# Patient Record
Sex: MALE | Race: WHITE | Employment: FULL TIME | URBAN - METROPOLITAN AREA
[De-identification: names, ages, dates, MRNs, and addresses within clinical notes are randomized per-mention and may not be internally consistent; named-entity substitution may affect disease eponyms.]

---

## 2017-03-27 DIAGNOSIS — R93.1 ELEVATED CORONARY ARTERY CALCIUM SCORE: ICD-10-CM

## 2017-03-27 DIAGNOSIS — E78.2 MIXED HYPERLIPIDEMIA: ICD-10-CM

## 2017-03-27 RX ORDER — ATORVASTATIN CALCIUM 20 MG/1
TABLET, FILM COATED ORAL
Qty: 90 TAB | Refills: 3 | Status: SHIPPED | COMMUNITY
Start: 2017-03-27 | End: 2017-11-06 | Stop reason: SDUPTHER

## 2017-10-23 ENCOUNTER — TELEPHONE (OUTPATIENT)
Dept: INTERNAL MEDICINE CLINIC | Age: 67
End: 2017-10-23

## 2017-10-23 DIAGNOSIS — E78.2 MIXED HYPERLIPIDEMIA: Primary | ICD-10-CM

## 2017-10-23 DIAGNOSIS — Z79.899 ENCOUNTER FOR LONG-TERM CURRENT USE OF MEDICATION: ICD-10-CM

## 2017-10-27 LAB
ALBUMIN SERPL-MCNC: 4.4 G/DL (ref 3.6–4.8)
ALBUMIN/GLOB SERPL: 1.8 {RATIO} (ref 1.2–2.2)
ALP SERPL-CCNC: 57 IU/L (ref 39–117)
ALT SERPL-CCNC: 21 IU/L (ref 0–44)
APPEARANCE UR: CLEAR
AST SERPL-CCNC: 24 IU/L (ref 0–40)
BASOPHILS # BLD AUTO: 0 X10E3/UL (ref 0–0.2)
BASOPHILS NFR BLD AUTO: 0 %
BILIRUB SERPL-MCNC: 0.6 MG/DL (ref 0–1.2)
BILIRUB UR QL STRIP: NEGATIVE
BUN SERPL-MCNC: 22 MG/DL (ref 8–27)
BUN/CREAT SERPL: 21 (ref 10–24)
CALCIUM SERPL-MCNC: 9.2 MG/DL (ref 8.6–10.2)
CHLORIDE SERPL-SCNC: 100 MMOL/L (ref 96–106)
CHOLEST SERPL-MCNC: 170 MG/DL (ref 100–199)
CO2 SERPL-SCNC: 28 MMOL/L (ref 18–29)
COLOR UR: YELLOW
CREAT SERPL-MCNC: 1.07 MG/DL (ref 0.76–1.27)
EOSINOPHIL # BLD AUTO: 0.1 X10E3/UL (ref 0–0.4)
EOSINOPHIL NFR BLD AUTO: 3 %
ERYTHROCYTE [DISTWIDTH] IN BLOOD BY AUTOMATED COUNT: 13.4 % (ref 12.3–15.4)
GFR SERPLBLD CREATININE-BSD FMLA CKD-EPI: 71 ML/MIN/1.73
GFR SERPLBLD CREATININE-BSD FMLA CKD-EPI: 83 ML/MIN/1.73
GLOBULIN SER CALC-MCNC: 2.4 G/DL (ref 1.5–4.5)
GLUCOSE SERPL-MCNC: 91 MG/DL (ref 65–99)
GLUCOSE UR QL: NEGATIVE
HCT VFR BLD AUTO: 44.3 % (ref 37.5–51)
HDLC SERPL-MCNC: 67 MG/DL
HGB BLD-MCNC: 14.2 G/DL (ref 12.6–17.7)
HGB UR QL STRIP: NEGATIVE
IMM GRANULOCYTES # BLD: 0 X10E3/UL (ref 0–0.1)
IMM GRANULOCYTES NFR BLD: 1 %
KETONES UR QL STRIP: NEGATIVE
LDLC SERPL CALC-MCNC: 90 MG/DL (ref 0–99)
LEUKOCYTE ESTERASE UR QL STRIP: NEGATIVE
LYMPHOCYTES # BLD AUTO: 1.6 X10E3/UL (ref 0.7–3.1)
LYMPHOCYTES NFR BLD AUTO: 32 %
MCH RBC QN AUTO: 30.1 PG (ref 26.6–33)
MCHC RBC AUTO-ENTMCNC: 32.1 G/DL (ref 31.5–35.7)
MCV RBC AUTO: 94 FL (ref 79–97)
MICRO URNS: NORMAL
MONOCYTES # BLD AUTO: 0.5 X10E3/UL (ref 0.1–0.9)
MONOCYTES NFR BLD AUTO: 9 %
NEUTROPHILS # BLD AUTO: 2.7 X10E3/UL (ref 1.4–7)
NEUTROPHILS NFR BLD AUTO: 55 %
NITRITE UR QL STRIP: NEGATIVE
PH UR STRIP: 7 [PH] (ref 5–7.5)
PLATELET # BLD AUTO: 230 X10E3/UL (ref 150–379)
POTASSIUM SERPL-SCNC: 4.5 MMOL/L (ref 3.5–5.2)
PROT SERPL-MCNC: 6.8 G/DL (ref 6–8.5)
PROT UR QL STRIP: NEGATIVE
RBC # BLD AUTO: 4.71 X10E6/UL (ref 4.14–5.8)
SODIUM SERPL-SCNC: 142 MMOL/L (ref 134–144)
SP GR UR: 1.03 (ref 1–1.03)
TRIGL SERPL-MCNC: 67 MG/DL (ref 0–149)
UROBILINOGEN UR STRIP-MCNC: 1 MG/DL (ref 0.2–1)
VLDLC SERPL CALC-MCNC: 13 MG/DL (ref 5–40)
WBC # BLD AUTO: 4.9 X10E3/UL (ref 3.4–10.8)

## 2017-11-06 ENCOUNTER — OFFICE VISIT (OUTPATIENT)
Dept: INTERNAL MEDICINE CLINIC | Age: 67
End: 2017-11-06

## 2017-11-06 VITALS
WEIGHT: 185.6 LBS | HEART RATE: 80 BPM | SYSTOLIC BLOOD PRESSURE: 118 MMHG | BODY MASS INDEX: 23.08 KG/M2 | OXYGEN SATURATION: 98 % | DIASTOLIC BLOOD PRESSURE: 72 MMHG | RESPIRATION RATE: 18 BRPM | TEMPERATURE: 98.1 F | HEIGHT: 75 IN

## 2017-11-06 DIAGNOSIS — E78.2 MIXED HYPERLIPIDEMIA: ICD-10-CM

## 2017-11-06 DIAGNOSIS — K62.5 RECTAL BLEEDING: ICD-10-CM

## 2017-11-06 DIAGNOSIS — E78.2 MIXED HYPERLIPIDEMIA: Primary | ICD-10-CM

## 2017-11-06 DIAGNOSIS — R93.1 ELEVATED CORONARY ARTERY CALCIUM SCORE: ICD-10-CM

## 2017-11-06 DIAGNOSIS — E55.9 VITAMIN D DEFICIENCY: ICD-10-CM

## 2017-11-06 DIAGNOSIS — Z12.5 PROSTATE CANCER SCREENING: ICD-10-CM

## 2017-11-06 RX ORDER — ATORVASTATIN CALCIUM 20 MG/1
TABLET, FILM COATED ORAL
Qty: 90 TAB | Refills: 3 | Status: SHIPPED | OUTPATIENT
Start: 2017-11-06 | End: 2018-10-15 | Stop reason: SDUPTHER

## 2017-11-06 RX ORDER — GUAIFENESIN 100 MG/5ML
81 LIQUID (ML) ORAL
Qty: 30 TAB | Refills: 11
Start: 2017-11-06 | End: 2018-11-12

## 2017-11-06 RX ORDER — ACETAMINOPHEN 500 MG
2000 TABLET ORAL 2 TIMES DAILY
Qty: 30 CAP | Refills: 11
Start: 2017-11-06 | End: 2018-11-12 | Stop reason: SDDI

## 2017-11-06 NOTE — PROGRESS NOTES
HISTORY OF PRESENT ILLNESS  Warden Santana is a 79 y.o. male. HPI Maycolneisha Oneal is seen today for followup of hyperlipidemia <dictation skips>. Hyperlipidemia. Reviewed labs. Stable on current regimen. Review of Systems is notable for occasional hemorrhoidal bleeding. I have encouraged him to follow up with GI or a colorectal specialist.  He will consider this and let me know his preference. He has some left hip pain. He denies excessive NSAIDs. Regarding Preventive Medicine, Medicare Wellness visit in three months. Past Medical History <dictation abruptly ends>                        Review of Systems   Constitutional: Negative for weight loss. Respiratory: Negative. Cardiovascular: Negative for chest pain, palpitations, leg swelling and PND. Gastrointestinal: Positive for blood in stool. Musculoskeletal: Negative for myalgias. Neurological: Negative for focal weakness. Physical Exam   Constitutional: No distress. Neck: Carotid bruit is not present. Cardiovascular: Normal rate and regular rhythm. Exam reveals no gallop and no friction rub. No murmur heard. Pulmonary/Chest: Effort normal and breath sounds normal. No respiratory distress. Musculoskeletal: He exhibits no edema. Nursing note and vitals reviewed. ASSESSMENT and PLAN  Diagnoses and all orders for this visit:    1. Mixed hyperlipidemia  -     LIPID PANEL  -     HEPATIC FUNCTION PANEL  -     aspirin 81 mg chewable tablet; Take 1 Tab by mouth every fourty-eight (48) hours. 2. Rectal bleeding- see hpi, See gastroenterologist as directed     3. Elevated coronary artery calcium score  -     aspirin 81 mg chewable tablet; Take 1 Tab by mouth every fourty-eight (48) hours. 4. Prostate cancer screening  -     PSA SCREENING (SCREENING) ()    5. Vitamin D deficiency  -     VITAMIN D, 25 HYDROXY  -     Cholecalciferol, Vitamin D3, (VITAMIN D3) 2,000 unit cap capsule;  Take 2,000 Units by mouth two (2) times a day.

## 2017-11-06 NOTE — MR AVS SNAPSHOT
Visit Information Date & Time Provider Department Dept. Phone Encounter #  
 11/6/2017  1:20 PM Humaira Peoples, [address] [place] [address] Internal Medicine [phone] [id number] Follow-up Instructions Return in about 3 months (around 2/6/2018) for mwv. Upcoming Health Maintenance Date Due  
 MEDICARE YEARLY EXAM 12/2/2016 Pneumococcal 65+ Low/Medium Risk (2 of 2 - PPSV23) 12/30/2017 GLAUCOMA SCREENING Q2Y 10/14/2018 COLONOSCOPY 11/19/2022 DTaP/Tdap/Td series (3 - Td) 9/17/2024 Allergies as of 11/6/2017  Review Complete On: 11/6/2017 By: Humaira Peoples MD  
  
 Severity Noted Reaction Type Reactions Adhesive  11/02/2011    Rash  
 ecg adhesive Hay Fever And Allergy Relief  07/05/2011    Unknown (comments) Current Immunizations  Reviewed on 1/4/2017 Name Date Influenza High Dose Vaccine PF 12/2/2015 Influenza Vaccine (Quad) PF 9/25/2014 Influenza Vaccine Split 11/8/2012, 11/7/2011 Pneumococcal Conjugate (PCV-13) 12/30/2016 TDAP Vaccine 11/7/2011 Tdap 9/17/2014  7:38 PM  
 Zoster 11/15/2011 Not reviewed this visit You Were Diagnosed With   
  
 Codes Comments Mixed hyperlipidemia    -  Primary ICD-10-CM: F00.3 ICD-9-CM: 272.2 Rectal bleeding     ICD-10-CM: K62.5 ICD-9-CM: 569.3 Elevated coronary artery calcium score     ICD-10-CM: R93.1 ICD-9-CM: 414.00 Prostate cancer screening     ICD-10-CM: Z12.5 ICD-9-CM: V76.44 Vitamin D deficiency     ICD-10-CM: E55.9 ICD-9-CM: 268.9 Vitals BP Pulse Temp Resp Height(growth percentile) Weight(growth percentile) 118/72 80 98.1 °F (36.7 °C) 18 6' 3\" (1.905 m) 185 lb 9.6 oz (84.2 kg) SpO2 BMI Smoking Status 98% 23.2 kg/m2 Never Smoker BMI and BSA Data Body Mass Index Body Surface Area  
 23.2 kg/m 2 2.11 m 2 Preferred Pharmacy Pharmacy Name Phone  [facility], [address] [address] [place] [address] [address] [facility] Your Updated Medication List  
  
   
This list is accurate as of: 11/6/17  2:10 PM.  Always use your most recent med list.  
  
  
  
  
 aspirin 81 mg chewable tablet Take 1 Tab by mouth every fourty-eight (48) hours. atorvastatin 20 mg tablet Commonly known as:  LIPITOR  
TAKE ONE TABLET BY MOUTH DAILY Cholecalciferol (Vitamin D3) 2,000 unit Cap capsule Commonly known as:  VITAMIN D3 Take 2,000 Units by mouth two (2) times a day. We Performed the Following HEPATIC FUNCTION PANEL [50520 CPT(R)] LIPID PANEL [33098 CPT(R)] PSA SCREENING (SCREENING) [ [facility]] VITAMIN D, 25 HYDROXY [id number] CPT(R)] Follow-up Instructions Return in about 3 months (around 2/6/2018) for mwv. Introducing hospitals & HEALTH SERVICES! Dear Musa Westfall: 
Thank you for requesting a [employer] account. Our records indicate that you already have an active [employer] account. You can access your account anytime at [email or web address] Did you know that you can access your hospital and ER discharge instructions at any time in [employer]? You can also review all of your test results from your hospital stay or ER visit. Additional Information If you have questions, please visit the Frequently Asked Questions section of the [employer] website at [email or web address] Remember, [employer] is NOT to be used for urgent needs. For medical emergencies, dial 911. Now available from your iPhone and Android! Please provide this summary of care documentation to your next provider. Your primary care clinician is listed as AUBREY MORELOS. If you have any questions after today's visit, please call [phone].

## 2018-10-15 DIAGNOSIS — R93.1 ELEVATED CORONARY ARTERY CALCIUM SCORE: ICD-10-CM

## 2018-10-15 DIAGNOSIS — E78.2 MIXED HYPERLIPIDEMIA: ICD-10-CM

## 2018-10-15 NOTE — TELEPHONE ENCOUNTER
Last visit 11/ 6/17, was asked to return 2/2018 for a physical. Has an appointment scheduled with Dr. Jojo Wells on 11/12/18.  Pended script for MD approval.

## 2018-10-17 RX ORDER — ATORVASTATIN CALCIUM 20 MG/1
TABLET, FILM COATED ORAL
Qty: 90 TAB | Refills: 1 | Status: SHIPPED | OUTPATIENT
Start: 2018-10-17 | End: 2019-04-25 | Stop reason: SDUPTHER

## 2018-11-12 ENCOUNTER — OFFICE VISIT (OUTPATIENT)
Dept: INTERNAL MEDICINE CLINIC | Age: 68
End: 2018-11-12

## 2018-11-12 VITALS
WEIGHT: 177 LBS | BODY MASS INDEX: 22.01 KG/M2 | RESPIRATION RATE: 16 BRPM | SYSTOLIC BLOOD PRESSURE: 136 MMHG | TEMPERATURE: 98.3 F | HEART RATE: 89 BPM | DIASTOLIC BLOOD PRESSURE: 73 MMHG | HEIGHT: 75 IN | OXYGEN SATURATION: 97 %

## 2018-11-12 DIAGNOSIS — K62.5 RECTAL BLEEDING: ICD-10-CM

## 2018-11-12 DIAGNOSIS — E78.2 MIXED HYPERLIPIDEMIA: ICD-10-CM

## 2018-11-12 DIAGNOSIS — Z23 ENCOUNTER FOR IMMUNIZATION: ICD-10-CM

## 2018-11-12 DIAGNOSIS — Z00.00 WELCOME TO MEDICARE PREVENTIVE VISIT: Primary | ICD-10-CM

## 2018-11-12 DIAGNOSIS — R93.1 ELEVATED CORONARY ARTERY CALCIUM SCORE: ICD-10-CM

## 2018-11-12 DIAGNOSIS — Z12.5 PROSTATE CANCER SCREENING: ICD-10-CM

## 2018-11-12 DIAGNOSIS — G56.01 CARPAL TUNNEL SYNDROME ON RIGHT: ICD-10-CM

## 2018-11-12 DIAGNOSIS — Z13.31 SCREENING FOR DEPRESSION: ICD-10-CM

## 2018-11-12 NOTE — PROGRESS NOTES
HISTORY OF PRESENT ILLNESS Kelly Acosta is a 76 y.o. male. ELYSE Chavez is seen today for a Welcome to Medicare visit and follow up of chronic problems. 1. Preventive medicine. He is due for lab studies, shingles vaccine, Pneumovax, EKG. He is up to date with other vaccinations and a colonoscopy. 2. Medicare Wellness Visit. See attached note. 3. Chronic medical problems are reviewed. 4. Hyperlipidemia. Due for routine labs. He denies medication side effects. 5. Hemorrhoids. These are bothering him more and he would like to see a colorectal specialist. He has occasional bright red blood on toilet paper. 6. Hip pain. Reviewed stretching and ortho consultation if progressive. Review of systems notable for nocturia x three. He also notes right carpal tunnel syndrome symptoms for the last six months. I printed some information for this, but strongly recommended seeing a hand ortho specialist if persistent. Current Outpatient Medications Medication Sig  
 atorvastatin (LIPITOR) 20 mg tablet TAKE ONE TABLET BY MOUTH DAILY No current facility-administered medications for this visit. Review of Systems Constitutional: Negative for weight loss. HENT: Positive for hearing loss. Respiratory: Negative. Cardiovascular: Positive for palpitations. Negative for chest pain, leg swelling and PND. Rare Gastrointestinal: Positive for blood in stool. Genitourinary: Positive for frequency. Musculoskeletal: Negative for myalgias. Neurological: Positive for tingling and sensory change. Negative for focal weakness. Physical Exam  
Constitutional: He is oriented to person, place, and time. He appears well-developed and well-nourished. No distress. HENT:  
Head: Normocephalic and atraumatic.   
Right Ear: Tympanic membrane, external ear and ear canal normal.  
Left Ear: Tympanic membrane, external ear and ear canal normal.  
 Eyes: EOM are normal. Pupils are equal, round, and reactive to light. Right eye exhibits no discharge. Left eye exhibits no discharge. Neck: Normal range of motion. Neck supple. Carotid bruit is not present. No thyromegaly present. Cardiovascular: Normal rate, regular rhythm, normal heart sounds and intact distal pulses. Exam reveals no gallop and no friction rub. No murmur heard. Pulmonary/Chest: Effort normal and breath sounds normal. No respiratory distress. He has no wheezes. He has no rales. Abdominal: Soft. Bowel sounds are normal. He exhibits no distension and no mass. There is no tenderness. There is no rebound and no guarding. Genitourinary: Rectum normal and prostate normal.  
Musculoskeletal: Normal range of motion. He exhibits no edema or tenderness. Lymphadenopathy:  
  He has no cervical adenopathy. Neurological: He is alert and oriented to person, place, and time. He has normal reflexes. Reflex Scores: 
     Bicep reflexes are 2+ on the right side and 2+ on the left side. Patellar reflexes are 2+ on the right side and 2+ on the left side. Skin: Skin is warm and dry. No rash noted. Psychiatric: He has a normal mood and affect. His behavior is normal.  
Nursing note and vitals reviewed. ASSESSMENT and PLAN Diagnoses and all orders for this visit: 
 
1. Welcome to Medicare preventive visit -     AMB POC EKG ROUTINE W/ 12 LEADS, INTER & REP 2. Rectal bleeding 
-     CBC WITH AUTOMATED DIFF 
-     REFERRAL TO COLON AND RECTAL SURGERY 3. Elevated coronary artery calcium score- Continue current regimen of prescription and / or OTC medications 4. Mixed hyperlipidemia 
-     TSH 3RD GENERATION 
-     METABOLIC PANEL, COMPREHENSIVE 
-     UA/M W/RFLX CULTURE, ROUTINE 
-     LIPID PANEL 5. Prostate cancer screening -     PSA SCREENING (SCREENING) 6. Screening for depression 7. Encounter for immunization -     PNEUMOCOCCAL POLYSACCHARIDE VACCINE, 23-VALENT, ADULT OR IMMUNOSUPPRESSED PT DOSE, 
-     [place] IMMUNIZ ADMIN,1 SINGLE/COMB VAC/TOXOID 8. Carpal tunnel syndrome on right- See patient instructions Other orders 
-     varicella-zoster recombinant, PF, (SHINGRIX) 50 mcg/0.5 mL susr injection; 0.5 mL by IntraMUSCular route once for 1 dose. Repeat in 2-6 months -     MICROSCOPIC EXAMINATION

## 2018-11-12 NOTE — PATIENT INSTRUCTIONS
Medicare Wellness Visit, Male The best way to live healthy is to have a lifestyle where you eat a well-balanced diet, exercise regularly, limit alcohol use, and quit all forms of tobacco/nicotine, if applicable. Regular preventive services are another way to keep healthy. Preventive services (vaccines, screening tests, monitoring & exams) can help personalize your care plan, which helps you manage your own care. Screening tests can find health problems at the earliest stages, when they are easiest to treat. [facility] follows the current, evidence-based guidelines published by the [facility] Hilario Kendra ([facility]) when recommending preventive services for our patients. Because we follow these guidelines, sometimes recommendations change over time as research supports it. (For example, a prostate screening blood test is no longer routinely recommended for men with no symptoms.) Of course, you and your doctor may decide to screen more often for some diseases, based on your risk and co-morbidities (chronic disease you are already diagnosed with). Preventive services for you include: - Medicare offers their members a free annual wellness visit, which is time for you and your primary care provider to discuss and plan for your preventive service needs. Take advantage of this benefit every year! 
-All adults over age 72 should receive the recommended pneumonia vaccines. Current USPSTF guidelines recommend a series of two vaccines for the best pneumonia protection.  
-All adults should have a flu vaccine yearly and an ECG.  All adults age 61 and older should receive a shingles vaccine once in their lifetime.   
-All adults age 38-68 who are overweight should have a diabetes screening test once every three years.  
-Other screening tests & preventive services for persons with diabetes include: an eye exam to screen for diabetic retinopathy, a kidney function test, a foot exam, and stricter control over your cholesterol.  
-Cardiovascular screening for adults with routine risk involves an electrocardiogram (ECG) at intervals determined by the provider.  
-Colorectal cancer screening should be done for adults age 54-65 with no increased risk factors for colorectal cancer. There are a number of acceptable methods of screening for this type of cancer. Each test has its own benefits and drawbacks. Discuss with your provider what is most appropriate for you during your annual wellness visit. The different tests include: colonoscopy (considered the best screening method), a fecal occult blood test, a fecal DNA test, and sigmoidoscopy. 
-All adults born between [facility] should be screened once for Hepatitis C. 
-An Abdominal Aortic Aneurysm (AAA) Screening is recommended for men age 73-68 who has ever smoked in their lifetime. Here is a list of your current Health Maintenance items (your personalized list of preventive services) with a due date: There are no preventive care reminders to display for this patient. Carpal Tunnel Syndrome: Care Instructions Your Care Instructions Carpal tunnel syndrome is a nerve problem. It can cause tingling, numbness, weakness, or pain in the fingers, thumb, and hand. The median nerve and several tough tissues called tendons run through a space in the wrist called the carpal tunnel. The repeated hand motions used in work and some hobbies and sports can put pressure on the nerve. Pregnancy and several conditions, including diabetes, arthritis, and an underactive thyroid, also can cause carpal tunnel syndrome. You may be able to limit an activity or do it differently to reduce your symptoms. You also can take other steps to feel better. If your symptoms are mild, 1 to 2 weeks of home treatment are likely to ease your pain. Surgery is needed only if other treatments do not work. Follow-up care is a key part of your treatment and safety. Be sure to make and go to all appointments, and call your doctor if you are having problems. It's also a good idea to know your test results and keep a list of the medicines you take. How can you care for yourself at home? · If possible, stop or reduce the activity that causes your symptoms. If you cannot stop the activity, take frequent breaks to rest and stretch or change hand positions to do a task. Try switching hands, such as when using a computer mouse. · Try to avoid bending or twisting your wrists. · Ask your doctor if you can take an over-the-counter pain medicine, such as acetaminophen (Tylenol), ibuprofen (Advil, Motrin), or naproxen (Aleve). Be safe with medicines. Read and follow all instructions on the label. · If your doctor prescribes corticosteroid medicine to help reduce pain and swelling, take it exactly as prescribed. Call your doctor if you think you are having a problem with your medicine. · Put ice or a cold pack on your wrist for 10 to 20 minutes at a time to ease pain. Put a thin cloth between the ice and your skin. · If your doctor or your physical or occupational therapist tells you to wear a wrist splint, wear it as directed to keep your wrist in a neutral position. This also eases pressure on your median nerve. · Ask your doctor whether you should have physical or occupational therapy to learn how to do tasks differently. · Try a yoga class to stretch your muscles and build strength in your hands and wrists. Yoga has been shown to ease carpal tunnel symptoms. To prevent carpal tunnel · When working at a computer, keep your hands and wrists in line with your forearms. Hold your elbows close to your sides. Take a break every 10 to 15 minutes. · Try these exercises: 
? Warm up: Rotate your wrist up, down, and from side to side. Repeat this 4 times.  Stretch your fingers far apart, relax them, then stretch them again. Repeat 4 times. Stretch your thumb by pulling it back gently, holding it, and then releasing it. Repeat 4 times. ? Prayer stretch: Start with your palms together in front of your chest just below your chin. Slowly lower your hands toward your waistline while keeping your hands close to your stomach and your palms together until you feel a mild to moderate stretch under your forearms. Hold for 10 to 20 seconds. Repeat 4 times. ? Wrist flexor stretch: Hold your arm in front of you with your palm up. Bend your wrist, pointing your hand toward the floor. With your other hand, gently bend your wrist further until you feel a mild to moderate stretch in your forearm. Hold for 10 to 20 seconds. Repeat 4 times. ? Wrist extensor stretch: Repeat the steps for the wrist flexor stretch, but begin with your extended hand palm down. · Squeeze a rubber exercise ball several times a day to keep your hands and fingers strong. · Avoid holding objects (such as a book) in one position for a long time. When possible, use your whole hand to grasp an object. Using just the thumb and index finger can put stress on the wrist. 
· Do not smoke. It can make this condition worse by reducing blood flow to the median nerve. If you need help quitting, talk to your doctor about stop-smoking programs and medicines. These can increase your chances of quitting for good. When should you call for help? Watch closely for changes in your health, and be sure to contact your doctor if: 
  · Your pain or other problems do not get better with home care.  
  · You want more information about physical or occupational therapy.  
  · You have side effects of your corticosteroid medicine, such as: 
? Weight gain. ? Mood changes. ? Trouble sleeping. ? Bruising easily.  
  · You have any other problems with your medicine. Where can you learn more? Go to http://jonh-kobi.info/. Enter R432 in the search box to learn more about \"Carpal Tunnel Syndrome: Care Instructions. \" Current as of: November 29, 2017 Content Version: 11.8 © 5436-0413 [employer]. Care instructions adapted under license by [employer] (which disclaims liability or warranty for this information). If you have questions about a medical condition or this instruction, always ask your healthcare professional. [address] any warranty or liability for your use of this information. Carpal Tunnel Syndrome: Exercises Your Care Instructions Here are some examples of typical rehabilitation exercises for your condition. Start each exercise slowly. Ease off the exercise if you start to have pain. Your doctor or your physical or occupational therapist will tell you when you can start these exercises and which ones will work best for you. Warm-up stretches When you no longer have pain or numbness, you can do exercises to help prevent carpal tunnel syndrome from coming back. Do not do any stretch or movement that is uncomfortable or painful. 1. Rotate your wrist up, down, and from side to side. Repeat 4 times. 2. Stretch your fingers far apart. Relax them, and then stretch them again. Repeat 4 times. 3. Stretch your thumb by pulling it back gently, holding it, and then releasing it. Repeat 4 times. How to do the exercises Prayer stretch 1. Start with your palms together in front of your chest just below your chin. 2. Slowly lower your hands toward your waistline, keeping your hands close to your stomach and your palms together until you feel a mild to moderate stretch under your forearms. 3. Hold for at least 15 to 30 seconds. Repeat 2 to 4 times. Wrist flexor stretch 1. Extend your arm in front of you with your palm up. 2. Bend your wrist, pointing your hand toward the floor.  
3. With your other hand, gently bend your wrist farther until you feel a mild to moderate stretch in your forearm. 4. Hold for at least 15 to 30 seconds. Repeat 2 to 4 times. Wrist extensor stretch 1. Repeat steps 1 through 4 of the stretch above, but begin with your extended hand palm down. Follow-up care is a key part of your treatment and safety. Be sure to make and go to all appointments, and call your doctor if you are having problems. It's also a good idea to know your test results and keep a list of the medicines you take. Where can you learn more? Go to http://jonh-kobi.info/. Enter Z756 in the search box to learn more about \"Carpal Tunnel Syndrome: Exercises. \" Current as of: November 29, 2017 Content Version: 11.8 © 5622-1115 [employer]. Care instructions adapted under license by [employer] (which disclaims liability or warranty for this information). If you have questions about a medical condition or this instruction, always ask your healthcare professional. [place] [address] any warranty or liability for your use of this information.

## 2018-11-12 NOTE — PROGRESS NOTES
This is a \"Welcome to [facility]"  Initial Preventive Physical Examination (IPPE) providing Personalized Prevention Plan Services (Performed in the first 12 months of enrollment) I have reviewed the patient's medical history in detail and updated the computerized patient record. History Past Medical History:  
Diagnosis Date  Allergic rhinitis, cause unspecified  Arrhythmia   
 heart murmur  Hemorrhoids  Mitral valve prolapse Past Surgical History:  
Procedure Laterality Date [address] [place] [address] UNLISTED    
 [facility]  ENDOSCOPY, COLON, DIAGNOSTIC    
 2000, 2012 , due 22  
 HX CATARACT REMOVAL    
 HX HEENT    
 wisdom teeth  HX OTHER SURGICAL    
 wisdom teeth  HX RETINAL DETACHMENT REPAIR Current Outpatient Medications Medication Sig Dispense Refill  atorvastatin (LIPITOR) 20 mg tablet TAKE ONE TABLET BY MOUTH DAILY 90 Tab 1 Allergies Allergen Reactions  Adhesive Rash  
  ecg adhesive  Hay Fever And Allergy Relief Unknown (comments) Family History Problem Relation Age of Onset  Hypertension Father  Diabetes Father  Stroke Maternal Aunt   
     cerebral aneurysm  Psychiatric Disorder Paternal Uncle   
     alzheimers  Cancer Paternal Uncle  Cancer Other 70  
     prostate Social History Tobacco Use  Smoking status: Never Smoker  Smokeless tobacco: Never Used Substance Use Topics  Alcohol use: Yes Comment: occasionally Diet, Lifestyle: No special diet Exercise level: moderately active Depression Risk Screen PHQ over the last two weeks 12/13/2016 Little interest or pleasure in doing things Not at all Feeling down, depressed, irritable, or hopeless Not at all Total Score PHQ 2 0 Alcohol Risk Screen 7 per wk Functional Ability and Level of Safety Hearing Loss The patient feels he needs hearing aids. Vision Screening Vision is good. No exam data present Activities of Daily Living The home contains: no safety equipment. Patient does total self care Fall Risk Screen Fall Risk Assessment, last 12 mths 12/13/2016 Able to walk? Yes Fall in past 12 months? No  
 
 
Abuse Screen Patient is not abused Screening EKG EKG order placed: Yes Patient Care Team  
Patient Care Team: 
Pham Alvarez MD as PCP - General (Internal Medicine) Pham Alvarez MD (Internal Medicine) End of Life Planning Advanced care planning directives were not discussed with the patient and/or family/caregiver. Assessment/Plan Education and counseling provided: 
Are appropriate based on today's review and evaluation Diagnoses and all orders for this visit: 
 
1. Welcome to Medicare preventive visit 2. Rectal bleeding 3. Elevated coronary artery calcium score 4. Mixed hyperlipidemia 5. Prostate cancer screening 6. Screening for depression 
-     Sandy Bal Other orders 
-     varicella-zoster recombinant, PF, (SHINGRIX) 50 mcg/0.5 mL susr injection; 0.5 mL by IntraMUSCular route once for 1 dose. Repeat in 2-6 months 
-     CBC WITH AUTOMATED DIFF 
-     TSH 3RD GENERATION 
-     METABOLIC PANEL, COMPREHENSIVE 
-     UA/M W/RFLX CULTURE, ROUTINE 
-     LIPID PANEL 
-     PSA SCREENING (SCREENING) There are no preventive care reminders to display for this patient.

## 2018-11-14 ENCOUNTER — HOSPITAL ENCOUNTER (OUTPATIENT)
Dept: LAB | Age: 68
Discharge: HOME | End: 2018-11-14
Payer: MEDICARE

## 2018-11-14 PROCEDURE — 84153 ASSAY OF PSA TOTAL: CPT

## 2018-11-14 PROCEDURE — 85025 COMPLETE CBC W/AUTO DIFF WBC: CPT

## 2018-11-14 PROCEDURE — 80053 COMPREHEN METABOLIC PANEL: CPT

## 2018-11-14 PROCEDURE — 80061 LIPID PANEL: CPT

## 2018-11-14 PROCEDURE — 36415 COLL VENOUS BLD VENIPUNCTURE: CPT

## 2018-11-14 PROCEDURE — 84443 ASSAY THYROID STIM HORMONE: CPT

## 2018-11-14 PROCEDURE — 81001 URINALYSIS AUTO W/SCOPE: CPT

## 2018-11-15 LAB
ALBUMIN SERPL-MCNC: 4.4 G/DL (ref 3.6–4.8)
ALBUMIN/GLOB SERPL: 1.8 {RATIO} (ref 1.2–2.2)
ALP SERPL-CCNC: 52 IU/L (ref 39–117)
ALT SERPL-CCNC: 16 IU/L (ref 0–44)
APPEARANCE UR: CLEAR
AST SERPL-CCNC: 19 IU/L (ref 0–40)
BACTERIA #/AREA URNS HPF: NORMAL /[HPF]
BASOPHILS # BLD AUTO: 0 X10E3/UL (ref 0–0.2)
BASOPHILS NFR BLD AUTO: 0 %
BILIRUB SERPL-MCNC: 0.8 MG/DL (ref 0–1.2)
BILIRUB UR QL STRIP: NEGATIVE
BUN SERPL-MCNC: 18 MG/DL (ref 8–27)
BUN/CREAT SERPL: 17 (ref 10–24)
CALCIUM SERPL-MCNC: 9.1 MG/DL (ref 8.6–10.2)
CASTS URNS QL MICRO: NORMAL /LPF
CHLORIDE SERPL-SCNC: 101 MMOL/L (ref 96–106)
CHOLEST SERPL-MCNC: 147 MG/DL (ref 100–199)
CO2 SERPL-SCNC: 25 MMOL/L (ref 20–29)
COLOR UR: YELLOW
CREAT SERPL-MCNC: 1.03 MG/DL (ref 0.76–1.27)
EOSINOPHIL # BLD AUTO: 0.1 X10E3/UL (ref 0–0.4)
EOSINOPHIL NFR BLD AUTO: 1 %
EPI CELLS #/AREA URNS HPF: NORMAL /HPF
ERYTHROCYTE [DISTWIDTH] IN BLOOD BY AUTOMATED COUNT: 13.3 % (ref 12.3–15.4)
GLOBULIN SER CALC-MCNC: 2.4 G/DL (ref 1.5–4.5)
GLUCOSE SERPL-MCNC: 92 MG/DL (ref 65–99)
GLUCOSE UR QL: NEGATIVE
HCT VFR BLD AUTO: 43.4 % (ref 37.5–51)
HDLC SERPL-MCNC: 73 MG/DL
HGB BLD-MCNC: 13.9 G/DL (ref 13–17.7)
HGB UR QL STRIP: NEGATIVE
IMM GRANULOCYTES # BLD: 0 X10E3/UL (ref 0–0.1)
IMM GRANULOCYTES NFR BLD: 0 %
KETONES UR QL STRIP: NEGATIVE
LDLC SERPL CALC-MCNC: 62 MG/DL (ref 0–99)
LEUKOCYTE ESTERASE UR QL STRIP: NEGATIVE
LYMPHOCYTES # BLD AUTO: 1.6 X10E3/UL (ref 0.7–3.1)
LYMPHOCYTES NFR BLD AUTO: 21 %
MCH RBC QN AUTO: 30.1 PG (ref 26.6–33)
MCHC RBC AUTO-ENTMCNC: 32 G/DL (ref 31.5–35.7)
MCV RBC AUTO: 94 FL (ref 79–97)
MICRO URNS: NORMAL
MICRO URNS: NORMAL
MONOCYTES # BLD AUTO: 0.7 X10E3/UL (ref 0.1–0.9)
MONOCYTES NFR BLD AUTO: 9 %
MUCOUS THREADS URNS QL MICRO: PRESENT
NEUTROPHILS # BLD AUTO: 5.3 X10E3/UL (ref 1.4–7)
NEUTROPHILS NFR BLD AUTO: 69 %
NITRITE UR QL STRIP: NEGATIVE
PH UR STRIP: 6.5 [PH] (ref 5–7.5)
PLATELET # BLD AUTO: 213 X10E3/UL (ref 150–379)
POTASSIUM SERPL-SCNC: 4.4 MMOL/L (ref 3.5–5.2)
PROT SERPL-MCNC: 6.8 G/DL (ref 6–8.5)
PROT UR QL STRIP: NEGATIVE
PSA SERPL-MCNC: 1.1 NG/ML (ref 0–4)
RBC # BLD AUTO: 4.62 X10E6/UL (ref 4.14–5.8)
RBC #/AREA URNS HPF: NORMAL /HPF
SODIUM SERPL-SCNC: 141 MMOL/L (ref 134–144)
SP GR UR: 1.02 (ref 1–1.03)
TRIGL SERPL-MCNC: 59 MG/DL (ref 0–149)
TSH SERPL DL<=0.005 MIU/L-ACNC: 1.76 UIU/ML (ref 0.45–4.5)
URINALYSIS REFLEX, 377202: NORMAL
UROBILINOGEN UR STRIP-MCNC: 0.2 MG/DL (ref 0.2–1)
VLDLC SERPL CALC-MCNC: 12 MG/DL (ref 5–40)
WBC # BLD AUTO: 7.6 X10E3/UL (ref 3.4–10.8)
WBC #/AREA URNS HPF: NORMAL /HPF

## 2019-04-25 DIAGNOSIS — R93.1 ELEVATED CORONARY ARTERY CALCIUM SCORE: ICD-10-CM

## 2019-04-25 DIAGNOSIS — E78.2 MIXED HYPERLIPIDEMIA: ICD-10-CM

## 2019-04-25 RX ORDER — ATORVASTATIN CALCIUM 20 MG/1
TABLET, FILM COATED ORAL
Qty: 90 TAB | Refills: 0 | Status: SHIPPED | OUTPATIENT
Start: 2019-04-25 | End: 2019-07-26 | Stop reason: SDUPTHER

## 2019-07-26 DIAGNOSIS — R93.1 ELEVATED CORONARY ARTERY CALCIUM SCORE: ICD-10-CM

## 2019-07-26 DIAGNOSIS — E78.2 MIXED HYPERLIPIDEMIA: ICD-10-CM

## 2019-07-27 RX ORDER — ATORVASTATIN CALCIUM 20 MG/1
TABLET, FILM COATED ORAL
Qty: 90 TAB | Refills: 0 | Status: SHIPPED | OUTPATIENT
Start: 2019-07-27 | End: 2019-10-31 | Stop reason: SDUPTHER

## 2019-10-31 DIAGNOSIS — R93.1 ELEVATED CORONARY ARTERY CALCIUM SCORE: ICD-10-CM

## 2019-10-31 DIAGNOSIS — E78.2 MIXED HYPERLIPIDEMIA: ICD-10-CM

## 2019-11-01 RX ORDER — ATORVASTATIN CALCIUM 20 MG/1
TABLET, FILM COATED ORAL
Qty: 30 TAB | Refills: 0 | Status: SHIPPED | OUTPATIENT
Start: 2019-11-01 | End: 2019-12-16 | Stop reason: SDUPTHER

## 2019-11-19 ENCOUNTER — HOSPITAL ENCOUNTER (OUTPATIENT)
Dept: LAB | Age: 69
Discharge: HOME | End: 2019-11-19
Payer: MEDICARE

## 2019-11-19 ENCOUNTER — OFFICE VISIT (OUTPATIENT)
Dept: INTERNAL MEDICINE CLINIC | Age: 69
End: 2019-11-19

## 2019-11-19 VITALS
OXYGEN SATURATION: 97 % | RESPIRATION RATE: 18 BRPM | BODY MASS INDEX: 22.88 KG/M2 | WEIGHT: 184 LBS | HEART RATE: 77 BPM | TEMPERATURE: 98.1 F | SYSTOLIC BLOOD PRESSURE: 138 MMHG | HEIGHT: 75 IN | DIASTOLIC BLOOD PRESSURE: 82 MMHG

## 2019-11-19 DIAGNOSIS — Z12.11 SCREENING FOR COLON CANCER: ICD-10-CM

## 2019-11-19 DIAGNOSIS — Z13.31 SCREENING FOR DEPRESSION: ICD-10-CM

## 2019-11-19 DIAGNOSIS — E78.2 MIXED HYPERLIPIDEMIA: ICD-10-CM

## 2019-11-19 DIAGNOSIS — Z00.00 ENCOUNTER FOR MEDICARE ANNUAL WELLNESS EXAM: ICD-10-CM

## 2019-11-19 DIAGNOSIS — R93.1 ELEVATED CORONARY ARTERY CALCIUM SCORE: ICD-10-CM

## 2019-11-19 DIAGNOSIS — Z12.5 SCREENING FOR PROSTATE CANCER: ICD-10-CM

## 2019-11-19 DIAGNOSIS — Z00.00 INITIAL MEDICARE ANNUAL WELLNESS VISIT: Primary | ICD-10-CM

## 2019-11-19 PROCEDURE — 80048 BASIC METABOLIC PNL TOTAL CA: CPT

## 2019-11-19 PROCEDURE — 36415 COLL VENOUS BLD VENIPUNCTURE: CPT

## 2019-11-19 PROCEDURE — 85025 COMPLETE CBC W/AUTO DIFF WBC: CPT

## 2019-11-19 PROCEDURE — 84153 ASSAY OF PSA TOTAL: CPT

## 2019-11-19 PROCEDURE — 80076 HEPATIC FUNCTION PANEL: CPT

## 2019-11-19 PROCEDURE — 80061 LIPID PANEL: CPT

## 2019-11-19 PROCEDURE — 81003 URINALYSIS AUTO W/O SCOPE: CPT

## 2019-11-19 PROCEDURE — 84443 ASSAY THYROID STIM HORMONE: CPT

## 2019-11-19 NOTE — PATIENT INSTRUCTIONS
Medicare Wellness Visit, Male The best way to live healthy is to have a lifestyle where you eat a well-balanced diet, exercise regularly, limit alcohol use, and quit all forms of tobacco/nicotine, if applicable. Regular preventive services are another way to keep healthy. Preventive services (vaccines, screening tests, monitoring & exams) can help personalize your care plan, which helps you manage your own care. Screening tests can find health problems at the earliest stages, when they are easiest to treat. [facility] follows the current, evidence-based guidelines published by the [facility] Hilario Kendra ([facility]) when recommending preventive services for our patients. Because we follow these guidelines, sometimes recommendations change over time as research supports it. (For example, a prostate screening blood test is no longer routinely recommended for men with no symptoms). Of course, you and your doctor may decide to screen more often for some diseases, based on your risk and co-morbidities (chronic disease you are already diagnosed with). Preventive services for you include: - Medicare offers their members a free annual wellness visit, which is time for you and your primary care provider to discuss and plan for your preventive service needs. Take advantage of this benefit every year! 
-All adults over age 72 should receive the recommended pneumonia vaccines. Current USPSTF guidelines recommend a series of two vaccines for the best pneumonia protection.  
-All adults should have a flu vaccine yearly and tetanus vaccine every 10 years. 
-All adults age 48 and older should receive the shingles vaccines (series of two vaccines).       
-All adults age 38-68 who are overweight should have a diabetes screening test once every three years.  
-Other screening tests & preventive services for persons with diabetes include: an eye exam to screen for diabetic retinopathy, a kidney function test, a foot exam, and stricter control over your cholesterol.  
-Cardiovascular screening for adults with routine risk involves an electrocardiogram (ECG) at intervals determined by the provider.  
-Colorectal cancer screening should be done for adults age 54-65 with no increased risk factors for colorectal cancer. There are a number of acceptable methods of screening for this type of cancer. Each test has its own benefits and drawbacks. Discuss with your provider what is most appropriate for you during your annual wellness visit. The different tests include: colonoscopy (considered the best screening method), a fecal occult blood test, a fecal DNA test, and sigmoidoscopy. 
-All adults born between [facility] should be screened once for Hepatitis C. 
-An Abdominal Aortic Aneurysm (AAA) Screening is recommended for men age 73-68 who has ever smoked in their lifetime. Here is a list of your current Health Maintenance items (your personalized list of preventive services) with a due date: 
Health Maintenance Due Topic Date Due  
 Annual Well Visit  11/13/2019 Medicare Wellness Visit, Male The best way to live healthy is to have a lifestyle where you eat a well-balanced diet, exercise regularly, limit alcohol use, and quit all forms of tobacco/nicotine, if applicable. Regular preventive services are another way to keep healthy. Preventive services (vaccines, screening tests, monitoring & exams) can help personalize your care plan, which helps you manage your own care. Screening tests can find health problems at the earliest stages, when they are easiest to treat. [facility] follows the current, evidence-based guidelines published by the [facility] Hilario Kendra (USPSTF) when recommending preventive services for our patients.  Because we follow these guidelines, sometimes recommendations change over time as research supports it. (For example, a prostate screening blood test is no longer routinely recommended for men with no symptoms). Of course, you and your doctor may decide to screen more often for some diseases, based on your risk and co-morbidities (chronic disease you are already diagnosed with). Preventive services for you include: - Medicare offers their members a free annual wellness visit, which is time for you and your primary care provider to discuss and plan for your preventive service needs. Take advantage of this benefit every year! 
-All adults over age 72 should receive the recommended pneumonia vaccines. Current USPSTF guidelines recommend a series of two vaccines for the best pneumonia protection.  
-All adults should have a flu vaccine yearly and tetanus vaccine every 10 years. 
-All adults age 48 and older should receive the shingles vaccines (series of two vaccines). -All adults age 38-68 who are overweight should have a diabetes screening test once every three years.  
-Other screening tests & preventive services for persons with diabetes include: an eye exam to screen for diabetic retinopathy, a kidney function test, a foot exam, and stricter control over your cholesterol.  
-Cardiovascular screening for adults with routine risk involves an electrocardiogram (ECG) at intervals determined by the provider.  
-Colorectal cancer screening should be done for adults age 54-65 with no increased risk factors for colorectal cancer. There are a number of acceptable methods of screening for this type of cancer. Each test has its own benefits and drawbacks. Discuss with your provider what is most appropriate for you during your annual wellness visit. The different tests include: colonoscopy (considered the best screening method), a fecal occult blood test, a fecal DNA test, and sigmoidoscopy. -All adults born between [birth year] and 1965 should be screened once for Hepatitis C. 
-An Abdominal Aortic Aneurysm (AAA) Screening is recommended for men age 73-68 who has ever smoked in their lifetime. Here is a list of your current Health Maintenance items (your personalized list of preventive services) with a due date: 
Health Maintenance Due Topic Date Due  
 Annual Well Visit  11/13/2019

## 2019-11-19 NOTE — PROGRESS NOTES
This is the Subsequent Medicare Annual Wellness Exam, performed 12 months or more after the Initial AWV or the last Subsequent AWV I have reviewed the patient's medical history in detail and updated the computerized patient record. History Patient Active Problem List  
Diagnosis Code  Inguinal hernia K40.90  
 Nocturia R35.1  Urinary hesitancy R39.11  
 Mixed hyperlipidemia E78.2  MVP (mitral valve prolapse) I34.1  Unspecified hemorrhoids without mention of complication E54.4  Allergic rhinitis, cause unspecified J30.9  Sprain of lumbar region S33. [place]  Vitamin D deficiency E55.9  Elevated coronary artery calcium score R93.1  Advance directive discussed with patient Z70.80 Past Medical History:  
Diagnosis Date  Allergic rhinitis, cause unspecified  Arrhythmia   
 heart murmur  Hemorrhoids  Mitral valve prolapse Past Surgical History:  
Procedure Laterality Date [address] [facility]-7/11  ENDOSCOPY, COLON, DIAGNOSTIC    
 2000, 2012 , due 22  
 HX CATARACT REMOVAL    
 HX HEENT    
 wisdom teeth  HX OTHER SURGICAL    
 wisdom teeth  HX RETINAL DETACHMENT REPAIR Current Outpatient Medications Medication Sig Dispense Refill  atorvastatin (LIPITOR) 20 mg tablet TAKE ONE TABLET BY MOUTH DAILY 30 Tab 0 Allergies Allergen Reactions  Adhesive Rash  
  ecg adhesive  Hay Fever And Allergy Relief Unknown (comments) Family History Problem Relation Age of Onset  Hypertension Father  Diabetes Father  Stroke Maternal Aunt   
     cerebral aneurysm  Psychiatric Disorder Paternal Uncle   
     alzheimers  Cancer Paternal Uncle  Cancer Other 70  
     prostate Social History Tobacco Use  Smoking status: Never Smoker  Smokeless tobacco: Never Used Substance Use Topics  Alcohol use: Yes Comment: occasionally Depression Risk Factor Screening: 3 most recent PHQ Screens 2019 Little interest or pleasure in doing things Not at all Feeling down, depressed, irritable, or hopeless Not at all Total Score PHQ 2 0 Alcohol Risk Factor Screening (MALE > 65): Do you average more 1 drink per night or more than 7 drinks a week: {Yes/No:1400::\"No\"} In the past three months have you have had more than 4 drinks containing alcohol on one occasion: {Yes/No:773635::\"No\"} Functional Ability and Level of Safety:  
Hearing: {Desc; hearing loss:63346::\"Hearing is good. \"} Activities of Daily Living: The home contains: {AWV Home FIBTJY:22663::\"VC safety equipment. \"} 
{Functional ADL's:01486::\"Patient does total self care\"} Ambulation: {Patient ambulates:81777::\"with no difficulty\"} Fall Risk: 
Fall Risk Assessment, last 12 mths 2019 Able to walk? Yes Fall in past 12 months? No  
 
 
Abuse Screen: 
{Abuse Screen:::\"Patient is not abused\"} Cognitive Screening Has your family/caregiver stated any concerns about your memory: {AWV no/yes:05056::\"no\"} {Cognitive Screenin} Patient Care Team  
Patient Care Team: 
Colene Olszewski, MD as PCP - General (Internal Medicine) Colene Olszewski, MD as PCP [facility] Colene Olszewski, MD (Internal Medicine) Assessment/Plan Education and counseling provided: 
{Education List, choose as appropriate:::\"Are appropriate based on today's review and evaluation\"} Diagnoses and all orders for this visit: 
 
1. Medicare annual wellness visit, subsequent 2. Encounter for Medicare annual wellness exam 
-     CBC WITH AUTOMATED DIFF 
-     METABOLIC PANEL, BASIC 
-     LIPID PANEL 
-     HEPATIC FUNCTION PANEL 
-     TSH 3RD GENERATION 
-     URINALYSIS W/ RFLX MICROSCOPIC 3. Screening for prostate cancer -     PSA SCREENING (SCREENING) 4. Screening for colon cancer 5. Screening for depression 
-     Sandy Bal Health Maintenance Due Topic Date Due  MEDICARE YEARLY EXAM  11/13/2019

## 2019-11-19 NOTE — PROGRESS NOTES
This is an Initial Medicare Annual Wellness Exam (AWV) (Performed 12 months after IPPE or effective date of Medicare Part B enrollment, Once in a lifetime)    I have reviewed the patient's medical history in detail and updated the computerized patient record. History     Patient Active Problem List   Diagnosis Code    Inguinal hernia K40.90    Nocturia R35.1    Urinary hesitancy R39.11    Mixed hyperlipidemia E78.2    MVP (mitral valve prolapse) I34.1    Unspecified hemorrhoids without mention of complication E19.6    Allergic rhinitis, cause unspecified J30.9    Sprain of lumbar region S33. 5XXA    Vitamin D deficiency E55.9    Elevated coronary artery calcium score R93.1    Advance directive discussed with patient Z70.80     Past Medical History:   Diagnosis Date    Allergic rhinitis, cause unspecified     Arrhythmia     heart murmur    Hemorrhoids     Mitral valve prolapse       Past Surgical History:   Procedure Laterality Date    ABDOMEN SURGERY PROC UNLISTED      [facility]-7/11    ENDOSCOPY, COLON, DIAGNOSTIC      2000, 2012 , due 25    HX CATARACT REMOVAL      HX HEENT      wisdom teeth    HX OTHER SURGICAL      wisdom teeth    HX RETINAL DETACHMENT REPAIR       Current Outpatient Medications   Medication Sig Dispense Refill    atorvastatin (LIPITOR) 20 mg tablet TAKE ONE TABLET BY MOUTH DAILY 30 Tab 0     Allergies   Allergen Reactions    Adhesive Rash     ecg adhesive    Hay Fever And Allergy Relief Unknown (comments)       Family History   Problem Relation Age of Onset    Hypertension Father     Diabetes Father     Stroke Maternal Aunt         cerebral aneurysm    Psychiatric Disorder Paternal Uncle         alzheimers    Cancer Paternal Uncle     Cancer Other 79        prostate     Social History     Tobacco Use    Smoking status: Never Smoker    Smokeless tobacco: Never Used   Substance Use Topics    Alcohol use: Yes     Comment: occasionally       Depression Risk Factor Screening:     3 most recent PHQ Screens 11/19/2019   Little interest or pleasure in doing things Not at all   Feeling down, depressed, irritable, or hopeless Not at all   Total Score PHQ 2 0       Alcohol Risk Factor Screening (MALE > 65): Do you average more 1 drink per night or more than 7 drinks a week: No    In the past three months have you have had more than 4 drinks containing alcohol on one occasion: No      Functional Ability and Level of Safety:   Hearing: Hearing is good. Activities of Daily Living: The home contains: no safety equipment. Patient does total self care     Ambulation: with no difficulty    Fall Risk:  Fall Risk Assessment, last 12 mths 11/19/2019   Able to walk? Yes   Fall in past 12 months? No       Abuse Screen:  Patient is not abused    Cognitive Screening   Has your family/caregiver stated any concerns about your memory: no      Patient Care Team   Patient Care Team:  Areli Darnell MD as PCP - General (Internal Medicine)  Areli Darnell MD as PCP [facility]  Areli Darnell MD (Internal Medicine)    Assessment/Plan   Education and counseling provided:  Are appropriate based on today's review and evaluation    Diagnoses and all orders for this visit:    1. Initial Medicare annual wellness visit    2. Encounter for Medicare annual wellness exam  -     CBC WITH AUTOMATED DIFF  -     METABOLIC PANEL, BASIC  -     LIPID PANEL  -     HEPATIC FUNCTION PANEL  -     TSH 3RD GENERATION  -     URINALYSIS W/ RFLX MICROSCOPIC    3. Screening for prostate cancer  -     PSA SCREENING (SCREENING)    4. Screening for colon cancer    5. Medicare annual wellness visit, subsequent    6. Screening for depression  -     DEPRESSION SCREEN ANNUAL    7. Elevated coronary artery calcium score    8.  Mixed hyperlipidemia         Health Maintenance Due   Topic Date Due    MEDICARE YEARLY EXAM  11/13/2019

## 2019-11-19 NOTE — PROGRESS NOTES
HISTORY OF PRESENT ILLNESS  Ky Hidalgo is a 71 y.o. male. HPI Subjective:  Connie Johnson is seen today for a Medicare wellness visit and follow up of chronic problems. 1. Wellness visit. See attached note. He is due for labs and the shingles vaccine. He is up to date with other vaccinations and a colonoscopy. Stool testing for occult blood is not indicated given fairly recent colonoscopy. 2. Chronic problems are reviewed. a. Hemorrhoids. He never had these assessed by surgery. Strongly encouraged this to rule out any other troubles. b. Hyperlipidemia. Due for routine labs. Review of Systems:  Notable for hip pain being about the same. Review of Systems   Constitutional: Negative for weight loss. Respiratory: Negative. Cardiovascular: Negative for chest pain, palpitations, leg swelling and PND. Gastrointestinal: Positive for blood in stool. Genitourinary: Negative. Musculoskeletal: Positive for joint pain. Negative for myalgias. Neurological: Negative for focal weakness. Physical Exam   Constitutional: He is oriented to person, place, and time. He appears well-developed and well-nourished. No distress. HENT:   Head: Normocephalic and atraumatic. Right Ear: Tympanic membrane, external ear and ear canal normal.   Left Ear: Tympanic membrane, external ear and ear canal normal.   Eyes: Pupils are equal, round, and reactive to light. EOM are normal. Right eye exhibits no discharge. Left eye exhibits no discharge. Neck: Normal range of motion. Neck supple. Carotid bruit is not present. No thyromegaly present. Cardiovascular: Normal rate, regular rhythm, normal heart sounds and intact distal pulses. Exam reveals no gallop and no friction rub. No murmur heard. Pulmonary/Chest: Effort normal and breath sounds normal. No respiratory distress. He has no wheezes. He has no rales. Abdominal: Soft. Bowel sounds are normal. He exhibits no distension and no mass.  There is no tenderness. There is no rebound and no guarding. Genitourinary: Rectal exam shows no mass and no tenderness. Prostate is enlarged. Prostate is not tender. Musculoskeletal: Normal range of motion. He exhibits no edema or tenderness. Lymphadenopathy:     He has no cervical adenopathy. Neurological: He is alert and oriented to person, place, and time. He has normal reflexes. Skin: Skin is warm and dry. No rash noted. Psychiatric: He has a normal mood and affect. His behavior is normal.   Nursing note and vitals reviewed. ASSESSMENT and PLAN  Diagnoses and all orders for this visit:    1. Initial Medicare annual wellness visit    2. Encounter for Medicare annual wellness exam    3. Screening for prostate cancer  -     PSA SCREENING (SCREENING)    4. Screening for colon cancer    5. Screening for depression  -     DEPRESSION SCREEN ANNUAL    6. Elevated coronary artery calcium score- Continue current regimen of prescription and / or OTC medications     7.  Mixed hyperlipidemia  -     CBC WITH AUTOMATED DIFF  -     METABOLIC PANEL, BASIC  -     LIPID PANEL  -     HEPATIC FUNCTION PANEL  -     TSH 3RD GENERATION  -     URINALYSIS W/ RFLX MICROSCOPIC

## 2019-11-20 LAB
ALBUMIN SERPL-MCNC: 4.3 G/DL (ref 3.6–4.8)
ALP SERPL-CCNC: 52 IU/L (ref 39–117)
ALT SERPL-CCNC: 19 IU/L (ref 0–44)
APPEARANCE UR: CLEAR
AST SERPL-CCNC: 19 IU/L (ref 0–40)
BASOPHILS # BLD AUTO: 0 X10E3/UL (ref 0–0.2)
BASOPHILS NFR BLD AUTO: 1 %
BILIRUB DIRECT SERPL-MCNC: 0.16 MG/DL (ref 0–0.4)
BILIRUB SERPL-MCNC: 0.4 MG/DL (ref 0–1.2)
BILIRUB UR QL STRIP: NEGATIVE
BUN SERPL-MCNC: 21 MG/DL (ref 8–27)
BUN/CREAT SERPL: 23 (ref 10–24)
CALCIUM SERPL-MCNC: 9.4 MG/DL (ref 8.6–10.2)
CHLORIDE SERPL-SCNC: 102 MMOL/L (ref 96–106)
CHOLEST SERPL-MCNC: 155 MG/DL (ref 100–199)
CO2 SERPL-SCNC: 21 MMOL/L (ref 20–29)
COLOR UR: YELLOW
CREAT SERPL-MCNC: 0.93 MG/DL (ref 0.76–1.27)
EOSINOPHIL # BLD AUTO: 0.1 X10E3/UL (ref 0–0.4)
EOSINOPHIL NFR BLD AUTO: 1 %
ERYTHROCYTE [DISTWIDTH] IN BLOOD BY AUTOMATED COUNT: 12.7 % (ref 12.3–15.4)
GLUCOSE SERPL-MCNC: 94 MG/DL (ref 65–99)
GLUCOSE UR QL: NEGATIVE
HCT VFR BLD AUTO: 42.1 % (ref 37.5–51)
HDLC SERPL-MCNC: 74 MG/DL
HGB BLD-MCNC: 14 G/DL (ref 13–17.7)
HGB UR QL STRIP: NEGATIVE
IMM GRANULOCYTES # BLD AUTO: 0 X10E3/UL (ref 0–0.1)
IMM GRANULOCYTES NFR BLD AUTO: 0 %
KETONES UR QL STRIP: NEGATIVE
LDLC SERPL CALC-MCNC: 68 MG/DL (ref 0–99)
LEUKOCYTE ESTERASE UR QL STRIP: NEGATIVE
LYMPHOCYTES # BLD AUTO: 1.4 X10E3/UL (ref 0.7–3.1)
LYMPHOCYTES NFR BLD AUTO: 25 %
MCH RBC QN AUTO: 31.7 PG (ref 26.6–33)
MCHC RBC AUTO-ENTMCNC: 33.3 G/DL (ref 31.5–35.7)
MCV RBC AUTO: 96 FL (ref 79–97)
MICRO URNS: NORMAL
MONOCYTES # BLD AUTO: 0.5 X10E3/UL (ref 0.1–0.9)
MONOCYTES NFR BLD AUTO: 9 %
NEUTROPHILS # BLD AUTO: 3.7 X10E3/UL (ref 1.4–7)
NEUTROPHILS NFR BLD AUTO: 64 %
NITRITE UR QL STRIP: NEGATIVE
PH UR STRIP: 6.5 [PH] (ref 5–7.5)
PLATELET # BLD AUTO: 228 X10E3/UL (ref 150–450)
POTASSIUM SERPL-SCNC: 4.4 MMOL/L (ref 3.5–5.2)
PROT SERPL-MCNC: 6.9 G/DL (ref 6–8.5)
PROT UR QL STRIP: NEGATIVE
PSA SERPL-MCNC: 1.1 NG/ML (ref 0–4)
RBC # BLD AUTO: 4.41 X10E6/UL (ref 4.14–5.8)
SODIUM SERPL-SCNC: 141 MMOL/L (ref 134–144)
SP GR UR: 1.02 (ref 1–1.03)
TRIGL SERPL-MCNC: 67 MG/DL (ref 0–149)
TSH SERPL DL<=0.005 MIU/L-ACNC: 1.02 UIU/ML (ref 0.45–4.5)
UROBILINOGEN UR STRIP-MCNC: 0.2 MG/DL (ref 0.2–1)
VLDLC SERPL CALC-MCNC: 13 MG/DL (ref 5–40)
WBC # BLD AUTO: 5.6 X10E3/UL (ref 3.4–10.8)

## 2020-12-09 ENCOUNTER — OFFICE VISIT (OUTPATIENT)
Dept: INTERNAL MEDICINE CLINIC | Age: 70
End: 2020-12-09
Payer: MEDICARE

## 2020-12-09 VITALS
TEMPERATURE: 97.5 F | SYSTOLIC BLOOD PRESSURE: 133 MMHG | OXYGEN SATURATION: 98 % | RESPIRATION RATE: 16 BRPM | HEIGHT: 75 IN | HEART RATE: 63 BPM | BODY MASS INDEX: 22.85 KG/M2 | WEIGHT: 183.8 LBS | DIASTOLIC BLOOD PRESSURE: 82 MMHG

## 2020-12-09 DIAGNOSIS — E78.2 MIXED HYPERLIPIDEMIA: ICD-10-CM

## 2020-12-09 DIAGNOSIS — G89.29 CHRONIC RIGHT SI JOINT PAIN: ICD-10-CM

## 2020-12-09 DIAGNOSIS — Z12.11 SCREENING FOR COLON CANCER: ICD-10-CM

## 2020-12-09 DIAGNOSIS — R93.1 ELEVATED CORONARY ARTERY CALCIUM SCORE: ICD-10-CM

## 2020-12-09 DIAGNOSIS — M53.3 CHRONIC RIGHT SI JOINT PAIN: ICD-10-CM

## 2020-12-09 DIAGNOSIS — Z00.00 MEDICARE ANNUAL WELLNESS VISIT, SUBSEQUENT: Primary | ICD-10-CM

## 2020-12-09 DIAGNOSIS — Z12.5 SCREENING FOR PROSTATE CANCER: ICD-10-CM

## 2020-12-09 DIAGNOSIS — Z13.31 SCREENING FOR DEPRESSION: ICD-10-CM

## 2020-12-09 PROCEDURE — G0439 PPPS, SUBSEQ VISIT: HCPCS | Performed by: INTERNAL MEDICINE

## 2020-12-09 PROCEDURE — 1101F PT FALLS ASSESS-DOCD LE1/YR: CPT | Performed by: INTERNAL MEDICINE

## 2020-12-09 PROCEDURE — G8427 DOCREV CUR MEDS BY ELIG CLIN: HCPCS | Performed by: INTERNAL MEDICINE

## 2020-12-09 PROCEDURE — G8432 DEP SCR NOT DOC, RNG: HCPCS | Performed by: INTERNAL MEDICINE

## 2020-12-09 PROCEDURE — G8420 CALC BMI NORM PARAMETERS: HCPCS | Performed by: INTERNAL MEDICINE

## 2020-12-09 PROCEDURE — 3017F COLORECTAL CA SCREEN DOC REV: CPT | Performed by: INTERNAL MEDICINE

## 2020-12-09 PROCEDURE — 99213 OFFICE O/P EST LOW 20 MIN: CPT | Performed by: INTERNAL MEDICINE

## 2020-12-09 PROCEDURE — G0463 HOSPITAL OUTPT CLINIC VISIT: HCPCS | Performed by: INTERNAL MEDICINE

## 2020-12-09 PROCEDURE — G8536 NO DOC ELDER MAL SCRN: HCPCS | Performed by: INTERNAL MEDICINE

## 2020-12-09 NOTE — PROGRESS NOTES
HISTORY OF PRESENT ILLNESS Mega Garcia is a 79 y.o. male. [facility]  Dictation on: 12/13/2020  3:57 PM by: Matthew Teran [[id number]] Review of Systems Constitutional: Negative for chills, fever and weight loss. Respiratory: Negative. Cardiovascular: Negative for chest pain, palpitations, leg swelling and PND. Gastrointestinal: Positive for blood in stool. Hemorrhoidal   
Genitourinary: Positive for frequency. Musculoskeletal: Positive for back pain. Negative for myalgias. Neurological: Negative for focal weakness. Physical Exam 
Vitals signs and nursing note reviewed. Constitutional:   
   General: He is not in acute distress. Appearance: He is well-developed. HENT:  
   Head: Normocephalic and atraumatic. Right Ear: Tympanic membrane, ear canal and external ear normal.  
   Left Ear: Tympanic membrane, ear canal and external ear normal.  
Eyes:  
   General:     
   Right eye: No discharge. Left eye: No discharge. Pupils: Pupils are equal, round, and reactive to light. Neck: Musculoskeletal: Normal range of motion and neck supple. Thyroid: No thyromegaly. Vascular: No carotid bruit. Cardiovascular:  
   Rate and Rhythm: Normal rate and regular rhythm. Heart sounds: Normal heart sounds. No murmur. No friction rub. No gallop. Pulmonary:  
   Effort: Pulmonary effort is normal. No respiratory distress. Breath sounds: Normal breath sounds. No wheezing or rales. Abdominal:  
   General: Bowel sounds are normal. There is no distension. Palpations: Abdomen is soft. There is no mass. Tenderness: There is no abdominal tenderness. There is no guarding or rebound. Musculoskeletal:     
   General: No tenderness. Lumbar back: He exhibits decreased range of motion, bony tenderness and pain. He exhibits no deformity. Back: 
 
Lymphadenopathy:  
   Cervical: No cervical adenopathy.   
Skin: 
 General: Skin is warm and dry. Findings: No rash. Neurological:  
   Mental Status: He is alert and oriented to person, place, and time. Deep Tendon Reflexes: Reflexes are normal and symmetric. Psychiatric:     
   Behavior: Behavior normal.  
 
 
 
ASSESSMENT and PLAN Diagnoses and all orders for this visit: 
 
1. Medicare annual wellness visit, subsequent 2. Elevated coronary artery calcium score -     URINALYSIS W/ RFLX MICROSCOPIC; Future 3. Mixed hyperlipidemia 
-     TSH 3RD GENERATION; Future -     LIPID PANEL; Future -     CBC WITH AUTOMATED DIFF; Future -     METABOLIC PANEL, COMPREHENSIVE; Future 4. Screening for prostate cancer -     PSA SCREENING (SCREENING); Future 5. Screening for colon cancer 6. Screening for depression 
-     [facility] 7. Chronic right SI joint pain- See patient instructions , Proceed with plan as discussed This is the Subsequent Medicare Annual Wellness Exam, performed 12 months or more after the Initial AWV or the last Subsequent AWV I have reviewed the patient's medical history in detail and updated the computerized patient record. Depression Risk Factor Screening:  
 
3 most recent PHQ Screens 11/19/2019 Little interest or pleasure in doing things Not at all Feeling down, depressed, irritable, or hopeless Not at all Total Score PHQ 2 0 Alcohol Risk Screen Do you average more than 1 drink per night or more than 7 drinks a week: No 
 
In the past three months have you have had more than 4 drinks containing alcohol on one occasion: No 
 
 
 
Functional Ability and Level of Safety:  
Hearing: loss is moderate Activities of Daily Living: The home contains: no safety equipment. Patient does total self care Ambulation: with no difficulty Fall Risk: 
Fall Risk Assessment, last 12 mths 11/19/2019 Able to walk? Yes Fall in past 12 months? No  
 
Abuse Screen: 
Patient is not abused Cognitive Screening Has your family/caregiver stated any concerns about your memory: no 
  
 
 
Assessment/Plan Education and counseling provided: 
Are appropriate based on today's review and evaluation Diagnoses and all orders for this visit: 
 
1. Medicare annual wellness visit, subsequent 2. Elevated coronary artery calcium score 3. Mixed hyperlipidemia 4. Screening for prostate cancer 5. Screening for colon cancer 6. Screening for depression 
-     [place]  Health Maintenance Due Health Maintenance Due Topic Date Due  Shingrix Vaccine Age 50> (1 of 2) 06/29/2000  GLAUCOMA SCREENING Q2Y  05/16/2020  Medicare Yearly Exam  11/19/2020  Lipid Screen  11/19/2020 Patient Care Team  
Patient Care Team: 
eDanna Encarnacion MD as PCP - General (Internal Medicine) Deanna Encarnacion MD as PCP - [facility] Provider Deanna Encarnacion MD (Internal Medicine) History Patient Active Problem List  
Diagnosis Code  Inguinal hernia K40.90  
 Nocturia R35.1  Urinary hesitancy R39.11  
 Mixed hyperlipidemia E78.2  MVP (mitral valve prolapse) I34.1  Unspecified hemorrhoids without mention of complication Q66.1  Allergic rhinitis, cause unspecified J30.9  Sprain of lumbar region S33. [place] Flies  Vitamin D deficiency E55.9  Elevated coronary artery calcium score R93.1  Advance directive discussed with patient Z70.80 Past Medical History:  
Diagnosis Date  Allergic rhinitis, cause unspecified  Arrhythmia   
 heart murmur  Hemorrhoids  Mitral valve prolapse Past Surgical History:  
Procedure Laterality Date [address] [place] UNLISTED    
 [facility]-7/11  ENDOSCOPY, COLON, DIAGNOSTIC    
 2000, 2012 , due 22  
 HX CATARACT REMOVAL    
 HX HEENT    
 wisdom teeth  HX OTHER SURGICAL    
 wisdom teeth  HX RETINAL DETACHMENT REPAIR Current Outpatient Medications Medication Sig Dispense Refill  atorvastatin (LIPITOR) 20 mg tablet TAKE ONE TABLET BY MOUTH DAILY 30 Tab 11 Allergies Allergen Reactions  Adhesive Rash  
  ecg adhesive  Hay Fever And Allergy Relief Unknown (comments) Family History Problem Relation Age of Onset  Hypertension Father  Diabetes Father  Stroke Maternal Aunt   
     cerebral aneurysm  Psychiatric Disorder Paternal Uncle   
     alzheimers  Cancer Paternal Uncle  Cancer Other 70  
     prostate Social History Tobacco Use  Smoking status: Never Smoker  Smokeless tobacco: Never Used Substance Use Topics  Alcohol use: Yes Comment: occasionally

## 2020-12-09 NOTE — PATIENT INSTRUCTIONS
Medicare Wellness Visit, Male The best way to live healthy is to have a lifestyle where you eat a well-balanced diet, exercise regularly, limit alcohol use, and quit all forms of tobacco/nicotine, if applicable. Regular preventive services are another way to keep healthy. Preventive services (vaccines, screening tests, monitoring & exams) can help personalize your care plan, which helps you manage your own care. Screening tests can find health problems at the earliest stages, when they are easiest to treat. [facility] follows the current, evidence-based guidelines published by the [facility] Kendra ([facility]) when recommending preventive services for our patients. Because we follow these guidelines, sometimes recommendations change over time as research supports it. (For example, a prostate screening blood test is no longer routinely recommended for men with no symptoms). Of course, you and your doctor may decide to screen more often for some diseases, based on your risk and co-morbidities (chronic disease you are already diagnosed with). Preventive services for you include: - Medicare offers their members a free annual wellness visit, which is time for you and your primary care provider to discuss and plan for your preventive service needs. Take advantage of this benefit every year! 
-All adults over age 72 should receive the recommended pneumonia vaccines. Current USPSTF guidelines recommend a series of two vaccines for the best pneumonia protection.  
-All adults should have a flu vaccine yearly and tetanus vaccine every 10 years. 
-All adults age 48 and older should receive the shingles vaccines (series of two vaccines).       
-All adults age 38-68 who are overweight should have a diabetes screening test once every three years.  
-Other screening tests & preventive services for persons with diabetes include: an eye exam to screen for diabetic retinopathy, a kidney function test, a foot exam, and stricter control over your cholesterol.  
-Cardiovascular screening for adults with routine risk involves an electrocardiogram (ECG) at intervals determined by the provider.  
-Colorectal cancer screening should be done for adults age 54-65 with no increased risk factors for colorectal cancer. There are a number of acceptable methods of screening for this type of cancer. Each test has its own benefits and drawbacks. Discuss with your provider what is most appropriate for you during your annual wellness visit. The different tests include: colonoscopy (considered the best screening method), a fecal occult blood test, a fecal DNA test, and sigmoidoscopy. 
-All adults born between [facility] should be screened once for Hepatitis C. 
-An Abdominal Aortic Aneurysm (AAA) Screening is recommended for men age 73-68 who has ever smoked in their lifetime. Here is a list of your current Health Maintenance items (your personalized list of preventive services) with a due date: 
Health Maintenance Due Topic Date Due  Shingles Vaccine (1 of 2) 06/29/2000  Glaucoma Screening   05/16/2020 Christine France Annual Well Visit  11/19/2020  Cholesterol Test   11/19/2020 Sacroiliac Pain: Exercises Introduction Here are some examples of exercises for you to try. The exercises may be suggested for a condition or for rehabilitation. Start each exercise slowly. Ease off the exercises if you start to have pain. You will be told when to start these exercises and which ones will work best for you. How to do the exercises Knee-to-chest stretch 1. Do not do the knee-to-chest exercise if it causes or increases back or leg pain. 2. Lie on your back with your knees bent and your feet flat on the floor. You can put a small pillow under your head and neck if it is more comfortable. 3. Grasp your hands under one knee and bring the knee to your chest, keeping the other foot flat on the floor. 4. Keep your lower back pressed to the floor. Hold for at least 15 to 30 seconds. 5. Relax and lower the knee to the starting position. Repeat with the other leg. 6. Repeat 2 to 4 times with each leg. 7. To get more stretch, keep your other leg flat on the floor while pulling your knee to your chest.   
Bridging 1. Lie on your back with both knees bent. Your knees should be bent about 90 degrees. 2. Tighten your belly muscles by pulling in your belly button toward your spine. Then push your feet into the floor, squeeze your buttocks, and lift your hips off the floor until your shoulders, hips, and knees are all in a straight line. 3. Hold for about 6 seconds as you continue to breathe normally, and then slowly lower your hips back down to the floor and rest for up to 10 seconds. 4. Repeat 8 to 12 times. Hip extension 1. Get down on your hands and knees on the floor. 2. Keeping your back and neck straight, lift one leg straight out behind you. When you lift your leg, keep your hips level. Don't let your back twist, and don't let your hip drop toward the floor. 3. Hold for 6 seconds. Repeat 8 to 12 times with each leg. 4. If you feel steady and strong when you do this exercise, you can make it more difficult. To do this, when you lift your leg, also lift the opposite arm straight out in front of you. For example, lift the left leg and the right arm at the same time. (This is sometimes called the \"bird dog exercise. \") Hold for 6 seconds, and repeat 8 to 12 times on each side. Clamshell 1. Lie on your side with a pillow under your head. Keep your feet and knees together and your knees bent. 2. Raise your top knee, but keep your feet together. Do not let your hips roll back. Your legs should open up like a clamshell. 3. Hold for 6 seconds. 4. Slowly lower your knee back down. Rest for 10 seconds. 5. Repeat 8 to 12 times. 6. Switch to your other side and repeat steps 1 through 5. Hamstring wall stretch 1. Lie on your back in a doorway, with one leg through the open door. 2. Slide your affected leg up the wall to straighten your knee. You should feel a gentle stretch down the back of your leg. 3. Hold the stretch for at least 1 minute to begin. Then try to lengthen the time you hold the stretch to as long as 6 minutes. 4. Switch legs, and repeat steps 1 through 3. 
5. Repeat 2 to 4 times. 6. If you do not have a place to do this exercise in a doorway, there is another way to do it: 
7. Lie on your back, and bend one knee. 8. Loop a towel under the ball and toes of that foot, and hold the ends of the towel in your hands. 9. Straighten your knee, and slowly pull back on the towel. You should feel a gentle stretch down the back of your leg. 10. Switch legs, and repeat steps 1 through 3. 
11. Repeat 2 to 4 times. 1. Do not arch your back. 2. Do not bend either knee. 3. Keep one heel touching the floor and the other heel touching the wall. Do not point your toes. Lower abdominal strengthening 1. Lie on your back with your knees bent and your feet flat on the floor. 2. Tighten your belly muscles by pulling your belly button in toward your spine. 3. Lift one foot off the floor and bring your knee toward your chest, so that your knee is straight above your hip and your leg is bent like the letter \"L. \" 
4. Lift the other knee up to the same position. 5. Lower one leg at a time to the starting position. 6. Keep alternating legs until you have lifted each leg 8 to 12 times. 7. Be sure to keep your belly muscles tight and your back still as you are moving your legs. Be sure to breathe normally. Piriformis stretch 1. Lie on your back with your legs straight. 2. Lift your affected leg, and bend your knee.  With your opposite hand, reach across your body, and then gently pull your knee toward your opposite shoulder. 3. Hold the stretch for 15 to 30 seconds. 4. Switch legs and repeat steps 1 through 3. 
5. Repeat 2 to 4 times. Follow-up care is a key part of your treatment and safety. Be sure to make and go to all appointments, and call your doctor if you are having problems. It's also a good idea to know your test results and keep a list of the medicines you take. Where can you learn more? Go to http://www.gray.com/ Enter W272 in the search box to learn more about \"Sacroiliac Pain: Exercises. \" Current as of: March 2, 2020               Content Version: 12.6 © 0220-4074 [employer]. Care instructions adapted under license by [employer] (which disclaims liability or warranty for this information). If you have questions about a medical condition or this instruction, always ask your healthcare professional. [place] [address] any warranty or liability for your use of this information.

## 2020-12-09 NOTE — Clinical Note
HISTORY OF PRESENT ILLNESS Prince Nolan is a 79 y.o. male. HPI Naz Bautista is seen today for a Medicare Wellness Visit and follow up of chronic problems. Medicare Wellness Visit. See attached note. He is due for labs and the shingles vaccine. He is up to date with colonoscopy, other vaccinations and eye exam. Stool testing for occult blood is not indicated. Review of Systems. Notable for some chronic right low back pain. It localizes in the sacroiliac area. There was no injury. Reviewed with him some stretching exercises; see patient instructions. Consider orthopedic referral if the symptoms persist and he will notify me. Review of Systems Constitutional: Negative for chills, fever and weight loss. Respiratory: Negative. Cardiovascular: Negative for chest pain, palpitations, leg swelling and PND. Gastrointestinal: Positive for blood in stool. Hemorrhoidal   
Genitourinary: Positive for frequency. Musculoskeletal: Positive for back pain. Negative for myalgias. Neurological: Negative for focal weakness. Physical Exam 
Vitals signs and nursing note reviewed. Constitutional:   
   General: He is not in acute distress. Appearance: He is well-developed. HENT:  
   Head: Normocephalic and atraumatic. Right Ear: Tympanic membrane, ear canal and external ear normal.  
   Left Ear: Tympanic membrane, ear canal and external ear normal.  
Eyes:  
   General:     
   Right eye: No discharge. Left eye: No discharge. Pupils: Pupils are equal, round, and reactive to light. Neck: Musculoskeletal: Normal range of motion and neck supple. Thyroid: No thyromegaly. Vascular: No carotid bruit. Cardiovascular:  
   Rate and Rhythm: Normal rate and regular rhythm. Heart sounds: Normal heart sounds. No murmur. No friction rub. No gallop. Pulmonary:  
   Effort: Pulmonary effort is normal. No respiratory distress. Breath sounds: Normal breath sounds. No wheezing or rales. Abdominal:  
   General: Bowel sounds are normal. There is no distension. Palpations: Abdomen is soft. There is no mass. Tenderness: There is no abdominal tenderness. There is no guarding or rebound. Musculoskeletal:     
   General: No tenderness. Lumbar back: He exhibits decreased range of motion, bony tenderness and pain. He exhibits no deformity. Back: 
 
Lymphadenopathy:  
   Cervical: No cervical adenopathy. Skin: 
   General: Skin is warm and dry. Findings: No rash. Neurological:  
   Mental Status: He is alert and oriented to person, place, and time. Deep Tendon Reflexes: Reflexes are normal and symmetric. Psychiatric:     
   Behavior: Behavior normal.  
 
 
 
ASSESSMENT and PLAN Diagnoses and all orders for this visit: 
 
1. Medicare annual wellness visit, subsequent 2. Elevated coronary artery calcium score -     URINALYSIS W/ RFLX MICROSCOPIC; Future 3. Mixed hyperlipidemia 
-     TSH 3RD GENERATION; Future -     LIPID PANEL; Future -     CBC WITH AUTOMATED DIFF; Future -     METABOLIC PANEL, COMPREHENSIVE; Future 4. Screening for prostate cancer -     PSA SCREENING (SCREENING); Future 5. Screening for colon cancer 6. Screening for depression 
-     [facility] 7. Chronic right SI joint pain- See patient instructions , Proceed with plan as discussed This is the Subsequent Medicare Annual Wellness Exam, performed 12 months or more after the Initial AWV or the last Subsequent AWV I have reviewed the patient's medical history in detail and updated the computerized patient record. Depression Risk Factor Screening:  
 
3 most recent PHQ Screens 11/19/2019 Little interest or pleasure in doing things Not at all Feeling down, depressed, irritable, or hopeless Not at all Total Score PHQ 2 0 Alcohol Risk Screen Do you average more than 1 drink per night or more than 7 drinks a week: No 
 
In the past three months have you have had more than 4 drinks containing alcohol on one occasion: No 
 
 
 
Functional Ability and Level of Safety:  
Hearing: loss is moderate Activities of Daily Living: The home contains: no safety equipment. Patient does total self care Ambulation: with no difficulty Fall Risk: 
Fall Risk Assessment, last 12 mths 11/19/2019 Able to walk? Yes Fall in past 12 months? No  
 
Abuse Screen: 
Patient is not abused Cognitive Screening Has your family/caregiver stated any concerns about your memory: no 
  
 
 
Assessment/Plan Education and counseling provided: 
Are appropriate based on today's review and evaluation Diagnoses and all orders for this visit: 
 
1. Medicare annual wellness visit, subsequent 2. Elevated coronary artery calcium score 3. Mixed hyperlipidemia 4. Screening for prostate cancer 5. Screening for colon cancer 6. Screening for depression 
-     [place]  Health Maintenance Due Health Maintenance Due Topic Date Due  Shingrix Vaccine Age 50> (1 of 2) 06/29/2000  GLAUCOMA SCREENING Q2Y  05/16/2020  Medicare Yearly Exam  11/19/2020  Lipid Screen  11/19/2020 Patient Care Team  
Patient Care Team: 
Cheryle Zheng MD as PCP - General (Internal Medicine) Cheryle Zheng MD as PCP [facility] Cheryle Zheng MD (Internal Medicine) History Patient Active Problem List  
Diagnosis Code  Inguinal hernia K40.90  
 Nocturia R35.1  Urinary hesitancy R39.11  
 Mixed hyperlipidemia E78.2  MVP (mitral valve prolapse) I34.1  Unspecified hemorrhoids without mention of complication G18.7  Allergic rhinitis, cause unspecified J30.9  Sprain of lumbar region S33. Itzel Rowe  Vitamin D deficiency E55.9  Elevated coronary artery calcium score R93.1  Advance directive discussed with patient Z70.80 Past Medical History:  
Diagnosis Date  Allergic rhinitis, cause unspecified  Arrhythmia   
 heart murmur  Hemorrhoids  Mitral valve prolapse Past Surgical History:  
Procedure Laterality Date [address] [place] UNLISTED    
 [facility]  ENDOSCOPY, COLON, DIAGNOSTIC    
 2000, 2012 , due 22  
 HX CATARACT REMOVAL    
 HX HEENT    
 wisdom teeth  HX OTHER SURGICAL    
 wisdom teeth  HX RETINAL DETACHMENT REPAIR Current Outpatient Medications Medication Sig Dispense Refill  atorvastatin (LIPITOR) 20 mg tablet TAKE ONE TABLET BY MOUTH DAILY 30 Tab 11 Allergies Allergen Reactions  Adhesive Rash  
  ecg adhesive  Hay Fever And Allergy Relief Unknown (comments) Family History Problem Relation Age of Onset  Hypertension Father  Diabetes Father  Stroke Maternal Aunt   
     cerebral aneurysm  Psychiatric Disorder Paternal Uncle   
     alzheimers  Cancer Paternal Uncle  Cancer Other 70  
     prostate Social History Tobacco Use  Smoking status: Never Smoker  Smokeless tobacco: Never Used Substance Use Topics  Alcohol use: Yes Comment: occasionally

## 2020-12-09 NOTE — PROGRESS NOTES
Chief Complaint   Patient presents with    Complete Physical     Reviewed record in preparation for visit and have obtained necessary documentation. Identified pt with two pt identifiers(name and ). Health Maintenance Due   Topic    Shingrix Vaccine Age 49> (1 of 2)    GLAUCOMA SCREENING Q2Y     Medicare Yearly Exam     Lipid Screen          Chief Complaint   Patient presents with    Complete Physical        Wt Readings from Last 3 Encounters:   20 183 lb 12.8 oz (83.4 kg)   19 184 lb (83.5 kg)   18 177 lb (80.3 kg)     Temp Readings from Last 3 Encounters:   20 97.5 °F (36.4 °C) (Temporal)   19 98.1 °F (36.7 °C)   18 98.3 °F (36.8 °C) (Oral)     BP Readings from Last 3 Encounters:   20 133/82   19 138/82   18 136/73     Pulse Readings from Last 3 Encounters:   20 63   19 77   18 89           Learning Assessment:  :     Learning Assessment 2014   PRIMARY LEARNER Patient   PRIMARY LANGUAGE ENGLISH   LEARNER PREFERENCE PRIMARY LISTENING   ANSWERED BY self   RELATIONSHIP SELF       Depression Screening:  :     3 most recent PHQ Screens 2019   Little interest or pleasure in doing things Not at all   Feeling down, depressed, irritable, or hopeless Not at all   Total Score PHQ 2 0       Fall Risk Assessment:  :     Fall Risk Assessment, last 12 mths 2019   Able to walk? Yes   Fall in past 12 months? No       Abuse Screening:  :     No flowsheet data found.     Coordination of Care Questionnaire:  :     1) Have you been to an emergency room, urgent care clinic since your last visit? no   Hospitalized since your last visit? no             2) Have you seen or consulted any other health care providers outside of [address] [place] [address] since your last visit? no  (Include any pap smears or colon screenings in this section.)    3) Do you have an Advance Directive on file? no    4) Are you interested in receiving information on Advance Directives? NO      Patient is accompanied by self I have received verbal consent from Lauren Hogan to discuss any/all medical information while they are present in the room. Reviewed record  In preparation for visit and have obtained necessary documentation.

## 2020-12-10 DIAGNOSIS — E78.2 MIXED HYPERLIPIDEMIA: ICD-10-CM

## 2020-12-10 DIAGNOSIS — R93.1 ELEVATED CORONARY ARTERY CALCIUM SCORE: ICD-10-CM

## 2020-12-10 DIAGNOSIS — Z12.5 SCREENING FOR PROSTATE CANCER: ICD-10-CM

## 2020-12-10 LAB
ALBUMIN SERPL-MCNC: 4.2 G/DL (ref 3.5–5)
ALBUMIN/GLOB SERPL: 1.4 {RATIO} (ref 1.1–2.2)
ALP SERPL-CCNC: 64 U/L (ref 45–117)
ALT SERPL-CCNC: 28 U/L (ref 12–78)
ANION GAP SERPL CALC-SCNC: 6 MMOL/L (ref 5–15)
APPEARANCE UR: CLEAR
AST SERPL-CCNC: 22 U/L (ref 15–37)
BASOPHILS # BLD: 0 K/UL (ref 0–0.1)
BASOPHILS NFR BLD: 1 % (ref 0–1)
BILIRUB SERPL-MCNC: 0.7 MG/DL (ref 0.2–1)
BILIRUB UR QL: NEGATIVE
BUN SERPL-MCNC: 25 MG/DL (ref 6–20)
BUN/CREAT SERPL: 25 (ref 12–20)
CALCIUM SERPL-MCNC: 9.2 MG/DL (ref 8.5–10.1)
CHLORIDE SERPL-SCNC: 103 MMOL/L (ref 97–108)
CHOLEST SERPL-MCNC: 160 MG/DL
CO2 SERPL-SCNC: 28 MMOL/L (ref 21–32)
COLOR UR: NORMAL
CREAT SERPL-MCNC: 0.99 MG/DL (ref 0.7–1.3)
DIFFERENTIAL METHOD BLD: NORMAL
EOSINOPHIL # BLD: 0.1 K/UL (ref 0–0.4)
EOSINOPHIL NFR BLD: 2 % (ref 0–7)
ERYTHROCYTE [DISTWIDTH] IN BLOOD BY AUTOMATED COUNT: 13.2 % (ref 11.5–14.5)
GLOBULIN SER CALC-MCNC: 3 G/DL (ref 2–4)
GLUCOSE SERPL-MCNC: 91 MG/DL (ref 65–100)
GLUCOSE UR STRIP.AUTO-MCNC: NEGATIVE MG/DL
HCT VFR BLD AUTO: 44.5 % (ref 36.6–50.3)
HDLC SERPL-MCNC: 77 MG/DL
HDLC SERPL: 2.1 {RATIO} (ref 0–5)
HGB BLD-MCNC: 14.4 G/DL (ref 12.1–17)
HGB UR QL STRIP: NEGATIVE
IMM GRANULOCYTES # BLD AUTO: 0 K/UL (ref 0–0.04)
IMM GRANULOCYTES NFR BLD AUTO: 0 % (ref 0–0.5)
KETONES UR QL STRIP.AUTO: NEGATIVE MG/DL
LDLC SERPL CALC-MCNC: 70 MG/DL (ref 0–100)
LEUKOCYTE ESTERASE UR QL STRIP.AUTO: NEGATIVE
LIPID PROFILE,FLP: NORMAL
LYMPHOCYTES # BLD: 1.5 K/UL (ref 0.8–3.5)
LYMPHOCYTES NFR BLD: 27 % (ref 12–49)
MCH RBC QN AUTO: 30.5 PG (ref 26–34)
MCHC RBC AUTO-ENTMCNC: 32.4 G/DL (ref 30–36.5)
MCV RBC AUTO: 94.3 FL (ref 80–99)
MONOCYTES # BLD: 0.6 K/UL (ref 0–1)
MONOCYTES NFR BLD: 10 % (ref 5–13)
NEUTS SEG # BLD: 3.4 K/UL (ref 1.8–8)
NEUTS SEG NFR BLD: 60 % (ref 32–75)
NITRITE UR QL STRIP.AUTO: NEGATIVE
NRBC # BLD: 0 K/UL (ref 0–0.01)
NRBC BLD-RTO: 0 PER 100 WBC
PH UR STRIP: 7 [PH] (ref 5–8)
PLATELET # BLD AUTO: 254 K/UL (ref 150–400)
PMV BLD AUTO: 10.3 FL (ref 8.9–12.9)
POTASSIUM SERPL-SCNC: 4.3 MMOL/L (ref 3.5–5.1)
PROT SERPL-MCNC: 7.2 G/DL (ref 6.4–8.2)
PROT UR STRIP-MCNC: NEGATIVE MG/DL
PSA SERPL-MCNC: 1.5 NG/ML (ref 0.01–4)
RBC # BLD AUTO: 4.72 M/UL (ref 4.1–5.7)
SODIUM SERPL-SCNC: 137 MMOL/L (ref 136–145)
SP GR UR REFRACTOMETRY: 1.02 (ref 1–1.03)
TRIGL SERPL-MCNC: 65 MG/DL (ref ?–150)
TSH SERPL DL<=0.05 MIU/L-ACNC: 1.5 UIU/ML (ref 0.36–3.74)
UROBILINOGEN UR QL STRIP.AUTO: 1 EU/DL (ref 0.2–1)
VLDLC SERPL CALC-MCNC: 13 MG/DL
WBC # BLD AUTO: 5.7 K/UL (ref 4.1–11.1)

## 2020-12-14 NOTE — PROGRESS NOTES
Naz Bautista is seen today for a Medicare Wellness Visit and follow up of chronic problems. Medicare Wellness Visit. See attached note. He is due for labs and the shingles vaccine. He is up to date with colonoscopy, other vaccinations and eye exam. Stool testing for occult blood is not indicated. Review of Systems. Notable for some chronic right low back pain. It localizes in the sacroiliac area. There was no injury. Reviewed with him some stretching exercises; see patient instructions. Consider orthopedic referral if the symptoms persist and he will notify me.

## 2021-01-17 DIAGNOSIS — E78.2 MIXED HYPERLIPIDEMIA: ICD-10-CM

## 2021-01-17 DIAGNOSIS — R93.1 ELEVATED CORONARY ARTERY CALCIUM SCORE: ICD-10-CM

## 2021-01-17 RX ORDER — ATORVASTATIN CALCIUM 20 MG/1
TABLET, FILM COATED ORAL
Qty: 90 TAB | Refills: 10 | Status: SHIPPED | OUTPATIENT
Start: 2021-01-17 | End: 2022-01-14 | Stop reason: SDUPTHER

## 2021-01-28 ENCOUNTER — IMMUNIZATION (OUTPATIENT)
Dept: INTERNAL MEDICINE CLINIC | Age: 71
End: 2021-01-28
Payer: MEDICARE

## 2021-01-28 DIAGNOSIS — Z23 ENCOUNTER FOR IMMUNIZATION: Primary | ICD-10-CM

## 2021-01-28 PROCEDURE — 0011A COVID-19, MRNA, LNP-S, PF, 100MCG/0.5ML DOSE(MODERNA): CPT | Performed by: FAMILY MEDICINE

## 2021-01-28 PROCEDURE — 91301 COVID-19, MRNA, LNP-S, PF, 100MCG/0.5ML DOSE(MODERNA): CPT | Performed by: FAMILY MEDICINE

## 2021-02-25 ENCOUNTER — IMMUNIZATION (OUTPATIENT)
Dept: INTERNAL MEDICINE CLINIC | Age: 71
End: 2021-02-25
Payer: MEDICARE

## 2021-02-25 DIAGNOSIS — Z23 ENCOUNTER FOR IMMUNIZATION: Primary | ICD-10-CM

## 2021-02-25 PROCEDURE — 0012A COVID-19, MRNA, LNP-S, PF, 100MCG/0.5ML DOSE(MODERNA): CPT | Performed by: FAMILY MEDICINE

## 2021-02-25 PROCEDURE — 91301 COVID-19, MRNA, LNP-S, PF, 100MCG/0.5ML DOSE(MODERNA): CPT | Performed by: FAMILY MEDICINE

## 2021-08-31 ENCOUNTER — OFFICE VISIT (OUTPATIENT)
Dept: INTERNAL MEDICINE CLINIC | Age: 71
End: 2021-08-31
Payer: MEDICARE

## 2021-08-31 VITALS
BODY MASS INDEX: 22.75 KG/M2 | TEMPERATURE: 97.8 F | SYSTOLIC BLOOD PRESSURE: 129 MMHG | DIASTOLIC BLOOD PRESSURE: 86 MMHG | HEART RATE: 77 BPM | OXYGEN SATURATION: 96 % | RESPIRATION RATE: 16 BRPM | WEIGHT: 183 LBS | HEIGHT: 75 IN

## 2021-08-31 DIAGNOSIS — R22.32 NODULE OF SKIN OF LEFT FOREARM: Primary | ICD-10-CM

## 2021-08-31 PROCEDURE — 1101F PT FALLS ASSESS-DOCD LE1/YR: CPT | Performed by: INTERNAL MEDICINE

## 2021-08-31 PROCEDURE — G8420 CALC BMI NORM PARAMETERS: HCPCS | Performed by: INTERNAL MEDICINE

## 2021-08-31 PROCEDURE — G0463 HOSPITAL OUTPT CLINIC VISIT: HCPCS | Performed by: INTERNAL MEDICINE

## 2021-08-31 PROCEDURE — G8536 NO DOC ELDER MAL SCRN: HCPCS | Performed by: INTERNAL MEDICINE

## 2021-08-31 PROCEDURE — G8510 SCR DEP NEG, NO PLAN REQD: HCPCS | Performed by: INTERNAL MEDICINE

## 2021-08-31 PROCEDURE — G8427 DOCREV CUR MEDS BY ELIG CLIN: HCPCS | Performed by: INTERNAL MEDICINE

## 2021-08-31 PROCEDURE — 99213 OFFICE O/P EST LOW 20 MIN: CPT | Performed by: INTERNAL MEDICINE

## 2021-08-31 PROCEDURE — 3017F COLORECTAL CA SCREEN DOC REV: CPT | Performed by: INTERNAL MEDICINE

## 2021-08-31 NOTE — PROGRESS NOTES
Verified name and birth date for privacy precautions. Chart reviewed in preparation for today's visit. Chief Complaint   Patient presents with    Cyst     on left forearm          Health Maintenance Due   Topic    Shingrix Vaccine Age 50> (1 of 2)         Wt Readings from Last 3 Encounters:   08/31/21 183 lb (83 kg)   12/09/20 183 lb 12.8 oz (83.4 kg)   11/19/19 184 lb (83.5 kg)     Temp Readings from Last 3 Encounters:   08/31/21 97.8 °F (36.6 °C) (Temporal)   12/09/20 97.5 °F (36.4 °C) (Temporal)   11/19/19 98.1 °F (36.7 °C)     BP Readings from Last 3 Encounters:   08/31/21 129/86   12/09/20 133/82   11/19/19 138/82     Pulse Readings from Last 3 Encounters:   08/31/21 77   12/09/20 63   11/19/19 77         Learning Assessment:  :     Learning Assessment 9/25/2014   PRIMARY LEARNER Patient   PRIMARY LANGUAGE ENGLISH   LEARNER PREFERENCE PRIMARY LISTENING   ANSWERED BY self   RELATIONSHIP SELF       Depression Screening:  :     3 most recent PHQ Screens 8/31/2021   Little interest or pleasure in doing things Not at all   Feeling down, depressed, irritable, or hopeless Not at all   Total Score PHQ 2 0       Fall Risk Assessment:  :     Fall Risk Assessment, last 12 mths 8/31/2021   Able to walk? Yes   Fall in past 12 months? 0   Do you feel unsteady? 0   Are you worried about falling 0       Abuse Screening:  :     Abuse Screening Questionnaire 8/31/2021   Do you ever feel afraid of your partner? N   Are you in a relationship with someone who physically or mentally threatens you? N   Is it safe for you to go home?  Jenna Dumont

## 2021-08-31 NOTE — PROGRESS NOTES
Assessment/Plan:     1. Nodule of skin of left forearm  -possible ganglion cyst, but did not move freely. -will ultrasound at this time. - US EXT NONVAS LT LTD; Future         Follow-up Disposition:       Disclaimer:  Return if symptoms worsen or fail to improve. Advised patient to call back or return to office if symptoms worsen/change/persist.     Discussed expected course/resolution/complications of diagnosis in detail with patient. Medication risks/benefits/costs/interactions/alternatives discussed with patient. Patient was given an after visit summary which includes diagnoses, current medications, & vitals. Patient expressed understanding with the diagnosis and plan. Subjective:      Dorian Steel is a 70 y.o. male who presents today for 'lump on left arm'      -noticed a lump on his left forearm about 6 months ago. May be a little larger. Wife insisted that he had it examined. Does not cause pain.  More prominent with certain positions of his arm     Objective:       Physical Exam  Skin:

## 2021-09-01 ENCOUNTER — HOSPITAL ENCOUNTER (OUTPATIENT)
Dept: ULTRASOUND IMAGING | Age: 71
Discharge: HOME | End: 2021-09-01
Attending: INTERNAL MEDICINE
Payer: MEDICARE

## 2021-09-01 DIAGNOSIS — R22.32 NODULE OF SKIN OF LEFT FOREARM: ICD-10-CM

## 2021-09-01 PROCEDURE — 76882 US LMTD JT/FCL EVL NVASC XTR: CPT

## 2021-09-02 ENCOUNTER — TELEPHONE (OUTPATIENT)
Dept: INTERNAL MEDICINE CLINIC | Age: 71
End: 2021-09-02

## 2021-09-02 DIAGNOSIS — R22.32 MASS OF ARM, LEFT: Primary | ICD-10-CM

## 2021-09-02 NOTE — TELEPHONE ENCOUNTER
Marjan [phone] [facility] [place]    FYI    Have you seen the results for the US Ext ?  Just wanting to make sure someone sees them

## 2021-09-08 ENCOUNTER — HOSPITAL ENCOUNTER (OUTPATIENT)
Dept: GENERAL RADIOLOGY | Age: 71
Discharge: HOME | End: 2021-09-08
Attending: INTERNAL MEDICINE
Payer: MEDICARE

## 2021-09-08 ENCOUNTER — HOSPITAL ENCOUNTER (OUTPATIENT)
Dept: MRI IMAGING | Age: 71
Discharge: HOME | End: 2021-09-08
Attending: INTERNAL MEDICINE
Payer: MEDICARE

## 2021-09-08 VITALS — BODY MASS INDEX: 23.12 KG/M2 | WEIGHT: 185 LBS

## 2021-09-08 DIAGNOSIS — R22.32 MASS OF ARM, LEFT: ICD-10-CM

## 2021-09-08 DIAGNOSIS — T85.398S: ICD-10-CM

## 2021-09-08 DIAGNOSIS — T85.398S: Primary | ICD-10-CM

## 2021-09-08 PROCEDURE — 70030 X-RAY EYE FOR FOREIGN BODY: CPT

## 2021-09-08 PROCEDURE — A9576 INJ PROHANCE MULTIPACK: HCPCS

## 2021-09-08 PROCEDURE — 74011250636 HC RX REV CODE- 250/636

## 2021-09-08 PROCEDURE — 73220 MRI UPPR EXTREMITY W/O&W/DYE: CPT

## 2021-09-08 RX ADMIN — GADOTERIDOL 20 ML: 279.3 INJECTION, SOLUTION INTRAVENOUS at 13:03

## 2021-09-08 NOTE — PROGRESS NOTES
9/8/2021 radiology requesting orbital xrays prior to MRI of his forearm due to history of sclera buckle.      Orders Placed This Encounter    XR ORB MIN 4 V     Standing Status:   Future     Standing Expiration Date:   10/8/2022

## 2021-09-12 ENCOUNTER — TELEPHONE (OUTPATIENT)
Dept: INTERNAL MEDICINE CLINIC | Age: 71
End: 2021-09-12

## 2021-09-12 NOTE — TELEPHONE ENCOUNTER
Reviewed MRI- he returned my call after message left .  He's arranged to see Dr Alonso Solomon at [address]

## 2021-09-29 ENCOUNTER — TRANSCRIBE ORDER (OUTPATIENT)
Dept: REGISTRATION | Age: 71
End: 2021-09-29

## 2021-09-29 DIAGNOSIS — Z01.812 ENCOUNTER FOR PREOPERATIVE SCREENING LABORATORY TESTING FOR COVID-19 VIRUS: Primary | ICD-10-CM

## 2021-09-29 DIAGNOSIS — Z20.822 ENCOUNTER FOR PREOPERATIVE SCREENING LABORATORY TESTING FOR COVID-19 VIRUS: Primary | ICD-10-CM

## 2021-10-01 ENCOUNTER — HOSPITAL ENCOUNTER (OUTPATIENT)
Dept: PREADMISSION TESTING | Age: 71
Discharge: HOME | End: 2021-10-01
Payer: MEDICARE

## 2021-10-01 DIAGNOSIS — Z01.812 ENCOUNTER FOR PREOPERATIVE SCREENING LABORATORY TESTING FOR COVID-19 VIRUS: ICD-10-CM

## 2021-10-01 DIAGNOSIS — Z20.822 ENCOUNTER FOR PREOPERATIVE SCREENING LABORATORY TESTING FOR COVID-19 VIRUS: ICD-10-CM

## 2021-10-01 PROCEDURE — U0005 INFEC AGEN DETEC AMPLI PROBE: HCPCS

## 2021-10-03 LAB
SARS-COV-2, XPLCVT: NOT DETECTED
SOURCE, COVRS: NORMAL

## 2021-10-05 NOTE — H&P
CARE TEAM:  Patient Care Team:  Brian Saez MD as PCP - General (Internal Medicine)     ASSESSMENT:  1. Mass of left forearm          There is no problem list on file for this patient.        PLAN:  Treatment Plan:  I have recommended excisional biopsy. He is in agreement. We discussed reasonable expectations. We discussed usual postoperative course. He has given informed consent to proceed     No orders of the defined types were placed in this encounter. Follow-up: Return for first postoperative visit.      HISTORY OF PRESENT ILLNESS:  Chief Complaint: Follow-up of the Left Forearm     Age: 70 y.o. Sex: male   History of present illness: Nishant melo pleasant 70 y.o. male who presents today for evaluation of left forearm mass. He reports a small mass on the dorsal and palmar border of the forearm which has been present for about 6 months. No pain. He recently had ultrasound and MRI. Referred to me for biopsy.     Past medical history, past surgical history, medications, allergies, social history, and review of systems have been reviewed by me. No current facility-administered medications on file prior to encounter.      Current Outpatient Medications on File Prior to Encounter   Medication Sig Dispense Refill    atorvastatin (LIPITOR) 20 mg tablet TAKE ONE TABLET BY MOUTH DAILY 90 Tab 10    [DISCONTINUED] atorvastatin (LIPITOR) 20 mg tablet TAKE ONE TABLET BY MOUTH DAILY 30 Tab 11       Past Medical History:   Diagnosis Date    Allergic rhinitis, cause unspecified     Arrhythmia     heart murmur    Hemorrhoids     Mitral valve prolapse        Allergies   Allergen Reactions    Adhesive Rash     ecg adhesive    Hay Fever And Allergy Relief Unknown (comments)       Social History     Socioeconomic History    Marital status:      Spouse name: Not on file    Number of children: Not on file    Years of education: Not on file    Highest education level: Not on file Occupational History    Not on file   Tobacco Use    Smoking status: Never Smoker    Smokeless tobacco: Never Used   Substance and Sexual Activity    Alcohol use: Yes     Comment: occasionally    Drug use: No    Sexual activity: Not on file   Other Topics Concern    Not on file   Social History Narrative    Not on file     Social Determinants of Health     Financial Resource Strain:     Difficulty of Paying Living Expenses:    Food Insecurity:     Worried About Running Out of Food in the Last Year:     [address] in the Last Year:    Transportation Needs:     Lack of Transportation (Medical):  Lack of Transportation (Non-Medical):    Physical Activity:     Days of Exercise per Week:     Minutes of Exercise per Session:    Stress:     Feeling of Stress :    Social Connections:     Frequency of Communication with Friends and Family:     Frequency of Social Gatherings with Friends and Family:     Attends Evangelical Services:     Active Member of Clubs or Organizations:     Attends Club or Organization Meetings:     Marital Status:    Intimate Partner Violence:     Fear of Current or Ex-Partner:     Emotionally Abused:     Physically Abused:     Sexually Abused:           Review of Systems   9/14/2021     Constitutional: Unexplained: Negative  Genitourinary: Frequent Urination: Negative  HEENT: Vision Loss: Negative  Neurological: Memory Loss: Negative  Integumentary: Rash: Negative  Cardiovascular: Palpatations: Negative  Hematologic: Bruises/Bleeds Easily: Negative  Gastrointestinal: Constipation: Negative  Immunological: Seasonal Allergies: Positive  Musculoskeletal: Joint Pain: Positive        OBJECTIVE:  Constitutional:  No acute distress. Pleasant and cooperative with exam.  HEENT: Normocephalic. Atraumatic. Eyes are clear  Hearing intact to spoken word   Respiratory:  Breathing unlabored. .  Cardiovascular:  No marked edema. Psychiatric: Alert.  Affect appropriate.   Gait: Normal.  Musculoskeletal    Skin is intact. He has a small mass in the deep soft tissues of the dorsal and ulnar distal forearm. This is adjacent to the ulna. Nontender. No erythema. No warmth.     IMAGING / STUDIES:           No imaging obtained       Independently reviewed ultrasound an MRI. He has a solid enhancing mass of the distal forearm. This appears to be in the fascial plane.

## 2021-10-06 ENCOUNTER — HOSPITAL ENCOUNTER (OUTPATIENT)
Age: 71
Setting detail: OUTPATIENT SURGERY
Discharge: HOME | End: 2021-10-06
Attending: ORTHOPAEDIC SURGERY | Admitting: ORTHOPAEDIC SURGERY
Payer: MEDICARE

## 2021-10-06 VITALS
SYSTOLIC BLOOD PRESSURE: 135 MMHG | DIASTOLIC BLOOD PRESSURE: 84 MMHG | RESPIRATION RATE: 16 BRPM | WEIGHT: 190 LBS | TEMPERATURE: 97.6 F | BODY MASS INDEX: 23.62 KG/M2 | HEIGHT: 75 IN | HEART RATE: 72 BPM | OXYGEN SATURATION: 100 %

## 2021-10-06 PROCEDURE — 2709999900 HC NON-CHARGEABLE SUPPLY: Performed by: ORTHOPAEDIC SURGERY

## 2021-10-06 PROCEDURE — 77030002916 HC SUT ETHLN J&J -A: Performed by: ORTHOPAEDIC SURGERY

## 2021-10-06 PROCEDURE — 77030013479 HC CUF TRNQ COT DGT MARM -A: Performed by: ORTHOPAEDIC SURGERY

## 2021-10-06 PROCEDURE — 77030040356 HC CORD BPLR FRCP COVD -A: Performed by: ORTHOPAEDIC SURGERY

## 2021-10-06 PROCEDURE — 88307 TISSUE EXAM BY PATHOLOGIST: CPT

## 2021-10-06 PROCEDURE — 76030000002 HC AMB SURG OR TIME FIRST 0.: Performed by: ORTHOPAEDIC SURGERY

## 2021-10-06 PROCEDURE — 76210000040 HC AMBSU PH I REC FIRST 0.5 HR: Performed by: ORTHOPAEDIC SURGERY

## 2021-10-06 NOTE — DISCHARGE INSTRUCTIONS
Dr. Kurt Linn Postoperative Instructions    1. Please maintain the dressing placed at surgery for 5 days. You may remove it in 5 days. Please keep the dressing clean and dry for 5 days. In 5 days you may get the wound wet and then cover it with a bandaid. If you have any questions or problems with your dressing, please call our office at [phone]. 2. Please elevate the operative extremity to the level of the heart to keep swelling at a minimum. You may get up to move around, but when seated please keep the extremity elevated as much as possible. This will decrease swelling and postoperative pain. You may do activities as tolerated. 3. You may ice your arm/hand 5 times a day for 20 minutes at a time. 4. Take over the counter medications as needed for pain. 5. Signs and symptoms of infection include: redness, increased pain, increased swelling not relieved by elevation, drainage, fever, or chills, If you develop any of these symptoms, call the office at [phone]. 6. Please Follow-up at my office 14 days after surgery or when specified at your pre-operative appointment. DISCHARGE SUMMARY from Nurse    PATIENT INSTRUCTIONS:    After general anesthesia or intravenous sedation, for 24 hours or while taking prescription Narcotics:  · Limit your activities  · Do not drive and operate hazardous machinery  · Do not make important personal or business decisions  · Do  not drink alcoholic beverages  · If you have not urinated within 8 hours after discharge, please contact your surgeon on call.     Report the following to your surgeon:  · Excessive pain, swelling, redness or odor of or around the surgical area  · Temperature over 100.5  · Nausea and vomiting lasting longer than 4 hours or if unable to take medications  · Any signs of decreased circulation or nerve impairment to extremity: change in color, persistent  numbness, tingling, coldness or increase pain  · Any questions    What to do at Home:  Recommended activity: See surgical instructions. If you experience any of the following symptoms as noted above, please follow up with Dr. Kat Johnson. *  Please give a list of your current medications to your Primary Care Provider. *  Please update this list whenever your medications are discontinued, doses are      changed, or new medications (including over-the-counter products) are added. *  Please carry medication information at all times in case of emergency situations. These are general instructions for a healthy lifestyle:    No smoking/ No tobacco products/ Avoid exposure to second hand smoke  Surgeon General's Warning:  Quitting smoking now greatly reduces serious risk to your health. Obesity, smoking, and sedentary lifestyle greatly increases your risk for illness    A healthy diet, regular physical exercise & weight monitoring are important for maintaining a healthy lifestyle    You may be retaining fluid if you have a history of heart failure or if you experience any of the following symptoms:  Weight gain of 3 pounds or more overnight or 5 pounds in a week, increased swelling in our hands or feet or shortness of breath while lying flat in bed. Please call your doctor as soon as you notice any of these symptoms; do not wait until your next office visit. The discharge information has been reviewed with the patient. The patient verbalized understanding. Discharge medications reviewed with the patient and appropriate educational materials and side effects teaching were provided.

## 2021-10-06 NOTE — INTERVAL H&P NOTE
Update History & Physical    The Patient's History and Physical was reviewed with the patient and I examined the patient. There was no change. The surgical site was confirmed by the patient and me. Plan:  The risk, benefits, expected outcome, and alternative to the recommended procedure have been discussed with the patient. Patient understands and wants to proceed with the procedure.     Electronically signed by Wilma Lawrence MD on 10/6/2021 at 7:18 AM

## 2021-10-06 NOTE — BRIEF OP NOTE
Brief Postoperative Note    Patient: Emerald Humphrey  YOB: 1950  MRN: [id number]    Date of Procedure: 10/6/2021     Pre-Op Diagnosis: MASS OF LEFT FOREARM    Post-Op Diagnosis: Same as preoperative diagnosis.       Procedure(s):  EXCISION MASS OF LEFT FOREARM    Surgeon(s):  Elvis Faye MD    Surgical Assistant: None    Anesthesia: Local     Estimated Blood Loss (mL): Minimal    Complications: None    Specimens:   ID Type Source Tests Collected by Time Destination   1 : LEFT FOREARM MASS Fresh   Elvis Faye MD 10/6/2021 6883 Pathology        Implants: * No implants in log *    Drains: * No LDAs found *    Findings: deep soft tissue mass    Electronically Signed by Gloria Hugo MD on 10/6/2021 at 7:51 AM

## 2021-10-06 NOTE — OP NOTES
[facility]  OPERATIVE REPORT    Name:  Michele Cedeno  MR#:  [id number]  :  1950  ACCOUNT #:  [de-identified]  DATE OF SERVICE:  10/06/2021      PREOPERATIVE DIAGNOSIS:  Mass, left forearm. POSTOPERATIVE DIAGNOSIS:  Mass, left forearm. PROCEDURE PERFORMED:  Excisional biopsy mass, left forearm. SURGEON:  Tona Sandoval MD    ASSISTANT:  None. ANESTHESIA:  Local.    COMPLICATIONS:  None. SPECIMENS REMOVED:  Mass, left forearm. IMPLANTS:  None. ESTIMATED BLOOD LOSS:  Minimal.    INDICATIONS FOR THE PROCEDURE:  This is a 77-year-old male with a mass on the ulnar border of the forearm. He is indicated for excisional biopsy. I have discussed risks, benefits, potential complications, and alternatives of surgery with him and he has given informed consent to proceed. DESCRIPTION OF THE PROCEDURE:  The patient was identified in the preoperative holding area. Informed consent was obtained. The operative site was marked. The planned surgical site was then anesthetized with 1% lidocaine and 0.5% Marcaine. He was then transported to the OR and placed supine on the operating room table. All bony prominences were well padded. A tourniquet was applied to the upper brachium. The left upper extremity was then sterilely prepped and draped in usual fashion. Surgical time-out was held. The operative site was confirmed. Preoperative antibiotics were not used. After gravity exsanguination, the tourniquet was elevated to 250 mmHg. A longitudinal incision was made directly over the mass. Blunt dissection was performed. The mass was found to be deep to the fascia on the ulnar border of the forearm near the ulna but not adjacent to the ulna. The mass was easily dissected free of surrounding soft tissues and was excised. Bleeding was carefully controlled. The wound was thoroughly irrigated. Skin was closed with interrupted 4-0 nylon sutures.   Sterile dressings were applied. Tourniquet was let down and brisk capillary refill returned to the digits. He was transferred to the PACU in stable condition without complication.         Talia Weber MD      [id number]  D:  10/06/2021 7:54  T:  10/06/2021 8:17  JOB #:  [id number]

## 2021-12-01 ENCOUNTER — OFFICE VISIT (OUTPATIENT)
Dept: ORTHOPEDIC SURGERY | Age: 71
End: 2021-12-01
Payer: MEDICARE

## 2021-12-01 VITALS — BODY MASS INDEX: 23.62 KG/M2 | WEIGHT: 190 LBS | HEIGHT: 75 IN

## 2021-12-01 DIAGNOSIS — G89.29 CHRONIC RIGHT HIP PAIN: ICD-10-CM

## 2021-12-01 DIAGNOSIS — M16.11 PRIMARY OSTEOARTHRITIS OF RIGHT HIP: Primary | ICD-10-CM

## 2021-12-01 DIAGNOSIS — M25.551 CHRONIC RIGHT HIP PAIN: ICD-10-CM

## 2021-12-01 PROCEDURE — 1101F PT FALLS ASSESS-DOCD LE1/YR: CPT | Performed by: ORTHOPAEDIC SURGERY

## 2021-12-01 PROCEDURE — 99204 OFFICE O/P NEW MOD 45 MIN: CPT | Performed by: ORTHOPAEDIC SURGERY

## 2021-12-01 PROCEDURE — 3017F COLORECTAL CA SCREEN DOC REV: CPT | Performed by: ORTHOPAEDIC SURGERY

## 2021-12-01 PROCEDURE — G8432 DEP SCR NOT DOC, RNG: HCPCS | Performed by: ORTHOPAEDIC SURGERY

## 2021-12-01 PROCEDURE — G8536 NO DOC ELDER MAL SCRN: HCPCS | Performed by: ORTHOPAEDIC SURGERY

## 2021-12-01 PROCEDURE — G8420 CALC BMI NORM PARAMETERS: HCPCS | Performed by: ORTHOPAEDIC SURGERY

## 2021-12-01 PROCEDURE — G8427 DOCREV CUR MEDS BY ELIG CLIN: HCPCS | Performed by: ORTHOPAEDIC SURGERY

## 2021-12-01 NOTE — PROGRESS NOTES
Lonnie Harris (: 1950) is a 70 y.o. male, patient, here for evaluation of the following chief complaint(s):  Hip Pain (right hip)       SUBJECTIVE/OBJECTIVE:  Lonnie Harris presents today complaining of progressive right hip pain. We have seen him once for this issue back in . No treatment since then. Hip pain has waxed and waned over the last few years, but significant increase in pain and difficulty with mobility since August.  Trouble going from sit to stand, with significant pain and stiffness for up to 5 minutes. Once he has mobilized, pain lessens and allows him to walk indefinitely. Occasional wakening night pain. Pain in the groin with radiation into his thigh upon standing. Popping is present. Difficulty putting on his socks and shoes and getting in and out of the car. Relates subjective leg length discrepancy with the right feeling long. PHYSICAL EXAM:  Vitals: Ht 6' 3\" (1.905 m)   Wt 190 lb (86.2 kg)   BMI 23.75 kg/m²   Body mass index is 23.75 kg/m². 70y.o. year old M in no acute distress. Ambulates with mild Trendelenburg gait on the right side. Pain-free motion lumbar spine. No nerve tension signs. Painful range of motion of right hip with very limited rotation. Positive Stinchfield on the right side. Symmetrical palpable distal pulses. No gross motor or sensory deficits in lower extremities. No distal edema. Apparent leg length discrepancy with the right approximately 1 cm short. IMAGING:  Radiographs: XR Results (most recent):  Results from Appointment encounter on 21    XR HIP RT W OR WO PELV 2-3 VWS    Narrative  3 x-ray views of right hip including weightbearing AP pelvis x-ray, AP and frog lateral images demonstrate complete loss of medial and posterior medial right hip joint space. Severe peripheral osteophyte formation, subchondral cystic changes on both sides of the joint. ASSESSMENT/PLAN:  1.  Primary osteoarthritis of right hip  -     REFERRAL TO PHYSICAL THERAPY  2. Chronic right hip pain  -     XR HIP RT W OR WO PELV 2-3 VWS; Future    The xray and exam findings were discussed with the patient today. Severe right hip osteoarthritis with progressive symptoms. We discussed continued conservative treatment measures vs right total hip replacement. Symptoms have progressed despite conservative treatment measures outlined above and he desires to proceed with right total hip replacement. Discussed risks, benefits, and alternatives in detail, as well as anticipated hospital stay and course of rehabilitation. He will see primary care physician prior to surgery. All questions answered. Plan to use aspirin for DVT prophylaxis and IV TXA. He wishes to participate in a few weeks of formal physical therapy for rehabilitation exercises. Prescription provided. Return for surgery. Review Of Systems  ROS     Positive for: Musculoskeletal    Last edited by Nathen Boo RN on 12/1/2021 11:19 AM. (History)         Patient denies any recent fever, chills, nausea, vomiting, chest pain, or shortness of breath. Allergies   Allergen Reactions    Adhesive Rash     ecg adhesive    Hay Fever And Allergy Relief Unknown (comments)       Current Outpatient Medications   Medication Sig    atorvastatin (LIPITOR) 20 mg tablet TAKE ONE TABLET BY MOUTH DAILY     No current facility-administered medications for this visit.        Past Medical History:   Diagnosis Date    Allergic rhinitis, cause unspecified     Arrhythmia     heart murmur    Hemorrhoids     Mitral valve prolapse         Past Surgical History:   Procedure Laterality Date    ENDOSCOPY, COLON, DIAGNOSTIC      2000, 2012 , due 25    HX CATARACT REMOVAL      HX HEENT      wisdom teeth    HX OTHER SURGICAL      wisdom teeth    HX RETINAL DETACHMENT REPAIR      [place] ABDOMEN SURGERY PROC UNLISTED      [facility]-7/11       Family History   Problem Relation Age of Onset    Hypertension Father     Diabetes Father     Stroke Maternal Aunt         cerebral aneurysm    Psychiatric Disorder Paternal Uncle         alzheimers    Cancer Paternal Uncle     Cancer Other 79        prostate        Social History     Socioeconomic History    Marital status:      Spouse name: Not on file    Number of children: Not on file    Years of education: Not on file    Highest education level: Not on file   Occupational History    Not on file   Tobacco Use    Smoking status: Never Smoker    Smokeless tobacco: Never Used   Substance and Sexual Activity    Alcohol use: Yes     Comment: occasionally    Drug use: No    Sexual activity: Not on file   Other Topics Concern    Not on file   Social History Narrative    Not on file     Social Determinants of Health     Financial Resource Strain:     Difficulty of Paying Living Expenses: Not on file   Food Insecurity:     Worried About [address] in the Last Year: Not on file    Lore of Food in the Last Year: Not on file   Transportation Needs:     Lack of Transportation (Medical): Not on file    Lack of Transportation (Non-Medical):  Not on file   Physical Activity:     Days of Exercise per Week: Not on file    Minutes of Exercise per Session: Not on file   Stress:     Feeling of Stress : Not on file   Social Connections:     Frequency of Communication with Friends and Family: Not on file    Frequency of Social Gatherings with Friends and Family: Not on file    Attends Scientologist Services: Not on file    Active Member of Clubs or Organizations: Not on file    Attends Club or Organization Meetings: Not on file    Marital Status: Not on file   Intimate Partner Violence:     Fear of Current or Ex-Partner: Not on file    Emotionally Abused: Not on file    Physically Abused: Not on file    Sexually Abused: Not on file   Housing Stability:     Unable to Pay for Housing in the Last Year: Not on file    Number of Places Lived in the Last Year: Not on file    Unstable Housing in the Last Year: Not on file       Orders Placed This Encounter    XR HIP RT W OR WO PELV 2-3 VWS     Room 3c     Standing Status:   Future     Number of Occurrences:   1     Standing Expiration Date:   12/2/2022    REFERRAL TO PHYSICAL THERAPY     Referral Priority:   Routine     Referral Type:   PT/OT/ST     Referral Reason:   Specialty Services Required     Requested Specialty:   Physical Therapy     Number of Visits Requested:   1

## 2021-12-09 DIAGNOSIS — M16.11 PRIMARY OSTEOARTHRITIS OF RIGHT HIP: Primary | ICD-10-CM

## 2021-12-13 ENCOUNTER — OFFICE VISIT (OUTPATIENT)
Dept: INTERNAL MEDICINE CLINIC | Age: 71
End: 2021-12-13
Payer: MEDICARE

## 2021-12-13 VITALS
TEMPERATURE: 98 F | BODY MASS INDEX: 22.95 KG/M2 | DIASTOLIC BLOOD PRESSURE: 78 MMHG | WEIGHT: 184.6 LBS | HEIGHT: 75 IN | OXYGEN SATURATION: 99 % | HEART RATE: 71 BPM | RESPIRATION RATE: 20 BRPM | SYSTOLIC BLOOD PRESSURE: 129 MMHG

## 2021-12-13 DIAGNOSIS — R93.1 ELEVATED CORONARY ARTERY CALCIUM SCORE: ICD-10-CM

## 2021-12-13 DIAGNOSIS — E78.2 MIXED HYPERLIPIDEMIA: ICD-10-CM

## 2021-12-13 DIAGNOSIS — H90.3 SENSORINEURAL HEARING LOSS (SNHL) OF BOTH EARS: ICD-10-CM

## 2021-12-13 DIAGNOSIS — Z13.31 SCREENING FOR DEPRESSION: ICD-10-CM

## 2021-12-13 DIAGNOSIS — R22.32 MASS OF ARM, LEFT: ICD-10-CM

## 2021-12-13 DIAGNOSIS — Z12.11 SCREENING FOR COLON CANCER: ICD-10-CM

## 2021-12-13 DIAGNOSIS — R73.01 IFG (IMPAIRED FASTING GLUCOSE): ICD-10-CM

## 2021-12-13 DIAGNOSIS — Z00.00 MEDICARE ANNUAL WELLNESS VISIT, SUBSEQUENT: Primary | ICD-10-CM

## 2021-12-13 DIAGNOSIS — Z12.5 SCREENING FOR PROSTATE CANCER: ICD-10-CM

## 2021-12-13 PROCEDURE — G8510 SCR DEP NEG, NO PLAN REQD: HCPCS | Performed by: INTERNAL MEDICINE

## 2021-12-13 PROCEDURE — 1101F PT FALLS ASSESS-DOCD LE1/YR: CPT | Performed by: INTERNAL MEDICINE

## 2021-12-13 PROCEDURE — G8536 NO DOC ELDER MAL SCRN: HCPCS | Performed by: INTERNAL MEDICINE

## 2021-12-13 PROCEDURE — G0439 PPPS, SUBSEQ VISIT: HCPCS | Performed by: INTERNAL MEDICINE

## 2021-12-13 PROCEDURE — 3017F COLORECTAL CA SCREEN DOC REV: CPT | Performed by: INTERNAL MEDICINE

## 2021-12-13 PROCEDURE — G8427 DOCREV CUR MEDS BY ELIG CLIN: HCPCS | Performed by: INTERNAL MEDICINE

## 2021-12-13 PROCEDURE — G8420 CALC BMI NORM PARAMETERS: HCPCS | Performed by: INTERNAL MEDICINE

## 2021-12-13 NOTE — PROGRESS NOTES
This is the Subsequent Medicare Annual Wellness Exam, performed 12 months or more after the Initial AWV or the last Subsequent AWV    I have reviewed the patient's medical history in detail and updated the computerized patient record. Assessment/Plan   Education and counseling provided:  Are appropriate based on today's review and evaluation    1. Medicare annual wellness visit, subsequent  2. Mass of arm, left  3. Elevated coronary artery calcium score  4. Mixed hyperlipidemia  5. Screening for prostate cancer  6. Screening for colon cancer  7. Screening for depression  -     DEPRESSION SCREEN ANNUAL       Depression Risk Factor Screening     3 most recent PHQ Screens 12/13/2021   Little interest or pleasure in doing things Not at all   Feeling down, depressed, irritable, or hopeless Not at all   Total Score PHQ 2 0       Alcohol Risk Screen    Do you average more than 1 drink per night or more than 7 drinks a week: No- 1 per wk    In the past three months have you have had more than 4 drinks containing alcohol on one occasion: No        Functional Ability and Level of Safety    Hearing: loss is mild      Activities of Daily Living: The home contains: no safety equipment. Patient does total self care      Ambulation: with mild difficulty     Fall Risk:  Fall Risk Assessment, last 12 mths 12/13/2021   Able to walk? Yes   Fall in past 12 months? 0   Do you feel unsteady?  0   Are you worried about falling 0      Abuse Screen:  Patient is not abused       Cognitive Screening    Has your family/caregiver stated any concerns about your memory: no         Health Maintenance Due     Health Maintenance Due   Topic Date Due    Shingrix Vaccine Age 49> (1 of 2) Never done    Lipid Screen  12/10/2021       Patient Care Team   Patient Care Team:  Sangeeta Laureano MD as PCP - General (Internal Medicine)  Sangeeta Laureano MD as PCP - REHABILITATION [facility] Empaneled Provider  Sangeeta Laureano MD (Internal Medicine)    History     Patient Active Problem List   Diagnosis Code    Inguinal hernia K40.90    Nocturia R35.1    Urinary hesitancy R39.11    Mixed hyperlipidemia E78.2    MVP (mitral valve prolapse) I34.1    Unspecified hemorrhoids without mention of complication N37.8    Allergic rhinitis, cause unspecified J30.9    Sprain of lumbar region S33. 5XXA    Vitamin D deficiency E55.9    Elevated coronary artery calcium score R93.1    Advance directive discussed with patient Z70.80     Past Medical History:   Diagnosis Date    Allergic rhinitis, cause unspecified     Arrhythmia     heart murmur    Hemorrhoids     Mitral valve prolapse       Past Surgical History:   Procedure Laterality Date    ENDOSCOPY, COLON, DIAGNOSTIC      2000, 2012 , due 25    HX CATARACT REMOVAL      HX HEENT      wisdom teeth    HX OTHER SURGICAL      wisdom teeth    HX RETINAL DETACHMENT REPAIR      [place] ABDOMEN SURGERY PROC UNLISTED      [facility]-7/11     Current Outpatient Medications   Medication Sig Dispense Refill    atorvastatin (LIPITOR) 20 mg tablet TAKE ONE TABLET BY MOUTH DAILY 90 Tab 10     Allergies   Allergen Reactions    Adhesive Rash     ecg adhesive    Hay Fever And Allergy Relief Unknown (comments)       Family History   Problem Relation Age of Onset    Hypertension Father     Diabetes Father     Stroke Maternal Aunt         cerebral aneurysm    Psychiatric Disorder Paternal Uncle         alzheimers    Cancer Paternal Uncle     Cancer Other 79        prostate     Social History     Tobacco Use    Smoking status: Never Smoker    Smokeless tobacco: Never Used   Substance Use Topics    Alcohol use: Yes     Comment: occasionally         Michi Sagastume MD

## 2021-12-13 NOTE — PROGRESS NOTES
HISTORY OF PRESENT ILLNESS  Deborralyssa Champion is a 70 y.o. male. HPI  Assessment: Jorge L Soriano is seen today for a Medicare Wellness Visit and follow up of chronic problems. Preventive Care: See attached Wellness Visit note. He is due for shingles vaccine and routine labs. He is up to date with other vaccinations and a colonoscopy. Stool testing for occult blood is not indicated given fairly recent colonoscopy. Chronic Problems Reviewed: Total hip replacement is pending. I reviewed with him he likely will need to return for a preop check. Cholesterol is due to assess benefits of prescription medication. Review of Systems   Constitutional: Negative for weight loss. HENT: Positive for hearing loss. Respiratory: Negative. Cardiovascular: Negative for chest pain, palpitations, leg swelling and PND. Gastrointestinal: Negative. Negative for heartburn. Occ hemorrhoid issues   Genitourinary: Negative. Musculoskeletal: Positive for joint pain. Negative for myalgias. Neurological: Negative for focal weakness. Physical Exam  Vitals and nursing note reviewed. Constitutional:       General: He is not in acute distress. Appearance: He is well-developed. HENT:      Head: Normocephalic and atraumatic. Right Ear: Tympanic membrane, ear canal and external ear normal.      Left Ear: Tympanic membrane, ear canal and external ear normal.   Eyes:      General:         Right eye: No discharge. Left eye: No discharge. Pupils: Pupils are equal, round, and reactive to light. Neck:      Thyroid: No thyromegaly. Vascular: No carotid bruit. Cardiovascular:      Rate and Rhythm: Normal rate and regular rhythm. Heart sounds: Normal heart sounds. No murmur heard. No friction rub. No gallop. Pulmonary:      Effort: Pulmonary effort is normal. No respiratory distress. Breath sounds: Normal breath sounds. No wheezing or rales.    Abdominal:      General: Bowel sounds are normal. There is no distension. Palpations: Abdomen is soft. There is no mass. Tenderness: There is no abdominal tenderness. There is no guarding or rebound. Musculoskeletal:         General: No tenderness. Normal range of motion. Cervical back: Normal range of motion and neck supple. Lymphadenopathy:      Cervical: No cervical adenopathy. Skin:     General: Skin is warm and dry. Findings: No rash. Neurological:      Mental Status: He is alert and oriented to person, place, and time. Deep Tendon Reflexes: Reflexes are normal and symmetric. Psychiatric:         Behavior: Behavior normal.         ASSESSMENT and PLAN  Diagnoses and all orders for this visit:    1. Medicare annual wellness visit, subsequent    2. Mass of arm, left    3. Elevated coronary artery calcium score  -     METABOLIC PANEL, BASIC; Future    4. Mixed hyperlipidemia  -     CBC WITH AUTOMATED DIFF; Future  -     LIPID PANEL; Future  -     HEPATIC FUNCTION PANEL; Future  -     TSH 3RD GENERATION; Future  -     URINALYSIS W/ RFLX MICROSCOPIC; Future    5. Screening for prostate cancer  -     PSA SCREENING (SCREENING); Future    6. Screening for colon cancer    7. Screening for depression  -     DEPRESSION SCREEN ANNUAL    8. IFG (impaired fasting glucose)  -     HEMOGLOBIN A1C WITH EAG; Future    9.  Sensorineural hearing loss (SNHL) of both ears  -     REFERRAL TO ENT-OTOLARYNGOLOGY

## 2021-12-13 NOTE — PATIENT INSTRUCTIONS
Medicare Wellness Visit, Male    The best way to live healthy is to have a lifestyle where you eat a well-balanced diet, exercise regularly, limit alcohol use, and quit all forms of tobacco/nicotine, if applicable. Regular preventive services are another way to keep healthy. Preventive services (vaccines, screening tests, monitoring & exams) can help personalize your care plan, which helps you manage your own care. Screening tests can find health problems at the earliest stages, when they are easiest to treat. [facility] follows the current, evidence-based guidelines published by the [facility] Hilario Kendra ([facility]) when recommending preventive services for our patients. Because we follow these guidelines, sometimes recommendations change over time as research supports it. (For example, a prostate screening blood test is no longer routinely recommended for men with no symptoms). Of course, you and your doctor may decide to screen more often for some diseases, based on your risk and co-morbidities (chronic disease you are already diagnosed with). Preventive services for you include:  - Medicare offers their members a free annual wellness visit, which is time for you and your primary care provider to discuss and plan for your preventive service needs. Take advantage of this benefit every year!  -All adults over age 72 should receive the recommended pneumonia vaccines. Current USPSTF guidelines recommend a series of two vaccines for the best pneumonia protection.   -All adults should have a flu vaccine yearly and tetanus vaccine every 10 years.  -All adults age 48 and older should receive the shingles vaccines (series of two vaccines).        -All adults age 38-68 who are overweight should have a diabetes screening test once every three years.   -Other screening tests & preventive services for persons with diabetes include: an eye exam to screen for diabetic retinopathy, a kidney function test, a foot exam, and stricter control over your cholesterol.   -Cardiovascular screening for adults with routine risk involves an electrocardiogram (ECG) at intervals determined by the provider.   -Colorectal cancer screening should be done for adults age 54-65 with no increased risk factors for colorectal cancer. There are a number of acceptable methods of screening for this type of cancer. Each test has its own benefits and drawbacks. Discuss with your provider what is most appropriate for you during your annual wellness visit. The different tests include: colonoscopy (considered the best screening method), a fecal occult blood test, a fecal DNA test, and sigmoidoscopy.  -All adults born between [facility] should be screened once for Hepatitis C.  -An Abdominal Aortic Aneurysm (AAA) Screening is recommended for men age 73-68 who has ever smoked in their lifetime.      Here is a list of your current Health Maintenance items (your personalized list of preventive services) with a due date:  Health Maintenance Due   Topic Date Due    Shingles Vaccine (1 of 2) Never done    Cholesterol Test   12/10/2021

## 2021-12-14 ENCOUNTER — HOSPITAL ENCOUNTER (OUTPATIENT)
Dept: PHYSICAL THERAPY | Age: 71
Discharge: HOME | End: 2021-12-14
Attending: PHYSICIAN ASSISTANT
Payer: MEDICARE

## 2021-12-14 DIAGNOSIS — M16.11 PRIMARY OSTEOARTHRITIS OF RIGHT HIP: ICD-10-CM

## 2021-12-14 PROCEDURE — 97110 THERAPEUTIC EXERCISES: CPT | Performed by: PHYSICAL THERAPIST

## 2021-12-14 PROCEDURE — 97161 PT EVAL LOW COMPLEX 20 MIN: CPT | Performed by: PHYSICAL THERAPIST

## 2021-12-14 NOTE — PROGRESS NOTES
[facility] [place] [facility]  [address] [facility], [address]  Phone: [phone]  Fax: [phone]      PT INITIAL EVALUATION NOTE - [facility] 2-15    Patient Name: Ant Guevara  Date:2021  : 1950  [x]  Patient  Verified  Payor: Aniket Medici / Plan: [place] MEDICARE PART A & B / Product Type: Medicare /    In time:1000  Out time:1100  Total Treatment Time (min): 60  Total Timed Codes (min): 25  1:1 Treatment Time ( [address] only): 25   Visit #: 1     Treatment Area: Primary osteoarthritis of right hip [M16.11]    SUBJECTIVE  Any medication changes, allergies to medications, adverse drug reactions, diagnosis change, or new procedure performed?: [] No    [x] Yes (see summary sheet for update)    Current symptoms/chief complaint: Pt reports he is coming for PT prior to posterior PHILOMENA 21. Pt reports he has had pain for a long time but fall  started to get worse. 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 Also at work he needs to get down on his hands and knees or laying down but is trying to delegate right now. 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 Date of onset/injury: worse Oct / 2021. Aggravated by: moving from sitting to standing. Transferring from sit to stand from the car or Tabitha Ashok / twisting. Eased by:  Standing or sitting. Pain:    1/10 now       Location and description of symptoms: right hip. Right posterior hip, right anterior hip, can radiate down his anterior thigh to his right knee. Diagnostic Tests: [] Lab work [x] X-rays    [] CT [] MRI     [] Other:  Results (per report of the patient): \"the hip is shot. \"     PMHX: Significant for hernia , detached retina 1993. Social/Recreation/Work: Pt works as an  at [employer]. Pt lives at home with his home. Pt also has 3 grand children : 8 yo, 3 yo and 3 yo (all boys). He has been having to sit in a chair to play with them and would like to be able to get onto the floor. He does his own yard work, he has a non self propel and does weeding. Prior level of function: prior to Oct / Nov 2021 he wasn't experiencing as much pain with transfers to quadruped at work or with sit to stand transfer. Patient goal(s): \"get ready for surgery. \"      Objective:    Posture/Observations: iliac crest appear level now, pt reports in the past he thinks that the right hip was longer in the past.  Pt stands in some hip flexion. Gait: Pt ambulates without an AD, pt with flexed posture and increased trunk flexion. ROM:  R hip PROM:  Seated hip ER: 22 deg with mild pain, seated hip IR 10 deg with severe pain (hard end feel). Hip flexion :  100 deg. Strength:  Right hip flexion:  4/5 / pt with right side bending of trunk with right hip flexion. Right knee extension 5/5 without pain  Right knee flexion 5/5 without pain. Functional Biomechanical Screen  Sit to stand:  Pt with very slow transfer from sit to stand - needing increased time once standing to increase hip extension to standing posture. Palpation:  Tenderness to palpation: TTP right adductor longus, right rectus femoris at origin and right iliacus muscles. OBJECTIVE    25 min Therapeutic Exercise:  [x] See flow sheet : HEP per flow sheet, recommended trying exercise bike he has at home. Rationale: increase ROM and increase strength to improve the patients ability to transfer sit to stand and prepare for surgery.            With   [] TE   [] TA   [] neuro   [] other: Patient Education: [x] Review HEP    [] Progressed/Changed HEP based on:   [] positioning   [] body mechanics [] transfers   [] heat/ice application    [] other:        Pain Level (0-10 scale) post treatment: not captured.      Assessment: See POC    Plan: See [id number] Vicki Galvin PT, DPT, OCS    12/14/2021    10:05 AM

## 2021-12-14 NOTE — PROGRESS NOTES
[facility] Physical Therapy  [address] [facility], [address] [place] [address]  Phone: [phone]  Fax: [phone]    Plan of Care/Statement of Necessity for Physical Therapy Services  2-15    Patient name: Kandi Long  : 1950  Provider#: [id number]  Referral source: Lizy Zepeda      Medical/Treatment Diagnosis: Primary osteoarthritis of right hip [M16.11]     Prior Hospitalization: see medical history     Comorbidities: see eval.  Prior Level of Function: see eval.   Medications: Verified on Patient Summary List  Start of Care: see eval.          Onset Date: see eval.       The Plan of Care and following information is based on the information from the initial evaluation. Assessment/ key information: Pt is a 71 yo male with chronic history of right hip pain that recently worsened 2021. Pt reports significant hip OA and has posterior PHILOMENA scheduled for 22. Pt with decreased ROM, flexibility, strength and increased pain and TTP. Pt will benefit from PT prior to surgery .        Evaluation Complexity History MEDIUM  Complexity : 1-2 comorbidities / personal factors will impact the outcome/ POC ; Examination MEDIUM Complexity : 3 Standardized tests and measures addressing body structure, function, activity limitation and / or participation in recreation  ;Presentation LOW Complexity : Stable, uncomplicated  ;Clinical Decision Making MEDIUM Complexity : FOTO score of 26-74  Overall Complexity Rating: LOW     Treatment Plan may include any combination of the following: Therapeutic exercise, Therapeutic activities, Neuromuscular re-education, Physical agent/modality, Gait/balance training, Manual therapy, Patient education and Self Care training   Patient / Family readiness to learn indicated by: asking questions, trying to perform skills and interest  Persons(s) to be included in education: patient (P)  Barriers to Learning/Limitations: None  Patient Goal (s): \"see eval  Patient Self Reported Health Status: good  Rehabilitation Potential: good    Short Term Goals: To be accomplished in 2-3 weeks:   1) Pt independent in HEP from day 1        Long Term Goals: To be accomplished in 4-6 weeks:   1) Pt independent in final HEP prior to her planned surgery. 2) Pt will note 25% decrease in pain with sit to stand transfer. Frequency / Duration: Patient to be seen 1-2 times per week for 4-6 weeks. Patient/ Caregiver education and instruction: self care and exercises    [x]  Plan of care has been reviewed with PTA    Certifircation Period:   12/14/2021 - 03 /13/22      Fran Krueger, PT DPT,OCS 12/14/2021 10:14 AM    _______________________________________________________________________    I certify that the above Therapy Services are being furnished while the patient is under my care. I agree with the treatment plan and certify that this therapy is necessary.     [address] [facility] Signature:____________________  Date:____________Time: _________    ___

## 2021-12-21 ENCOUNTER — HOSPITAL ENCOUNTER (OUTPATIENT)
Dept: PHYSICAL THERAPY | Age: 71
Discharge: HOME | End: 2021-12-21
Attending: PHYSICIAN ASSISTANT
Payer: MEDICARE

## 2021-12-21 LAB
ALBUMIN SERPL-MCNC: 4.6 G/DL (ref 3.7–4.7)
ALP SERPL-CCNC: 65 IU/L (ref 44–121)
ALT SERPL-CCNC: 32 IU/L (ref 0–44)
APPEARANCE UR: CLEAR
AST SERPL-CCNC: 32 IU/L (ref 0–40)
BACTERIA #/AREA URNS HPF: NORMAL /[HPF]
BASOPHILS # BLD AUTO: 0 X10E3/UL (ref 0–0.2)
BASOPHILS NFR BLD AUTO: 1 %
BILIRUB DIRECT SERPL-MCNC: 0.21 MG/DL (ref 0–0.4)
BILIRUB SERPL-MCNC: 0.7 MG/DL (ref 0–1.2)
BILIRUB UR QL STRIP: NEGATIVE
BUN SERPL-MCNC: 20 MG/DL (ref 8–27)
BUN/CREAT SERPL: 19 (ref 10–24)
CALCIUM SERPL-MCNC: 9.6 MG/DL (ref 8.6–10.2)
CASTS URNS QL MICRO: NORMAL /LPF
CHLORIDE SERPL-SCNC: 100 MMOL/L (ref 96–106)
CHOLEST SERPL-MCNC: 159 MG/DL (ref 100–199)
CO2 SERPL-SCNC: 23 MMOL/L (ref 20–29)
COLOR UR: YELLOW
CREAT SERPL-MCNC: 1.07 MG/DL (ref 0.76–1.27)
EOSINOPHIL # BLD AUTO: 0.1 X10E3/UL (ref 0–0.4)
EOSINOPHIL NFR BLD AUTO: 2 %
EPI CELLS #/AREA URNS HPF: NORMAL /HPF (ref 0–10)
ERYTHROCYTE [DISTWIDTH] IN BLOOD BY AUTOMATED COUNT: 12.9 % (ref 11.6–15.4)
EST. AVERAGE GLUCOSE BLD GHB EST-MCNC: 114 MG/DL
GLUCOSE SERPL-MCNC: 93 MG/DL (ref 65–99)
GLUCOSE UR QL: NEGATIVE
HBA1C MFR BLD: 5.6 % (ref 4.8–5.6)
HCT VFR BLD AUTO: 46.3 % (ref 37.5–51)
HDLC SERPL-MCNC: 64 MG/DL
HGB BLD-MCNC: 15.4 G/DL (ref 13–17.7)
HGB UR QL STRIP: NEGATIVE
IMM GRANULOCYTES # BLD AUTO: 0 X10E3/UL (ref 0–0.1)
IMM GRANULOCYTES NFR BLD AUTO: 0 %
KETONES UR QL STRIP: ABNORMAL
LDLC SERPL CALC-MCNC: 82 MG/DL (ref 0–99)
LEUKOCYTE ESTERASE UR QL STRIP: ABNORMAL
LYMPHOCYTES # BLD AUTO: 4.1 X10E3/UL (ref 0.7–3.1)
LYMPHOCYTES NFR BLD AUTO: 57 %
MCH RBC QN AUTO: 30.8 PG (ref 26.6–33)
MCHC RBC AUTO-ENTMCNC: 33.3 G/DL (ref 31.5–35.7)
MCV RBC AUTO: 93 FL (ref 79–97)
MICRO URNS: ABNORMAL
MONOCYTES # BLD AUTO: 0.6 X10E3/UL (ref 0.1–0.9)
MONOCYTES NFR BLD AUTO: 9 %
NEUTROPHILS # BLD AUTO: 2.2 X10E3/UL (ref 1.4–7)
NEUTROPHILS NFR BLD AUTO: 31 %
NITRITE UR QL STRIP: NEGATIVE
PH UR STRIP: 6 [PH] (ref 5–7.5)
PLATELET # BLD AUTO: 222 X10E3/UL (ref 150–450)
POTASSIUM SERPL-SCNC: 4.4 MMOL/L (ref 3.5–5.2)
PROT SERPL-MCNC: 7.2 G/DL (ref 6–8.5)
PROT UR QL STRIP: NEGATIVE
PSA SERPL-MCNC: 1.2 NG/ML (ref 0–4)
RBC # BLD AUTO: 5 X10E6/UL (ref 4.14–5.8)
RBC #/AREA URNS HPF: NORMAL /HPF (ref 0–2)
SODIUM SERPL-SCNC: 141 MMOL/L (ref 134–144)
SP GR UR: 1.02 (ref 1–1.03)
TRIGL SERPL-MCNC: 69 MG/DL (ref 0–149)
TSH SERPL DL<=0.005 MIU/L-ACNC: 2 UIU/ML (ref 0.45–4.5)
UROBILINOGEN UR STRIP-MCNC: 1 MG/DL (ref 0.2–1)
VLDLC SERPL CALC-MCNC: 13 MG/DL (ref 5–40)
WBC # BLD AUTO: 7 X10E3/UL (ref 3.4–10.8)
WBC #/AREA URNS HPF: NORMAL /HPF (ref 0–5)

## 2021-12-21 PROCEDURE — 97140 MANUAL THERAPY 1/> REGIONS: CPT | Performed by: PHYSICAL THERAPIST

## 2021-12-21 PROCEDURE — 97110 THERAPEUTIC EXERCISES: CPT | Performed by: PHYSICAL THERAPIST

## 2021-12-21 NOTE — PROGRESS NOTES
PT DAILY TREATMENT NOTE - [facility] 2-15    Patient Name: Martina Fowler  Date:2021  : 1950  [x]  Patient  Verified  Payor: VA MEDICARE / Plan: VA MEDICARE PART A & B / Product Type: Medicare /    In time:500  Out time:555  Total Treatment Time (min): 55  Total Timed Codes (min): 55  1:1 Treatment Time ([facility] only): 55   Visit #: 2    Treatment Area: Right hip pain [M25.551]    SUBJECTIVE  Pain Level (0-10 scale), subjective functional status/changes: Pt reports 0/10 pain if he is standing/upright. Any medication changes, allergies to medications, adverse drug reactions, diagnosis change, or new procedure performed?: [x] No    [] Yes (see summary sheet for update) SEE ABOVE. OBJECTIVE     Palpation:  TTP right iliacus muscle. - min Modality:      []  Ice     []  Heat       Position/location:   -   Rationale: decrease pain to improve the patients ability to do functional activities   [x] Skin assessment post-treatment:  [x]intact []redness- no adverse reaction    []redness  adverse reaction:      45 min Therapeutic Exercise:  [x] See flow sheet : review of HEP. Progressed per flow sheet. Provided updated HEP. Rationale: increase strength, improve coordination and increase proprioception to improve the patients ability to strengthen, increased flexibility prior to PHILOMENA    10 min Manual Therapy:  STM / TPR to right iliacus muscle. Prone manual quad stretching (2 pillows)   Rationale: decrease pain, increase tissue extensibility and decrease trigger points  to improve the patients ability to strengthen, decrease pain prior to PHILOMENA.             With   [] TE   [] TA   [] neuro   [] other: Patient Education: [x] Review HEP    [] Progressed/Changed HEP based on:   [] positioning   [] body mechanics   [] transfers   [] heat/ice application    [] other:        Pain Level (0-10 scale) post treatment: 0    ASSESSMENT/Changes in Function:   Pt did well with progression, he did have increased right hip pain with standing ham. Stretch but did better with seated ham stretch. Patient will continue to benefit from skilled PT services to modify and progress therapeutic interventions, address functional mobility deficits, address ROM deficits, address strength deficits, analyze and address soft tissue restrictions, assess and modify postural abnormalities and instruct in home and community integration to attain remaining goals. Progress towards goals / Updated goals:  Goals were not re-assessed today.      PLAN      [x]  Continue plan of care    []  Other:_      Elva Salazar, PT DPT, OCS 12/21/2021  6:93 PM       PT License #[id number]

## 2021-12-28 ENCOUNTER — HOSPITAL ENCOUNTER (OUTPATIENT)
Dept: PHYSICAL THERAPY | Age: 71
Discharge: HOME | End: 2021-12-28
Attending: PHYSICIAN ASSISTANT
Payer: MEDICARE

## 2021-12-28 PROCEDURE — 97110 THERAPEUTIC EXERCISES: CPT | Performed by: PHYSICAL THERAPIST

## 2021-12-28 NOTE — PROGRESS NOTES
PT DAILY TREATMENT NOTE - [facility] 2-15    Patient Name: Sudha Goel  Date:2021  : 1950    Patient  Verified  Payor: Raffy Mora / Plan: [place] MEDICARE PART A & B / Product Type: Medicare /    In time:505 Out time: 600   Total Treatment Time (min): 50  Total Timed Codes (min): 50  1:1 Treatment Time ( [address] only): 50   Visit #: 3    Treatment Area: Right hip pain [M25.551]    SUBJECTIVE  Pain Level (0-10 scale), subjective functional status/changes: Pt reports 0/10 pain if he is standing/upright. Pt is wondering if he should bring up his nu step from downstairs or would it be OK to go downstairs each day to do this after surgery. . he is wanting to know how long it is until he can put on shoes/socks the normal way. .. Any medication changes, allergies to medications, adverse drug reactions, diagnosis change, or new procedure performed?: [x] No    [] Yes (see summary sheet for update) SEE ABOVE. OBJECTIVE       - min Modality:      []  Ice     []  Heat       Position/location:   -   Rationale: decrease pain to improve the patients ability to do functional activities   [x] Skin assessment post-treatment:  [x]intact []redness- no adverse reaction    []redness  adverse reaction:      50 min Therapeutic Exercise:  [x] See flow sheet :  Manual quad stretching in prone, with pillow under his hips. Discussed that he should ask surgeon regarding precautions and how long he should follow them. Recommended if he has family to help relocate his nu step that it would be more convenient to put it on the floor he will more often be on. Rationale: increase strength, improve coordination and increase proprioception to improve the patients ability to strengthen, increased flexibility prior to PHILOMENA     min Manual Therapy:         Rationale: decrease pain, increase tissue extensibility and decrease trigger points  to improve the patients ability to strengthen, decrease pain prior to PHILOMENA. With   [] TE   [] TA   [] neuro   [] other: Patient Education: [x] Review HEP    [] Progressed/Changed HEP based on:   [] positioning   [] body mechanics   [] transfers   [] heat/ice application    [] other:        Pain Level (0-10 scale) post treatment: 0    ASSESSMENT/Changes in Function:   Pt needing cues to perform ther. Ex. Correctly at times. Also, answered several questions regarding surgery and post surgery expectations. Patient will continue to benefit from skilled PT services to modify and progress therapeutic interventions, address functional mobility deficits, address ROM deficits, address strength deficits, analyze and address soft tissue restrictions, assess and modify postural abnormalities and instruct in home and community integration to attain remaining goals. Progress towards goals / Updated goals:  Goals were not re-assessed today.      PLAN      [x]  Continue plan of care    []  Other:_      Jenny Talley, PT DPT, [facility] 12/28/2021  7:03 PM       PT License #[id number]

## 2021-12-30 ENCOUNTER — APPOINTMENT (OUTPATIENT)
Dept: PHYSICAL THERAPY | Age: 71
End: 2021-12-30
Attending: PHYSICIAN ASSISTANT
Payer: MEDICARE

## 2021-12-30 ENCOUNTER — HOSPITAL ENCOUNTER (OUTPATIENT)
Dept: PHYSICAL THERAPY | Age: 71
Discharge: HOME | End: 2021-12-30
Attending: PHYSICIAN ASSISTANT
Payer: MEDICARE

## 2021-12-30 PROCEDURE — 97110 THERAPEUTIC EXERCISES: CPT | Performed by: PHYSICAL THERAPIST

## 2021-12-30 PROCEDURE — 97140 MANUAL THERAPY 1/> REGIONS: CPT | Performed by: PHYSICAL THERAPIST

## 2021-12-30 NOTE — PROGRESS NOTES
PT DAILY TREATMENT NOTE - [facility] 2-15    Patient Name: Sara Route  Date:2021  : 1950    Patient  Verified  Payor: Bharati Capone / Plan: VA MEDICARE PART A & B / Product Type: Medicare /    In time:530 Out time: 615   Total Treatment Time (min): 45  Total Timed Codes (min): 45  1:1 Treatment Time ([facility] only): 45   Visit #: 4    Treatment Area: Right hip pain [M25.551]    SUBJECTIVE  Pain Level (0-10 scale), subjective functional status/changes: Pt reports 0/10 pain if he is standing/upright. Pt reports inc. Pain when getting out of his car to come into PT and that's why he didn't bother to sit in a chair in the waiting room. Mimi Gip after standing upright and walking pain decreases. Any medication changes, allergies to medications, adverse drug reactions, diagnosis change, or new procedure performed?: [x] No    [] Yes (see summary sheet for update) SEE ABOVE. OBJECTIVE       - min Modality:      []  Ice     []  Heat       Position/location:   -   Rationale: decrease pain to improve the patients ability to do functional activities   [x] Skin assessment post-treatment:  [x]intact []redness- no adverse reaction    []redness  adverse reaction:      35 min Therapeutic Exercise:  [x] See flow sheet : added standing hip ABD in small pain free range and standing ham curl. Rationale: increase strength, improve coordination and increase proprioception to improve the patients ability to strengthen, increased flexibility prior to PHILOMENA    10 min Manual Therapy:  Manual quad stretching in prone, pillow under his hips. TPR to R TFL muscle. Rationale: decrease pain, increase tissue extensibility and decrease trigger points  to improve the patients ability to strengthen, decrease pain prior to PHILOMENA.             With   [] TE   [] TA   [] neuro   [] other: Patient Education: [x] Review HEP    [] Progressed/Changed HEP based on:   [] positioning   [] body mechanics   [] transfers   [] heat/ice application    [] other:        Pain Level (0-10 scale) post treatment: 0    ASSESSMENT/Changes in Function:   Pt did well throughout session, less cues needed to perform ther. Ex. Correctly. Patient will continue to benefit from skilled PT services to modify and progress therapeutic interventions, address functional mobility deficits, address ROM deficits, address strength deficits, analyze and address soft tissue restrictions, assess and modify postural abnormalities and instruct in home and community integration to attain remaining goals. Progress towards goals / Updated goals:  Goals were not re-assessed today.      PLAN      [x]  Continue plan of care    []  Other:_      Ngozi Vigil, PT DPT, OCS 12/30/2021  7:43 PM       PT License #[id number]

## 2022-01-04 ENCOUNTER — APPOINTMENT (OUTPATIENT)
Dept: PHYSICAL THERAPY | Age: 72
End: 2022-01-04
Attending: PHYSICIAN ASSISTANT
Payer: MEDICARE

## 2022-01-11 ENCOUNTER — TRANSCRIBE ORDER (OUTPATIENT)
Dept: REGISTRATION | Age: 72
End: 2022-01-11

## 2022-01-11 ENCOUNTER — HOSPITAL ENCOUNTER (OUTPATIENT)
Dept: PHYSICAL THERAPY | Age: 72
Discharge: HOME | End: 2022-01-11
Attending: PHYSICIAN ASSISTANT
Payer: MEDICARE

## 2022-01-11 DIAGNOSIS — Z01.812 PRE-PROCEDURE LAB EXAM: Primary | ICD-10-CM

## 2022-01-11 PROCEDURE — 97140 MANUAL THERAPY 1/> REGIONS: CPT | Performed by: PHYSICAL THERAPIST

## 2022-01-11 PROCEDURE — 97110 THERAPEUTIC EXERCISES: CPT | Performed by: PHYSICAL THERAPIST

## 2022-01-11 NOTE — PROGRESS NOTES
PT DAILY TREATMENT NOTE - [facility] 2-15    Patient Name: John García  Date:2022  : 1950    Patient  Verified  Payor: Nupur Quan / Plan: [place] / Product Type: Medicare /    In time:455 Out time:  540    Total Treatment Time (min): 45  Total Timed Codes (min): 45  1:1 Treatment Time ([facility] only): 45   Visit #: 5    Treatment Area: Right hip pain [M25.551]    SUBJECTIVE  Pain Level (0-10 scale), subjective functional status/changes: Pt reports 0/10 pain if he is standing/upright. Pt reports surgery is still scheduled for . He admits he hasn't been as good with doing his home exercises, recently visited his son and new grand daughter (4 mo old) in [place]. Any medication changes, allergies to medications, adverse drug reactions, diagnosis change, or new procedure performed?: [x] No    [] Yes (see summary sheet for update) SEE ABOVE. OBJECTIVE       - min Modality:      []  Ice     []  Heat       Position/location:   -   Rationale: decrease pain to improve the patients ability to do functional activities   [x] Skin assessment post-treatment:  [x]intact []redness- no adverse reaction    []redness  adverse reaction:      35 min Therapeutic Exercise:  [x] See flow sheet :    Rationale: increase strength, improve coordination and increase proprioception to improve the patients ability to strengthen, increased flexibility prior to PHILOMENA    10 min Manual Therapy:  Manual quad stretching in prone, pillow under his hips. Rationale: decrease pain, increase tissue extensibility and decrease trigger points  to improve the patients ability to strengthen, decrease pain prior to PHILOMENA.             With   [] TE   [] TA   [] neuro   [] other: Patient Education: [x] Review HEP    [] Progressed/Changed HEP based on:   [] positioning   [] body mechanics   [] transfers   [] heat/ice application    [] other:        Pain Level (0-10 scale) post treatment: 0    ASSESSMENT/Changes in Function:   Pt needing some cues to perform ther. Ex. Correctly. Patient will continue to benefit from skilled PT services to modify and progress therapeutic interventions, address functional mobility deficits, address ROM deficits, address strength deficits, analyze and address soft tissue restrictions, assess and modify postural abnormalities and instruct in home and community integration to attain remaining goals. Progress towards goals / Updated goals:  Goals were not re-assessed today.      PLAN      [x]  Continue plan of care    []  Other:_      Ceci Tao, PT DPT, OCS 1/11/2022  8:16 PM       PT License #[id number]

## 2022-01-13 ENCOUNTER — HOSPITAL ENCOUNTER (OUTPATIENT)
Dept: PHYSICAL THERAPY | Age: 72
Discharge: HOME | End: 2022-01-13
Attending: PHYSICIAN ASSISTANT
Payer: MEDICARE

## 2022-01-13 PROCEDURE — 97140 MANUAL THERAPY 1/> REGIONS: CPT | Performed by: PHYSICAL THERAPIST

## 2022-01-13 PROCEDURE — 97110 THERAPEUTIC EXERCISES: CPT | Performed by: PHYSICAL THERAPIST

## 2022-01-13 NOTE — PROGRESS NOTES
PT DAILY TREATMENT NOTE - [facility] 2-15    Patient Name: Francoise Gutiérrez  Date:2022  : 1950    Patient  Verified  Payor: Kayy Barrientos / Plan: [place] PART [place] & B / Product Type: Medicare /    In time:500 Out time:  550    Total Treatment Time (min): 45  Total Timed Codes (min): 45  1:1 Treatment Time [facility] only):45    Visit #: 6    Treatment Area: Right hip pain [M25.551]    SUBJECTIVE  Pain Level (0-10 scale), subjective functional status/changes: Pt reports 0/10 pain if he is standing/upright. Pt reports he has somewhat of a cold (pt denies a fever). Any medication changes, allergies to medications, adverse drug reactions, diagnosis change, or new procedure performed?: [x] No    [] Yes (see summary sheet for update) SEE ABOVE. OBJECTIVE       - min Modality:      []  Ice     []  Heat       Position/location:   -   Rationale: decrease pain to improve the patients ability to do functional activities   [x] Skin assessment post-treatment:  [x]intact []redness- no adverse reaction    []redness  adverse reaction:      35 min Therapeutic Exercise:  [x] See flow sheet :    Rationale: increase strength, improve coordination and increase proprioception to improve the patients ability to strengthen, increased flexibility prior to PHILOMENA    10 min Manual Therapy:  Manual quad stretching in prone, pillow under his hips. Rationale: decrease pain, increase tissue extensibility and decrease trigger points  to improve the patients ability to strengthen, decrease pain prior to PHILOMENA. With   [] TE   [] TA   [] neuro   [] other: Patient Education: [x] Review HEP    [] Progressed/Changed HEP based on:   [] positioning   [] body mechanics   [] transfers   [] heat/ice application    [] other:        Pain Level (0-10 scale) post treatment: 0    ASSESSMENT/Changes in Function:   Pt needed cues to perform ther. Ex. Correctly at times.     Patient will continue to benefit from skilled PT services to modify and progress therapeutic interventions, address functional mobility deficits, address ROM deficits, address strength deficits, analyze and address soft tissue restrictions, assess and modify postural abnormalities and instruct in home and community integration to attain remaining goals. Progress towards goals / Updated goals:  Goals were not re-assessed today.      PLAN      [x]  Continue plan of care    []  Other:_      Farhan De La O, PT DPT, OCS 1/13/2022  3:14 PM       PT License #[id number]

## 2022-01-14 DIAGNOSIS — R93.1 ELEVATED CORONARY ARTERY CALCIUM SCORE: ICD-10-CM

## 2022-01-14 DIAGNOSIS — E78.2 MIXED HYPERLIPIDEMIA: ICD-10-CM

## 2022-01-17 RX ORDER — ATORVASTATIN CALCIUM 20 MG/1
20 TABLET, FILM COATED ORAL DAILY
Qty: 90 TABLET | Refills: 3 | Status: SHIPPED | OUTPATIENT
Start: 2022-01-17

## 2022-01-18 ENCOUNTER — HOSPITAL ENCOUNTER (OUTPATIENT)
Dept: PHYSICAL THERAPY | Age: 72
Discharge: HOME | End: 2022-01-18
Attending: PHYSICIAN ASSISTANT
Payer: MEDICARE

## 2022-01-18 ENCOUNTER — HOSPITAL ENCOUNTER (OUTPATIENT)
Dept: PREADMISSION TESTING | Age: 72
Discharge: HOME | End: 2022-01-18
Payer: MEDICARE

## 2022-01-18 VITALS
BODY MASS INDEX: 24.11 KG/M2 | DIASTOLIC BLOOD PRESSURE: 85 MMHG | WEIGHT: 187.83 LBS | TEMPERATURE: 98 F | HEIGHT: 74 IN | SYSTOLIC BLOOD PRESSURE: 146 MMHG | HEART RATE: 73 BPM

## 2022-01-18 LAB
ABO + RH BLD: NORMAL
ATRIAL RATE: 70 BPM
BLOOD GROUP ANTIBODIES SERPL: NORMAL
CALCULATED P AXIS, ECG09: 50 DEGREES
CALCULATED R AXIS, ECG10: 61 DEGREES
CALCULATED T AXIS, ECG11: 69 DEGREES
DIAGNOSIS, 93000: NORMAL
INR PPP: 0.9 (ref 0.9–1.1)
P-R INTERVAL, ECG05: 174 MS
PROTHROMBIN TIME: 9.9 SEC (ref 9–11.1)
Q-T INTERVAL, ECG07: 380 MS
QRS DURATION, ECG06: 84 MS
QTC CALCULATION (BEZET), ECG08: 410 MS
SPECIMEN EXP DATE BLD: NORMAL
VENTRICULAR RATE, ECG03: 70 BPM

## 2022-01-18 PROCEDURE — 97140 MANUAL THERAPY 1/> REGIONS: CPT | Performed by: PHYSICAL THERAPIST

## 2022-01-18 PROCEDURE — 86900 BLOOD TYPING SEROLOGIC ABO: CPT

## 2022-01-18 PROCEDURE — 97110 THERAPEUTIC EXERCISES: CPT | Performed by: PHYSICAL THERAPIST

## 2022-01-18 PROCEDURE — 93005 ELECTROCARDIOGRAM TRACING: CPT

## 2022-01-18 PROCEDURE — 85610 PROTHROMBIN TIME: CPT

## 2022-01-18 RX ORDER — LORATADINE 10 MG/1
10 TABLET ORAL AS NEEDED
COMMUNITY
End: 2022-01-19 | Stop reason: ALTCHOICE

## 2022-01-18 RX ORDER — FLUTICASONE PROPIONATE 50 MCG
2 SPRAY, SUSPENSION (ML) NASAL AS NEEDED
COMMUNITY
End: 2022-01-19 | Stop reason: ALTCHOICE

## 2022-01-18 NOTE — PERIOP NOTES
[facility] INSTRUCTIONS    Surgery Date:   01/27/2022    [facility] staff will call you between 4 PM- 8 PM the day before surgery with your arrival time. If your surgery is on a Monday, we will call you the preceding Friday. Please call [phone] after 8 PM if you did not receive your arrival time. 1. Please report to [facility] Patient Access/Admitting on the 1st floor. Bring your insurance card, photo identification, and any copayment ( if applicable). 2. If you are going home the same day of your surgery, you must have a responsible adult to drive you home. You need to have a responsible adult to stay with you the first 24 hours after surgery and you should not drive a car for 24 hours following your surgery. 3. Do NOT eat any solid foods after midnight the night before surgery including candy, mint or gum. You may drink clear liquids from midnight until 1 hour prior to your arrival. You may drink up to 12 ounces at one time every 4 hours. Please note special instructions, if applicable, below for medications. 4. Do NOT drink alcohol or smoke 24 hours before surgery. STOP smoking for 14 days prior as it helps with breathing and healing after surgery. 5. If you are being admitted to the hospital, please leave personal belongings/luggage in your car until you have an assigned hospital room number. 6. Please wear comfortable clothes. Wear your glasses instead of contacts. We ask that all money, jewelry and valuables be left at home. Wear no make up, particularly mascara, the day of surgery. 7.  All body piercings, rings, and jewelry need to be removed and left at home. Please remove any nail polish or artifical nails from your fingernails. Please wear your hair loose or down. Please no pony-tails, buns, or any metal hair accessories. If you shower the morning of surgery, please do not apply any lotions or powders afterwards. You may wear deodorant, unless having breast surgery.   Do not shave any body area within 24 hours of your surgery. 8. Please follow all instructions to avoid any potential surgical cancellation. 9. Should your physical condition change, (i.e. fever, cold, flu, etc.) please notify your surgeon as soon as possible. 10. It is important to be on time. If a situation occurs where you may be delayed, please call:  [phone] / [phone] on the day of surgery. 11. The Preadmission Testing staff can be reached at [phone]. 12. Special instructions: N/A      Current Outpatient Medications   Medication Sig    multivit-min/ferrous fumarate (MULTI VITAMIN PO) Take 1 Tablet by mouth every morning.  loratadine (Claritin) 10 mg tablet Take 10 mg by mouth as needed for Allergies.  fluticasone propionate (Flonase Allergy Relief) 50 mcg/actuation nasal spray 2 Sprays by Both Nostrils route as needed.  atorvastatin (LIPITOR) 20 mg tablet Take 1 Tablet by mouth daily. (Patient taking differently: Take 20 mg by mouth every morning.)     No current facility-administered medications for this encounter. 1. YOU MUST ONLY TAKE THESE MEDICATIONS THE MORNING OF SURGERY WITH A SIP OF WATER: ATORVASTATIN  2. MEDICATIONS TO TAKE THE MORNING OF SURGERY ONLY IF NEEDED: N/A  3. HOLD these medications BEFORE Surgery: Parvin Calvo  4. Ask your surgeon/prescribing physician about when/if to STOP taking these medications: N/A  5. Stop all vitamins, herbal medicines and Aspirin containing products 7 days prior to surgery. Stop any non-steroidal anti-inflammatory drugs (i.e. Ibuprofen, Naproxen, Advil, Aleve) 3 days before surgery. You may take Tylenol. 6. If you are currently taking Plavix, Coumadin,or any other blood-thinning/anticoagulant medication contact your prescribing physician for instructions. Eating and Drinking Before Surgery     You may eat a regular dinner at the usual time on the day before your surgery.    Do NOT eat any solid foods after midnight unless your arrival time at the hospital is 3pm or later.  You may drink clear liquids only from 12 midnight until 1 hours prior to your arrival time at the hospital on the day of your surgery. Do NOT drink alcohol.  Clear liquids include:  o Water  o Fruit juices without pulp( i.e. apple juice)  o Carbonated beverages  o Black coffee (no cream/milk)  o Tea (no cream/milk)  o Gatorade   You may drink up to 12-16 ounces at one time every 4 hours between the hours of midnight and 1 hour before your arrival time at the hospital. Example- if your arrival time at the hospital is 6am, you may drink 12-16 ounces of clear liquids no later than 5am.   If your arrival time at the hospital is 3pm or later, you may eat a light breakfast before 8am.   A light breakfast includes:  o Toast or bagel (no butter)  o Black coffee (no cream/milk)  o Tea (no cream/milk)  o Fruit juices without pulp ( i.e. apple juice)  o Do NOT eat meat, eggs, vegetables or fruit   If you have any questions, please contact your surgeon's office. Preventing Infections Before and After  Your Surgery    IMPORTANT INSTRUCTIONS    Please read and follow these instructions carefully. If you are unable to comply with the below instructions your procedure will be cancelled. You play an important role in your health and preparation for surgery. To reduce the germs on your skin you will need to shower with CHG soap (Chorhexidine gluconate 4%) two times before surgery. CHG soap (Hibiclens, Hex-A-Clens or store brand)   CHG soap will be provided at your Preadmission Testing (PAT) appointment.  If you do not have a PAT appointment before surgery, you may arrange to  CHG soap from our office or purchase CHG soap at a pharmacy, grocery or department store.  You need to purchase TWO 4 ounce bottles to use for your 2 showers. Steps to follow:  1.  Wash your hair with your normal shampoo and your body with regular soap and rinse well to remove shampoo and soap from your skin. 2. Wet a clean washcloth and turn off the shower. 3. Put CHG soap on washcloth and apply to your entire body from the neck down. Do not use on your head, face or private parts(genitals). Do not use CHG soap on open sores, wounds or areas of skin irritation. 4. Wash you body gently for 5 minutes. Do not wash your skin too hard. This soap does not create lather. Pay special attention to your underarms and from your belly button to your feet. 5. Turn the shower back on and rinse well to get CHG soap off your body. 6. Pat your skin dry with a clean, dry towel. Do not apply lotions or moisturizer. 7. Put on clean clothes and sleep on fresh bed sheets and do not allow pets to sleep with you. Shower with CHG soap 2 times before your surgery   The evening before your surgery   The morning of your surgery      Tips to help prevent infections after your surgery:  1. Protect your surgical wound from germs:  ? Hand washing is the most important thing you and your caregivers can do to prevent infections. ? Keep your bandage clean and dry! ? Do not touch your surgical wound. 2. Use clean, freshly washed towels and washcloths every time you shower; do not share bath linens with others. 3. Until your surgical wound is healed, wear clothing and sleep on bed linens each day that are clean and freshly washed. 4. Do not allow pets to sleep in your bed with you or touch your surgical wound. 5. Do not smoke  smoking delays wound healing. This may be a good time to stop smoking. 6. If you have diabetes, it is important for you to manage your blood sugar levels properly before your surgery as well as after your surgery. Poorly managed blood sugar levels slow down wound healing and prevent you from healing completely.         [employer]   Instructions for Pre-Surgery COVID-19 Testing     Across our ministry we have established standard guidelines to ensure the health and safety of our patients, residents and associates as we resume elective services for patients. All patients presenting for surgery are required to have a COVID-19 test result within 96 hours of their scheduled surgery. [facility] is providing this test free of charge to the patient. Instructions for COVID-19 Testing:     Patients will receive a call from Pre-Admission Testing 4-5 days prior to surgery to schedule a date and time to come to the [address] [place] [address] Drive for their COVID-19 test   Patients are advised to self-quarantine after testing until their scheduled surgery   Once on site, patients will be registered and receive COVID test in their vehicle   If a patient is scheduled for normal Pre Admission Testing 96 hours from date of surgery, the patient will still have their COVID test done at the [address] [place] [address] located at [address] [place] [address] Positive results will be shared with the surgeon and anesthesiologist and may result in cancellation of the elective procedure    Testing Hours and Location:   Address:  [address] [place] [address] Up Pre Admission [facility] in the Discharge Lot on [facility] (Map Attached)  [address], [address]   Hours: Monday- Friday 7a-3p    PAT Phone Number: [phone]              Patient Information Regarding COVID Restrictions    Patients are advised to self-quarantine after COVID testing up to the day of the scheduled procedure. Day of Procedure     Please park in the parking deck or any designated visitor parking lot.  Enter the facility through the Main Entrance of the hospital.   A temperature check and appropriate symptom/exposure screening will be done prior to entry to the facility.  On the day of surgery, please provide the cell phone number of the person who will be waiting for you to the Patient Access representative at the time of registration.    Please wear a mask on the day of your procedure.  We are now allowing one designated visitor per stay. Pediatric patients may have 2 designated visitors. This one person may come in with you on the day of your procedure.  No visitors under the age of 13.  The designated visitor must also wear a mask.  Once your procedure and the immediate recovery period is completed, a nurse in the recovery area will contact your designated visitor to inform them of your room number or to otherwise review other pertinent information regarding your care.  Social distancing practices are to be adhered to in waiting areas and the cafeteria. The patient was contacted  in person. He verbalized understanding of all instructions does not  need reinforcement.

## 2022-01-18 NOTE — PROGRESS NOTES
PT DAILY TREATMENT NOTE - [facility] 2-15    Patient Name: Herbie Drop  Date:2022  : 1950    Patient  Verified  Payor: Tray Omer / Plan: [place] MEDICARE PART A & B / Product Type: Medicare /    In time:455 Out time:  535     Total Treatment Time (min): 40  Total Timed Codes (min): 40  1:1 Treatment Time [facility] only):40    Visit #: 7    Treatment Area: Right hip pain [M25.551]    SUBJECTIVE  Pain Level (0-10 scale), subjective functional status/changes: Pt reports 0/10 pain if he is standing/upright. He felt some soreness on his recumbent stepper and thought if he did 30 min. On it, it might work it out but he thinks he overdid it as he had more pain. He signed pre-op paperwork and had EKG yesterday which went fine. Any medication changes, allergies to medications, adverse drug reactions, diagnosis change, or new procedure performed?: [x] No    [] Yes (see summary sheet for update) SEE ABOVE. OBJECTIVE       - min Modality:      []  Ice     []  Heat       Position/location:   -   Rationale: decrease pain to improve the patients ability to do functional activities   [x] Skin assessment post-treatment:  [x]intact []redness- no adverse reaction    []redness  adverse reaction:      30 min Therapeutic Exercise:  [x] See flow sheet :    Rationale: increase strength, improve coordination and increase proprioception to improve the patients ability to strengthen, increased flexibility prior to PHILOMENA    10 min Manual Therapy:  Manual quad stretching in prone, pillow under his hips. Manual right hamstring stretch. Rationale: decrease pain, increase tissue extensibility and decrease trigger points  to improve the patients ability to strengthen, decrease pain prior to PHILOMENA.             With   [] TE   [] TA   [] neuro   [] other: Patient Education: [x] Review HEP    [] Progressed/Changed HEP based on:   [] positioning   [] body mechanics   [] transfers   [] heat/ice application    [] other: Pain Level (0-10 scale) post treatment: 0    ASSESSMENT/Changes in Function:   Pt needed cues to perform ther. Ex. Correctly at times. Patient will continue to benefit from skilled PT services to modify and progress therapeutic interventions, address functional mobility deficits, address ROM deficits, address strength deficits, analyze and address soft tissue restrictions, assess and modify postural abnormalities and instruct in home and community integration to attain remaining goals. Progress towards goals / Updated goals:  Goals were not re-assessed today.      PLAN      [x]  Continue plan of care    []  Other:_      Xander Ocampo, PT DPT, OCS 1/18/2022  7:97 PM       PT License #[id number]

## 2022-01-19 ENCOUNTER — OFFICE VISIT (OUTPATIENT)
Dept: INTERNAL MEDICINE CLINIC | Age: 72
End: 2022-01-19
Payer: MEDICARE

## 2022-01-19 VITALS
SYSTOLIC BLOOD PRESSURE: 148 MMHG | BODY MASS INDEX: 24.13 KG/M2 | DIASTOLIC BLOOD PRESSURE: 84 MMHG | HEART RATE: 99 BPM | RESPIRATION RATE: 16 BRPM | HEIGHT: 74 IN | OXYGEN SATURATION: 99 % | TEMPERATURE: 97.5 F | WEIGHT: 188 LBS

## 2022-01-19 DIAGNOSIS — R93.1 ELEVATED CORONARY ARTERY CALCIUM SCORE: ICD-10-CM

## 2022-01-19 DIAGNOSIS — M16.11 PRIMARY LOCALIZED OSTEOARTHRITIS OF RIGHT HIP: Primary | ICD-10-CM

## 2022-01-19 DIAGNOSIS — E78.2 MIXED HYPERLIPIDEMIA: ICD-10-CM

## 2022-01-19 DIAGNOSIS — R73.01 IFG (IMPAIRED FASTING GLUCOSE): ICD-10-CM

## 2022-01-19 LAB
BACTERIA SPEC CULT: NORMAL
BACTERIA SPEC CULT: NORMAL
SERVICE CMNT-IMP: NORMAL

## 2022-01-19 PROCEDURE — G8536 NO DOC ELDER MAL SCRN: HCPCS | Performed by: INTERNAL MEDICINE

## 2022-01-19 PROCEDURE — G8427 DOCREV CUR MEDS BY ELIG CLIN: HCPCS | Performed by: INTERNAL MEDICINE

## 2022-01-19 PROCEDURE — 1101F PT FALLS ASSESS-DOCD LE1/YR: CPT | Performed by: INTERNAL MEDICINE

## 2022-01-19 PROCEDURE — G0463 HOSPITAL OUTPT CLINIC VISIT: HCPCS | Performed by: INTERNAL MEDICINE

## 2022-01-19 PROCEDURE — 99215 OFFICE O/P EST HI 40 MIN: CPT | Performed by: INTERNAL MEDICINE

## 2022-01-19 PROCEDURE — 3017F COLORECTAL CA SCREEN DOC REV: CPT | Performed by: INTERNAL MEDICINE

## 2022-01-19 PROCEDURE — G8420 CALC BMI NORM PARAMETERS: HCPCS | Performed by: INTERNAL MEDICINE

## 2022-01-19 PROCEDURE — G8510 SCR DEP NEG, NO PLAN REQD: HCPCS | Performed by: INTERNAL MEDICINE

## 2022-01-19 NOTE — PERIOP NOTES
PAT Nurse Practitioner   Pre-Operative Chart Review/Assessment:-ORTHOPEDIC                Patient Name:  aSra Alcocer                                                           Age:   70 y.o.    :  1950     Today's Date:  2022     Date of PAT:   2022      Date of Surgery:    2022 RESCHEDULED to 2022 d/t +COVID swab     Procedure(s):  Right Total Hip Arthroplasty     Surgeon:   Dr. Ani Acosta                       PLAN:      1)  Medical Clearance:  Dr. Nithya Culp      2)  Cardiac Clearance:  EKG and METs reviewed. No further cardiac evaluation requested. 3)  Diabetic Treatment Consult:  Not indicated. A1c-5.6      4)  Sleep Apnea evaluation:   Not indicated.  DAE Score 2.       5) Treatment for MRSA/Staph Aureus:  Neg      6) Additional Concerns:  [place] R side                Vital Signs:         Vitals:    22 0913   BP: (!) 146/85   Pulse: 73   Temp: 98 °F (36.7 °C)   Weight: 85.2 kg (187 lb 13.3 oz)   Height: 6' 2\" (1.88 m)            ____________________________________________  PAST MEDICAL HISTORY  Past Medical History:   Diagnosis Date    Allergic rhinitis, cause unspecified     Arrhythmia     heart murmur    Arthritis     Cancer ([address]     PRE-CANCER SPOTS ON SKIN    Cataract     BILATERAL WITH LENS IMPLANTS    Clavicle fracture     10 YO    Detached retina, bilateral     Hemorrhoids     High cholesterol     Mitral valve prolapse     Wrist fracture     RIGHT WRIST @ 9 YO    Wrist fracture     LEFT WRIST, 10 OR 12 YO      ____________________________________________  PAST SURGICAL HISTORY  Past Surgical History:   Procedure Laterality Date    ENDOSCOPY, COLON, DIAGNOSTIC      ,  , due 22    HX CATARACT REMOVAL Bilateral     HX HERNIA REPAIR Bilateral 2011    BIH    HX ORTHOPAEDIC Left 10/06/2021    NODULE ON WRIST, SURGEON DR. Jennifer Rodriguez HEPPER    HX RETINAL DETACHMENT REPAIR Bilateral     HX WISDOM TEETH EXTRACTION ____________________________________________  HOME MEDICATIONS  Current Outpatient Medications   Medication Sig    multivit-min/ferrous fumarate (MULTI VITAMIN PO) Take 1 Tablet by mouth every morning. (Patient not taking: Reported on 1/19/2022)    atorvastatin (LIPITOR) 20 mg tablet Take 1 Tablet by mouth daily. (Patient taking differently: Take 20 mg by mouth every morning.)     No current facility-administered medications for this encounter.      ____________________________________________  ALLERGIES  Allergies   Allergen Reactions    Adhesive Rash     ecg adhesive    Hay Fever And Allergy Relief Unknown (comments)      ____________________________________________  SOCIAL HISTORY  Social History     Tobacco Use    Smoking status: Never Smoker    Smokeless tobacco: Never Used   Substance Use Topics    Alcohol use: Yes     Alcohol/week: 2.0 standard drinks     Types: 1 Glasses of wine, 1 Cans of beer per week     Comment: occasionally      ____________________________________________   Internal Administration   First Dose COVID-19, Moderna, Primary or Immunocompromised Series, MRNA, PF, 100mcg/0.5mL  01/28/2021   Second Dose COVID-19, Moderna, Primary or Immunocompromised Series, MRNA, PF, 100mcg/0.5mL  02/25/2021      Last COVID Lab SARS-CoV-2 ( )   Date Value   01/24/2022 Detected (A)          Labs:     Hospital Outpatient Visit on 01/18/2022   Component Date Value Ref Range Status    Crossmatch Expiration 01/18/2022 01/30/2022,2359   Final    ABO/Rh(D) 01/18/2022 O POSITIVE   Final    Antibody screen 01/18/2022 NEG   Final    INR 01/18/2022 0.9  0.9 - 1.1   Final    A single therapeutic range for Vit K antagonists may not be optimal for all indications - see June, 2008 issue of Chest, American College of Chest Physicians Evidence-Based Clinical Practice Guidelines, 8th Edition.     Prothrombin time 01/18/2022 9.9  9.0 - 11.1 sec Final    Ventricular Rate 01/18/2022 70  BPM Final    Atrial Rate 01/18/2022 70  BPM Final    P-R Interval 01/18/2022 174  ms Final    QRS Duration 01/18/2022 84  ms Final    Q-T Interval 01/18/2022 380  ms Final    QTC Calculation (Bezet) 01/18/2022 410  ms Final    Calculated P Axis 01/18/2022 50  degrees Final    Calculated R Axis 01/18/2022 61  degrees Final    Calculated T Axis 01/18/2022 69  degrees Final    Diagnosis 01/18/2022    Final                    Value:Normal sinus rhythm    When compared with ECG of 11-JUL-2011 12:43,  No significant change was found  Confirmed by Sophia Hair M.D., [place] ([id number]) on 1/18/2022 7:48:31 PM      Special Requests: 01/18/2022 NO SPECIAL REQUESTS    Final    Culture result: 01/18/2022 MRSA NOT PRESENT    Final           Office Visit on 12/13/2021   Component Date Value Ref Range Status    WBC 12/20/2021 7.0  3.4 - 10.8 x10E3/uL Final    RBC 12/20/2021 5.00  4.14 - 5.80 x10E6/uL Final    HGB 12/20/2021 15.4  13.0 - 17.7 g/dL Final    HCT 12/20/2021 46.3  37.5 - 51.0 % Final    MCV 12/20/2021 93  79 - 97 fL Final    MCH 12/20/2021 30.8  26.6 - 33.0 pg Final    MCHC 12/20/2021 33.3  31.5 - 35.7 g/dL Final    RDW 12/20/2021 12.9  11.6 - 15.4 % Final    PLATELET 96/32/9343 454  150 - 450 x10E3/uL Final    NEUTROPHILS 12/20/2021 31  Not Estab. % Final    Lymphocytes 12/20/2021 57  Not Estab. % Final    MONOCYTES 12/20/2021 9  Not Estab. % Final    EOSINOPHILS 12/20/2021 2  Not Estab. % Final    BASOPHILS 12/20/2021 1  Not Estab. % Final    ABS. NEUTROPHILS 12/20/2021 2.2  1.4 - 7.0 x10E3/uL Final    Abs Lymphocytes 12/20/2021 4.1* 0.7 - 3.1 x10E3/uL Final    ABS. MONOCYTES 12/20/2021 0.6  0.1 - 0.9 x10E3/uL Final    ABS. EOSINOPHILS 12/20/2021 0.1  0.0 - 0.4 x10E3/uL Final    ABS. BASOPHILS 12/20/2021 0.0  0.0 - 0.2 x10E3/uL Final    IMMATURE GRANULOCYTES 12/20/2021 0  Not Estab. % Final    ABS. IMM. GRANS.  12/20/2021 0.0  0.0 - 0.1 x10E3/uL Final    Glucose 12/20/2021 93  65 - 99 mg/dL Final    BUN 12/20/2021 20  8 - 27 mg/dL Final    Creatinine 12/20/2021 1.07  0.76 - 1.27 mg/dL Final    GFR est non-AA 12/20/2021 69  >59 mL/min/1.73 Final    GFR est AA 12/20/2021 80  >59 mL/min/1.73 Final    Comment: **In accordance with recommendations from the NKF-ASN Task force,**    [facility] is in the process of updating its eGFR calculation to the    2021 CKD-EPI creatinine equation that estimates kidney function    without a race variable.  BUN/Creatinine ratio 12/20/2021 19  10 - 24 Final    Sodium 12/20/2021 141  134 - 144 mmol/L Final    Potassium 12/20/2021 4.4  3.5 - 5.2 mmol/L Final    Chloride 12/20/2021 100  96 - 106 mmol/L Final    CO2 12/20/2021 23  20 - 29 mmol/L Final    Calcium 12/20/2021 9.6  8.6 - 10.2 mg/dL Final    Cholesterol, total 12/20/2021 159  100 - 199 mg/dL Final    Triglyceride 12/20/2021 69  0 - 149 mg/dL Final    HDL Cholesterol 12/20/2021 64  >39 mg/dL Final    VLDL, calculated 12/20/2021 13  5 - 40 mg/dL Final    LDL, calculated 12/20/2021 82  0 - 99 mg/dL Final    Protein, total 12/20/2021 7.2  6.0 - 8.5 g/dL Final    Albumin 12/20/2021 4.6  3.7 - 4.7 g/dL Final    Bilirubin, total 12/20/2021 0.7  0.0 - 1.2 mg/dL Final    Bilirubin, direct 12/20/2021 0.21  0.00 - 0.40 mg/dL Final    Alk.  phosphatase 12/20/2021 65  44 - 121 IU/L Final                  **Please note reference interval change**    AST (SGOT) 12/20/2021 32  0 - 40 IU/L Final    ALT (SGPT) 12/20/2021 32  0 - 44 IU/L Final    TSH 12/20/2021 2.000  0.450 - 4.500 uIU/mL Final    Specific Gravity 12/20/2021 1.023  1.005 - 1.030 Final    pH (UA) 12/20/2021 6.0  5.0 - 7.5 Final    Color 12/20/2021 Yellow  Yellow Final    Appearance 12/20/2021 Clear  Clear Final    Leukocyte Esterase 12/20/2021 Trace* Negative Final    Protein 12/20/2021 Negative  Negative/Trace Final    Glucose 12/20/2021 Negative  Negative Final    Ketone 12/20/2021 Trace* Negative Final    Blood 12/20/2021 Negative  Negative Final    Bilirubin 12/20/2021 Negative  Negative Final    Urobilinogen 12/20/2021 1.0  0.2 - 1.0 mg/dL Final    Nitrites 12/20/2021 Negative  Negative Final    Microscopic Examination 12/20/2021 See additional order   Final    Microscopic was indicated and was performed.  Prostate Specific Ag 12/20/2021 1.2  0.0 - 4.0 ng/mL Final    Comment: Roche ECLIA methodology. According to the American Urological Association, Serum PSA should  decrease and remain at undetectable levels after radical  prostatectomy. The AUA defines biochemical recurrence as an initial  PSA value 0.2 ng/mL or greater followed by a subsequent confirmatory  PSA value 0.2 ng/mL or greater. Values obtained with different assay methods or kits cannot be used  interchangeably. Results cannot be interpreted as absolute evidence  of the presence or absence of malignant disease.  Hemoglobin A1c 12/20/2021 5.6  4.8 - 5.6 % Final    Comment:          Prediabetes: 5.7 - 6.4           Diabetes: >6.4           Glycemic control for adults with diabetes: <7.0      Estimated average glucose 12/20/2021 114  mg/dL Final    WBC 12/20/2021 None seen  0 - 5 /hpf Final    RBC 12/20/2021 0-2  0 - 2 /hpf Final    Epithelial cells 12/20/2021 None seen  0 - 10 /hpf Final    Casts 12/20/2021 None seen  None seen /lpf Final    Bacteria 12/20/2021 None seen  None seen/Few Final       Skin:     Denies open wounds, cuts, sores, rashes or other areas of concern in PAT assessment.           Silvia Stallings, CATHRYN

## 2022-01-19 NOTE — PROGRESS NOTES
HISTORY OF PRESENT ILLNESS  David Quevedo is a 70 y.o. male. HPI  Assessment:  Ashley Quevedo is seen today for follow up of hip arthritis and to have a medical assessment prior to hip replacement. Also reviewed other routine labs. 1. Hip arthritis. He has had decreased functional status and chronic pain with right osteoarthritis in the hip joint. Surgery is scheduled for 01/27/22 with Dr. Jimmy Gonzalez. 2. Hyperlipidemia, abnormal coronary artery calcium score. Up to date with labs, which have been fine. He is completely asymptomatic. He has never had any cardiac symptoms. He remains symptom free without symptoms of chest pain or CHF. He has had no significant infection symptoms, hospitalizations or ER visits. I have reviewed the available preoperative studies and they appear unremarkable. He is at low risk for medical complications given clinical stability, as well as based on the above assessment. Recommendations:  1. Call prn if I may help with any postoperative medical problems or questions. 2. He will follow up with me on a prn basis, otherwise as previously scheduled.     Discontinue vitamins prior to surgery    Past Medical History:   Diagnosis Date    Allergic rhinitis, cause unspecified     Arrhythmia     heart murmur    Arthritis     Cancer ([address]     PRE-CANCER SPOTS ON SKIN    Cataract     BILATERAL WITH LENS IMPLANTS    Clavicle fracture     10 YO    Detached retina, bilateral 1990    Hemorrhoids     High cholesterol     Mitral valve prolapse     Wrist fracture     RIGHT WRIST @ 7 YO    Wrist fracture     LEFT WRIST, 10 OR 10 YO     Past Surgical History:   Procedure Laterality Date    ENDOSCOPY, COLON, DIAGNOSTIC      2000, 2012 , due 22    HX CATARACT REMOVAL Bilateral 1990's    HX HERNIA REPAIR Bilateral 07/2011    BIH    HX ORTHOPAEDIC Left 10/06/2021    NODULE ON WRIST, SURGEON DR. Kunz Seats HEPPER    HX RETINAL DETACHMENT REPAIR Bilateral 1990's    HX WISDOM TEETH EXTRACTION       Current Outpatient Medications   Medication Sig    atorvastatin (LIPITOR) 20 mg tablet Take 1 Tablet by mouth daily. (Patient taking differently: Take 20 mg by mouth every morning.)    multivit-min/ferrous fumarate (MULTI VITAMIN PO) Take 1 Tablet by mouth every morning. (Patient not taking: Reported on 1/19/2022)     No current facility-administered medications for this visit. Allergies   Allergen Reactions    Adhesive Rash     ecg adhesive    Hay Fever And Allergy Relief Unknown (comments)       Review of Systems   Constitutional: Negative for chills, fever and weight loss. Respiratory: Negative. Cardiovascular: Negative for chest pain, palpitations, leg swelling and PND. Musculoskeletal: Positive for joint pain. Negative for myalgias. Neurological: Negative for focal weakness. Physical Exam  Vitals and nursing note reviewed. Constitutional:       General: He is not in acute distress. Neck:      Vascular: No carotid bruit. Cardiovascular:      Rate and Rhythm: Normal rate and regular rhythm. Heart sounds: No murmur heard. No friction rub. No gallop. Pulmonary:      Effort: Pulmonary effort is normal. No respiratory distress. Breath sounds: Normal breath sounds. Musculoskeletal:      Right lower leg: No edema. Left lower leg: No edema. Comments: Full ortho per Dr Binta Craig   Skin:     General: Skin is warm and dry. Neurological:      Mental Status: He is alert. Psychiatric:         Behavior: Behavior normal.         ASSESSMENT and PLAN  Diagnoses and all orders for this visit:    1. Primary localized osteoarthritis of right hip    2. Elevated coronary artery calcium score    3. Mixed hyperlipidemia    4.  IFG (impaired fasting glucose)

## 2022-01-19 NOTE — PROGRESS NOTES
Verified name and birth date for privacy precautions. Chart reviewed in preparation for today's visit. Chief Complaint   Patient presents with    Pre-op Exam     right hip replacemnt  01/27/22          Health Maintenance Due   Topic    Shingrix Vaccine Age 50> (1 of 2)         Wt Readings from Last 3 Encounters:   01/19/22 188 lb (85.3 kg)   01/18/22 187 lb 13.3 oz (85.2 kg)   12/13/21 184 lb 9.6 oz (83.7 kg)     Temp Readings from Last 3 Encounters:   01/19/22 97.5 °F (36.4 °C) (Temporal)   01/18/22 98 °F (36.7 °C)   12/13/21 98 °F (36.7 °C) (Temporal)     BP Readings from Last 3 Encounters:   01/19/22 (!) 148/84   01/18/22 (!) 146/85   12/13/21 129/78     Pulse Readings from Last 3 Encounters:   01/19/22 99   01/18/22 73   12/13/21 71         Learning Assessment:  :     Learning Assessment 9/25/2014   PRIMARY LEARNER Patient   PRIMARY LANGUAGE ENGLISH   LEARNER PREFERENCE PRIMARY LISTENING   ANSWERED BY self   RELATIONSHIP SELF       Depression Screening:  :     3 most recent PHQ Screens 1/19/2022   Little interest or pleasure in doing things Not at all   Feeling down, depressed, irritable, or hopeless Not at all   Total Score PHQ 2 0       Fall Risk Assessment:  :     Fall Risk Assessment, last 12 mths 1/19/2022   Able to walk? Yes   Fall in past 12 months? 0   Do you feel unsteady? 0   Are you worried about falling 0       Abuse Screening:  :     Abuse Screening Questionnaire 1/19/2022 8/31/2021   Do you ever feel afraid of your partner? N N   Are you in a relationship with someone who physically or mentally threatens you? N N   Is it safe for you to go home?  Sumit Wright

## 2022-01-20 ENCOUNTER — HOSPITAL ENCOUNTER (OUTPATIENT)
Dept: PHYSICAL THERAPY | Age: 72
Discharge: HOME | End: 2022-01-20
Attending: PHYSICIAN ASSISTANT
Payer: MEDICARE

## 2022-01-20 PROCEDURE — 97110 THERAPEUTIC EXERCISES: CPT | Performed by: PHYSICAL THERAPIST

## 2022-01-20 PROCEDURE — 97140 MANUAL THERAPY 1/> REGIONS: CPT | Performed by: PHYSICAL THERAPIST

## 2022-01-20 RX ORDER — ACETAMINOPHEN 500 MG
1000 TABLET ORAL ONCE
Status: CANCELLED | OUTPATIENT
Start: 2022-01-20 | End: 2022-01-20

## 2022-01-20 RX ORDER — CELECOXIB 200 MG/1
200 CAPSULE ORAL ONCE
Status: CANCELLED | OUTPATIENT
Start: 2022-01-20 | End: 2022-01-20

## 2022-01-20 NOTE — PROGRESS NOTES
PT Discharge summary / DAILY TREATMENT NOTE - [facility] 2-15    Patient Name: Carmen Hyatt  Date:2022  : 1950    Patient  Verified  Payor: Melvi Crespo / Plan: [place] / Product Type: Medicare /    In time:455 Out time:  540     Total Treatment Time (min): 45  Total Timed Codes (min): 45  1:1 Treatment Time [facility] only):30    Visit #: 8    Treatment Area: Right hip pain [M25.551]    SUBJECTIVE  Pain Level (0-10 scale), subjective functional status/changes: Pt reports 0/10 pain unless he is transitioning from sit to stand. He has his post op joint class next week and surgery is scheduled for . Any medication changes, allergies to medications, adverse drug reactions, diagnosis change, or new procedure performed?: [x] No    [] Yes (see summary sheet for update) SEE ABOVE. OBJECTIVE       - min Modality:      []  Ice     []  Heat       Position/location:   -   Rationale: decrease pain to improve the patients ability to do functional activities   [x] Skin assessment post-treatment:  [x]intact []redness- no adverse reaction    []redness  adverse reaction:      35 min Therapeutic Exercise:  [x] See flow sheet :    Rationale: increase strength, improve coordination and increase proprioception to improve the patients ability to strengthen, increased flexibility prior to PHILOMENA    10 min Manual Therapy:  Manual quad stretching in prone, pillow under his hips. Manual right hamstring stretch. Rationale: decrease pain, increase tissue extensibility and decrease trigger points  to improve the patients ability to strengthen, decrease pain prior to PHILOMENA.             With   [] TE   [] TA   [] neuro   [] other: Patient Education: [x] Review HEP    [] Progressed/Changed HEP based on:   [] positioning   [] body mechanics   [] transfers   [] heat/ice application    [] other:        Pain Level (0-10 scale) post treatment: 0    ASSESSMENT/Changes in Function:   Pt with 8 skilled visits to prepare for R PHILOMENA which included stretching and strengthening exercises. We also discussed / answered numerous questions regarding R PHILOMENA and encouraged him to cont. To use his rec. Stepper at home for ROM and endurance prior to surgery. Pt ready for D/C to HEP. Short Term Goals: To be accomplished in 2-3 weeks:              1) Pt independent in HEP from day 1 MET                  Long Term Goals: To be accomplished in 4-6 weeks:              1) Pt independent in final HEP prior to her planned surgery. MET              2) Pt will note 25% decrease in pain with sit to stand transfer. not met                  PLAN      [x]  D/C to HEP.     []  Other:_      Colten Flores, PT DPT, OCS 1/20/2022  3:96 PM       PT License #[id number]

## 2022-01-20 NOTE — ANCILLARY DISCHARGE INSTRUCTIONS
PT Discharge summary / DAILY TREATMENT NOTE - [facility] 2-15     Patient Name: Pascale Singh  Date:2022  : 1950    Patient  Verified  Payor: Luciano Gaytan / Plan: [place] PART [place] & B / Product Type: Medicare /    In time:455 Out time:  540     Total Treatment Time (min): 45  Total Timed Codes (min): 45  1:1 Treatment Time [facility] only):30    Visit #: 8     Treatment Area: Right hip pain [M25.551]     SUBJECTIVE  Pain Level (0-10 scale), subjective functional status/changes: Pt reports 0/10 pain unless he is transitioning from sit to stand. He has his post op joint class next week and surgery is scheduled for .      Any medication changes, allergies to medications, adverse drug reactions, diagnosis change, or new procedure performed?: [x]? No    []? Yes (see summary sheet for update) SEE ABOVE.         OBJECTIVE         - min Modality:      []? Ice     []? Heat       Position/location:   -   Rationale: decrease pain to improve the patients ability to do functional activities   [x]? Skin assessment post-treatment:  [x]? intact []? redness- no adverse reaction    []? redness  adverse reaction:                 35 min Therapeutic Exercise:  [x]? See flow sheet :    Rationale: increase strength, improve coordination and increase proprioception to improve the patients ability to strengthen, increased flexibility prior to PHILOMENA     10 min Manual Therapy:  Manual quad stretching in prone, pillow under his hips. Manual right hamstring stretch.          Rationale: decrease pain, increase tissue extensibility and decrease trigger points  to improve the patients ability to strengthen, decrease pain prior to PHILOMENA. With   []? TE   []? TA   []? neuro   []? other: Patient Education: [x]? Review HEP    []? Progressed/Changed HEP based on:   []? positioning   []? body mechanics   []? transfers   []? heat/ice application    []?  other:          Pain Level (0-10 scale) post treatment: 0     ASSESSMENT/Changes in Function:   Pt with 8 skilled visits to prepare for R PHILOMENA which included stretching and strengthening exercises. We also discussed / answered numerous questions regarding R PHILOMENA and encouraged him to cont. To use his rec. Stepper at home for ROM and endurance prior to surgery. Pt ready for D/C to HEP. Short Term Goals: To be accomplished in 2-3 weeks:              1) Pt independent in HEP from day 1 MET  [address] be accomplished in 4-6 weeks:              1) Pt independent in final HEP prior to her planned surgery. MET              2) Pt will note 25% decrease in pain with sit to stand transfer.  not met                   PLAN      [x]? D/C to HEP.     []?   Other:_       Mac Gonzales, PT DPT, OCS         1/20/2022                    7:70 PM       PT License #[id number]

## 2022-01-24 ENCOUNTER — HOSPITAL ENCOUNTER (OUTPATIENT)
Dept: PREADMISSION TESTING | Age: 72
Discharge: HOME | End: 2022-01-24
Attending: ORTHOPAEDIC SURGERY
Payer: MEDICARE

## 2022-01-24 DIAGNOSIS — Z01.812 PRE-PROCEDURE LAB EXAM: ICD-10-CM

## 2022-01-24 PROCEDURE — U0005 INFEC AGEN DETEC AMPLI PROBE: HCPCS

## 2022-01-25 LAB
SARS-COV-2, XPLCVT: DETECTED
SOURCE, COVRS: ABNORMAL

## 2022-02-02 NOTE — PERIOP NOTES
PATIENT CALLED TO ASK IF HE NEEDED NEW COVID TEST DONE. TESTED POSITIVE ON 1/24/22. INFORMED HIM THAT ACCORDING TO OUR GUIDLINES HE DOES NOT NEED TO BE TESTED SINCE TESTED POSITIVE WITHIN 120 DAYS. STATED THAT HE HAS BEEN ASYMPTOMATIC THE WHOLE TIME.

## 2022-02-09 ENCOUNTER — APPOINTMENT (OUTPATIENT)
Dept: GENERAL RADIOLOGY | Age: 72
End: 2022-02-09
Attending: PHYSICIAN ASSISTANT
Payer: MEDICARE

## 2022-02-09 ENCOUNTER — HOSPITAL ENCOUNTER (OUTPATIENT)
Age: 72
Discharge: NURSING, REHAB OR LONG-TERM CARE | End: 2022-02-10
Attending: ORTHOPAEDIC SURGERY | Admitting: ORTHOPAEDIC SURGERY
Payer: MEDICARE

## 2022-02-09 ENCOUNTER — ANESTHESIA (OUTPATIENT)
Dept: SURGERY | Age: 72
End: 2022-02-09
Payer: MEDICARE

## 2022-02-09 ENCOUNTER — ANESTHESIA EVENT (OUTPATIENT)
Dept: SURGERY | Age: 72
End: 2022-02-09
Payer: MEDICARE

## 2022-02-09 DIAGNOSIS — Z96.641 S/P HIP REPLACEMENT, RIGHT: Primary | ICD-10-CM

## 2022-02-09 PROBLEM — M16.11 PRIMARY LOCALIZED OSTEOARTHRITIS OF RIGHT HIP: Status: ACTIVE | Noted: 2022-02-09

## 2022-02-09 LAB
GLUCOSE BLD STRIP.AUTO-MCNC: 93 MG/DL (ref 65–117)
SERVICE CMNT-IMP: NORMAL

## 2022-02-09 PROCEDURE — 77030007866 HC KT SPN ANES BBMI -B: Performed by: ANESTHESIOLOGY

## 2022-02-09 PROCEDURE — 74011000250 HC RX REV CODE- 250: Performed by: ANESTHESIOLOGY

## 2022-02-09 PROCEDURE — 77030003882 HC BIT DRL TWST BRSM -B: Performed by: ORTHOPAEDIC SURGERY

## 2022-02-09 PROCEDURE — 74011000258 HC RX REV CODE- 258: Performed by: ANESTHESIOLOGY

## 2022-02-09 PROCEDURE — 77030002933 HC SUT MCRYL J&J -A: Performed by: ORTHOPAEDIC SURGERY

## 2022-02-09 PROCEDURE — 74011000250 HC RX REV CODE- 250

## 2022-02-09 PROCEDURE — 2709999900 HC NON-CHARGEABLE SUPPLY: Performed by: ORTHOPAEDIC SURGERY

## 2022-02-09 PROCEDURE — 74011250636 HC RX REV CODE- 250/636: Performed by: ANESTHESIOLOGY

## 2022-02-09 PROCEDURE — 77030003666 HC NDL SPINAL BD -A: Performed by: ANESTHESIOLOGY

## 2022-02-09 PROCEDURE — 76210000016 HC OR PH I REC 1 TO 1.5 HR: Performed by: ORTHOPAEDIC SURGERY

## 2022-02-09 PROCEDURE — C1776 JOINT DEVICE (IMPLANTABLE): HCPCS | Performed by: ORTHOPAEDIC SURGERY

## 2022-02-09 PROCEDURE — 77030018547 HC SUT ETHBND1 J&J -B: Performed by: ORTHOPAEDIC SURGERY

## 2022-02-09 PROCEDURE — 77030006822 HC BLD SAW SAG BRSM -B: Performed by: ORTHOPAEDIC SURGERY

## 2022-02-09 PROCEDURE — 77030010507 HC ADH SKN DERMBND J&J -B: Performed by: ORTHOPAEDIC SURGERY

## 2022-02-09 PROCEDURE — 72170 X-RAY EXAM OF PELVIS: CPT

## 2022-02-09 PROCEDURE — 74011000250 HC RX REV CODE- 250: Performed by: PHYSICIAN ASSISTANT

## 2022-02-09 PROCEDURE — 74011250637 HC RX REV CODE- 250/637

## 2022-02-09 PROCEDURE — 27130 TOTAL HIP ARTHROPLASTY: CPT | Performed by: ORTHOPAEDIC SURGERY

## 2022-02-09 PROCEDURE — 74011250636 HC RX REV CODE- 250/636

## 2022-02-09 PROCEDURE — 77030031139 HC SUT VCRL2 J&J -A: Performed by: ORTHOPAEDIC SURGERY

## 2022-02-09 PROCEDURE — 82962 GLUCOSE BLOOD TEST: CPT

## 2022-02-09 PROCEDURE — 76010000172 HC OR TIME 2.5 TO 3 HR INTENSV-TIER 1: Performed by: ORTHOPAEDIC SURGERY

## 2022-02-09 PROCEDURE — 51798 US URINE CAPACITY MEASURE: CPT

## 2022-02-09 PROCEDURE — 27130 TOTAL HIP ARTHROPLASTY: CPT | Performed by: PHYSICIAN ASSISTANT

## 2022-02-09 PROCEDURE — 77030018074 HC RTVR SUT ARTH4 S&N -B: Performed by: ORTHOPAEDIC SURGERY

## 2022-02-09 PROCEDURE — 74011250637 HC RX REV CODE- 250/637: Performed by: PHYSICIAN ASSISTANT

## 2022-02-09 PROCEDURE — 76060000036 HC ANESTHESIA 2.5 TO 3 HR: Performed by: ORTHOPAEDIC SURGERY

## 2022-02-09 PROCEDURE — 77030033067 HC SUT PDO STRATFX SPIR J&J -B: Performed by: ORTHOPAEDIC SURGERY

## 2022-02-09 PROCEDURE — 77030005513 HC CATH URETH FOL11 MDII -B: Performed by: ORTHOPAEDIC SURGERY

## 2022-02-09 PROCEDURE — 74011250636 HC RX REV CODE- 250/636: Performed by: PHYSICIAN ASSISTANT

## 2022-02-09 PROCEDURE — 77030018723 HC ELCTRD BLD COVD -A: Performed by: ORTHOPAEDIC SURGERY

## 2022-02-09 DEVICE — EMPOWR ACETABULAR SYSTEM, LINER, NEUTRAL, HXE+, 40H
Type: IMPLANTABLE DEVICE | Site: HIP | Status: FUNCTIONAL
Brand: DJO SURGICAL

## 2022-02-09 DEVICE — IMPL CAPPED HIP H2 TOTAL ADV OTHER HEAD DJO: Type: IMPLANTABLE DEVICE | Status: FUNCTIONAL

## 2022-02-09 DEVICE — EMPOWR ACET SYSTEM, CUP, HEMISPHERICAL, CLUSTER HOLE, 58MM
Type: IMPLANTABLE DEVICE | Site: HIP | Status: FUNCTIONAL
Brand: DJO SURGICAL

## 2022-02-09 DEVICE — EMPOWR ACETABULAR, BONE SCREW, 40MM
Type: IMPLANTABLE DEVICE | Site: HIP | Status: FUNCTIONAL
Brand: DJO SURGICAL

## 2022-02-09 DEVICE — TAPERFILL HIP STEM, STANDARD, SIZE 13
Type: IMPLANTABLE DEVICE | Site: HIP | Status: FUNCTIONAL
Brand: DJO SURGICAL

## 2022-02-09 DEVICE — HEAD, FEMORAL, CERAMIC, BILOX DELTA, 40MM NEUTRAL
Type: IMPLANTABLE DEVICE | Site: HIP | Status: FUNCTIONAL
Brand: DJO SURGICAL

## 2022-02-09 RX ORDER — FENTANYL CITRATE 50 UG/ML
50 INJECTION, SOLUTION INTRAMUSCULAR; INTRAVENOUS AS NEEDED
Status: DISCONTINUED | OUTPATIENT
Start: 2022-02-09 | End: 2022-02-09 | Stop reason: HOSPADM

## 2022-02-09 RX ORDER — SODIUM CHLORIDE 9 MG/ML
25 INJECTION, SOLUTION INTRAVENOUS CONTINUOUS
Status: DISCONTINUED | OUTPATIENT
Start: 2022-02-09 | End: 2022-02-09 | Stop reason: HOSPADM

## 2022-02-09 RX ORDER — POLYETHYLENE GLYCOL 3350 17 G/17G
17 POWDER, FOR SOLUTION ORAL DAILY
Status: DISCONTINUED | OUTPATIENT
Start: 2022-02-10 | End: 2022-02-10 | Stop reason: HOSPADM

## 2022-02-09 RX ORDER — LIDOCAINE HYDROCHLORIDE 20 MG/ML
INJECTION, SOLUTION EPIDURAL; INFILTRATION; INTRACAUDAL; PERINEURAL AS NEEDED
Status: DISCONTINUED | OUTPATIENT
Start: 2022-02-09 | End: 2022-02-09 | Stop reason: HOSPADM

## 2022-02-09 RX ORDER — LIDOCAINE HYDROCHLORIDE 10 MG/ML
0.1 INJECTION, SOLUTION EPIDURAL; INFILTRATION; INTRACAUDAL; PERINEURAL AS NEEDED
Status: DISCONTINUED | OUTPATIENT
Start: 2022-02-09 | End: 2022-02-09 | Stop reason: HOSPADM

## 2022-02-09 RX ORDER — ACETAMINOPHEN 500 MG
1000 TABLET ORAL ONCE
Status: COMPLETED | OUTPATIENT
Start: 2022-02-09 | End: 2022-02-09

## 2022-02-09 RX ORDER — OXYCODONE HYDROCHLORIDE 5 MG/1
2.5 TABLET ORAL
Status: DISCONTINUED | OUTPATIENT
Start: 2022-02-09 | End: 2022-02-10 | Stop reason: HOSPADM

## 2022-02-09 RX ORDER — ONDANSETRON 2 MG/ML
4 INJECTION INTRAMUSCULAR; INTRAVENOUS
Status: ACTIVE | OUTPATIENT
Start: 2022-02-09 | End: 2022-02-10

## 2022-02-09 RX ORDER — HYDROCODONE BITARTRATE AND ACETAMINOPHEN 5; 325 MG/1; MG/1
1 TABLET ORAL AS NEEDED
Status: DISCONTINUED | OUTPATIENT
Start: 2022-02-09 | End: 2022-02-09 | Stop reason: HOSPADM

## 2022-02-09 RX ORDER — FACIAL-BODY WIPES
10 EACH TOPICAL DAILY PRN
Status: DISCONTINUED | OUTPATIENT
Start: 2022-02-11 | End: 2022-02-10 | Stop reason: HOSPADM

## 2022-02-09 RX ORDER — NALOXONE HYDROCHLORIDE 0.4 MG/ML
0.4 INJECTION, SOLUTION INTRAMUSCULAR; INTRAVENOUS; SUBCUTANEOUS AS NEEDED
Status: DISCONTINUED | OUTPATIENT
Start: 2022-02-09 | End: 2022-02-10 | Stop reason: HOSPADM

## 2022-02-09 RX ORDER — BUPIVACAINE HYDROCHLORIDE 5 MG/ML
INJECTION, SOLUTION EPIDURAL; INTRACAUDAL
Status: COMPLETED | OUTPATIENT
Start: 2022-02-09 | End: 2022-02-09

## 2022-02-09 RX ORDER — MIDAZOLAM HYDROCHLORIDE 1 MG/ML
1 INJECTION, SOLUTION INTRAMUSCULAR; INTRAVENOUS AS NEEDED
Status: DISCONTINUED | OUTPATIENT
Start: 2022-02-09 | End: 2022-02-09 | Stop reason: HOSPADM

## 2022-02-09 RX ORDER — KETOROLAC TROMETHAMINE 30 MG/ML
15 INJECTION, SOLUTION INTRAMUSCULAR; INTRAVENOUS EVERY 6 HOURS
Status: COMPLETED | OUTPATIENT
Start: 2022-02-09 | End: 2022-02-10

## 2022-02-09 RX ORDER — HYDROMORPHONE HYDROCHLORIDE 1 MG/ML
0.5 INJECTION, SOLUTION INTRAMUSCULAR; INTRAVENOUS; SUBCUTANEOUS
Status: ACTIVE | OUTPATIENT
Start: 2022-02-09 | End: 2022-02-10

## 2022-02-09 RX ORDER — HYDROXYZINE HYDROCHLORIDE 10 MG/1
10 TABLET, FILM COATED ORAL
Status: DISCONTINUED | OUTPATIENT
Start: 2022-02-09 | End: 2022-02-10 | Stop reason: HOSPADM

## 2022-02-09 RX ORDER — SODIUM CHLORIDE 9 MG/ML
125 INJECTION, SOLUTION INTRAVENOUS CONTINUOUS
Status: DISPENSED | OUTPATIENT
Start: 2022-02-09 | End: 2022-02-10

## 2022-02-09 RX ORDER — SODIUM CHLORIDE 9 MG/ML
INJECTION, SOLUTION INTRAVENOUS
Status: DISCONTINUED | OUTPATIENT
Start: 2022-02-09 | End: 2022-02-09 | Stop reason: HOSPADM

## 2022-02-09 RX ORDER — ASPIRIN 81 MG/1
81 TABLET ORAL 2 TIMES DAILY
Status: DISCONTINUED | OUTPATIENT
Start: 2022-02-09 | End: 2022-02-10 | Stop reason: HOSPADM

## 2022-02-09 RX ORDER — ALBUTEROL SULFATE 0.83 MG/ML
2.5 SOLUTION RESPIRATORY (INHALATION) AS NEEDED
Status: DISCONTINUED | OUTPATIENT
Start: 2022-02-09 | End: 2022-02-09 | Stop reason: HOSPADM

## 2022-02-09 RX ORDER — MIDAZOLAM HYDROCHLORIDE 1 MG/ML
0.5 INJECTION, SOLUTION INTRAMUSCULAR; INTRAVENOUS
Status: DISCONTINUED | OUTPATIENT
Start: 2022-02-09 | End: 2022-02-09 | Stop reason: HOSPADM

## 2022-02-09 RX ORDER — SODIUM CHLORIDE, SODIUM LACTATE, POTASSIUM CHLORIDE, CALCIUM CHLORIDE 600; 310; 30; 20 MG/100ML; MG/100ML; MG/100ML; MG/100ML
1000 INJECTION, SOLUTION INTRAVENOUS CONTINUOUS
Status: DISCONTINUED | OUTPATIENT
Start: 2022-02-09 | End: 2022-02-09 | Stop reason: HOSPADM

## 2022-02-09 RX ORDER — PROPOFOL 10 MG/ML
INJECTION, EMULSION INTRAVENOUS
Status: DISCONTINUED | OUTPATIENT
Start: 2022-02-09 | End: 2022-02-09 | Stop reason: HOSPADM

## 2022-02-09 RX ORDER — SODIUM CHLORIDE 0.9 % (FLUSH) 0.9 %
5-40 SYRINGE (ML) INJECTION AS NEEDED
Status: DISCONTINUED | OUTPATIENT
Start: 2022-02-09 | End: 2022-02-10 | Stop reason: HOSPADM

## 2022-02-09 RX ORDER — SODIUM CHLORIDE 0.9 % (FLUSH) 0.9 %
5-40 SYRINGE (ML) INJECTION EVERY 8 HOURS
Status: DISCONTINUED | OUTPATIENT
Start: 2022-02-09 | End: 2022-02-10 | Stop reason: HOSPADM

## 2022-02-09 RX ORDER — SODIUM CHLORIDE, SODIUM LACTATE, POTASSIUM CHLORIDE, CALCIUM CHLORIDE 600; 310; 30; 20 MG/100ML; MG/100ML; MG/100ML; MG/100ML
INJECTION, SOLUTION INTRAVENOUS
Status: DISCONTINUED | OUTPATIENT
Start: 2022-02-09 | End: 2022-02-09 | Stop reason: HOSPADM

## 2022-02-09 RX ORDER — ONDANSETRON 2 MG/ML
INJECTION INTRAMUSCULAR; INTRAVENOUS AS NEEDED
Status: DISCONTINUED | OUTPATIENT
Start: 2022-02-09 | End: 2022-02-09 | Stop reason: HOSPADM

## 2022-02-09 RX ORDER — HYDROMORPHONE HYDROCHLORIDE 1 MG/ML
0.2 INJECTION, SOLUTION INTRAMUSCULAR; INTRAVENOUS; SUBCUTANEOUS
Status: DISCONTINUED | OUTPATIENT
Start: 2022-02-09 | End: 2022-02-09 | Stop reason: HOSPADM

## 2022-02-09 RX ORDER — ACETAMINOPHEN 325 MG/1
650 TABLET ORAL ONCE
Status: DISCONTINUED | OUTPATIENT
Start: 2022-02-09 | End: 2022-02-09 | Stop reason: HOSPADM

## 2022-02-09 RX ORDER — FENTANYL CITRATE 50 UG/ML
25 INJECTION, SOLUTION INTRAMUSCULAR; INTRAVENOUS
Status: DISCONTINUED | OUTPATIENT
Start: 2022-02-09 | End: 2022-02-09 | Stop reason: HOSPADM

## 2022-02-09 RX ORDER — OXYCODONE HYDROCHLORIDE 5 MG/1
5 TABLET ORAL
Status: DISCONTINUED | OUTPATIENT
Start: 2022-02-09 | End: 2022-02-10 | Stop reason: HOSPADM

## 2022-02-09 RX ORDER — MORPHINE SULFATE 2 MG/ML
2 INJECTION, SOLUTION INTRAMUSCULAR; INTRAVENOUS
Status: DISCONTINUED | OUTPATIENT
Start: 2022-02-09 | End: 2022-02-09 | Stop reason: HOSPADM

## 2022-02-09 RX ORDER — ROPIVACAINE HYDROCHLORIDE 5 MG/ML
30 INJECTION, SOLUTION EPIDURAL; INFILTRATION; PERINEURAL AS NEEDED
Status: DISCONTINUED | OUTPATIENT
Start: 2022-02-09 | End: 2022-02-09 | Stop reason: HOSPADM

## 2022-02-09 RX ORDER — ACETAMINOPHEN 500 MG
500 TABLET ORAL
Status: DISCONTINUED | OUTPATIENT
Start: 2022-02-09 | End: 2022-02-10 | Stop reason: HOSPADM

## 2022-02-09 RX ORDER — ATORVASTATIN CALCIUM 20 MG/1
20 TABLET, FILM COATED ORAL
Status: DISCONTINUED | OUTPATIENT
Start: 2022-02-10 | End: 2022-02-10 | Stop reason: HOSPADM

## 2022-02-09 RX ORDER — GLYCOPYRROLATE 0.2 MG/ML
0.2 INJECTION INTRAMUSCULAR; INTRAVENOUS
Status: DISCONTINUED | OUTPATIENT
Start: 2022-02-09 | End: 2022-02-09 | Stop reason: HOSPADM

## 2022-02-09 RX ORDER — AMOXICILLIN 250 MG
1 CAPSULE ORAL 2 TIMES DAILY
Status: DISCONTINUED | OUTPATIENT
Start: 2022-02-09 | End: 2022-02-10 | Stop reason: HOSPADM

## 2022-02-09 RX ORDER — CELECOXIB 200 MG/1
200 CAPSULE ORAL ONCE
Status: COMPLETED | OUTPATIENT
Start: 2022-02-09 | End: 2022-02-09

## 2022-02-09 RX ORDER — MIDAZOLAM HYDROCHLORIDE 1 MG/ML
INJECTION, SOLUTION INTRAMUSCULAR; INTRAVENOUS AS NEEDED
Status: DISCONTINUED | OUTPATIENT
Start: 2022-02-09 | End: 2022-02-09 | Stop reason: HOSPADM

## 2022-02-09 RX ORDER — DIPHENHYDRAMINE HYDROCHLORIDE 50 MG/ML
12.5 INJECTION, SOLUTION INTRAMUSCULAR; INTRAVENOUS AS NEEDED
Status: DISCONTINUED | OUTPATIENT
Start: 2022-02-09 | End: 2022-02-09 | Stop reason: HOSPADM

## 2022-02-09 RX ORDER — PROPOFOL 10 MG/ML
INJECTION, EMULSION INTRAVENOUS AS NEEDED
Status: DISCONTINUED | OUTPATIENT
Start: 2022-02-09 | End: 2022-02-09 | Stop reason: HOSPADM

## 2022-02-09 RX ORDER — ONDANSETRON 2 MG/ML
4 INJECTION INTRAMUSCULAR; INTRAVENOUS AS NEEDED
Status: DISCONTINUED | OUTPATIENT
Start: 2022-02-09 | End: 2022-02-09 | Stop reason: HOSPADM

## 2022-02-09 RX ADMIN — SODIUM CHLORIDE, POTASSIUM CHLORIDE, SODIUM LACTATE AND CALCIUM CHLORIDE: 600; 310; 30; 20 INJECTION, SOLUTION INTRAVENOUS at 12:52

## 2022-02-09 RX ADMIN — ACETAMINOPHEN 500 MG: 500 TABLET ORAL at 20:44

## 2022-02-09 RX ADMIN — SENNOSIDES AND DOCUSATE SODIUM 1 TABLET: 8.6; 5 TABLET ORAL at 20:44

## 2022-02-09 RX ADMIN — PHENYLEPHRINE HYDROCHLORIDE 40 MCG/MIN: 10 INJECTION INTRAVENOUS at 13:00

## 2022-02-09 RX ADMIN — ONDANSETRON HYDROCHLORIDE 4 MG: 2 INJECTION, SOLUTION INTRAMUSCULAR; INTRAVENOUS at 14:55

## 2022-02-09 RX ADMIN — TRANEXAMIC ACID 1 G: 100 INJECTION, SOLUTION INTRAVENOUS at 13:27

## 2022-02-09 RX ADMIN — MIDAZOLAM 2 MG: 1 INJECTION INTRAMUSCULAR; INTRAVENOUS at 12:52

## 2022-02-09 RX ADMIN — PROPOFOL 50 MCG/KG/MIN: 10 INJECTION, EMULSION INTRAVENOUS at 12:55

## 2022-02-09 RX ADMIN — SODIUM CHLORIDE 125 ML/HR: 9 INJECTION, SOLUTION INTRAVENOUS at 20:58

## 2022-02-09 RX ADMIN — CELECOXIB 200 MG: 200 CAPSULE ORAL at 11:39

## 2022-02-09 RX ADMIN — BUPIVACAINE HYDROCHLORIDE 11 MG: 5 INJECTION, SOLUTION EPIDURAL; INTRACAUDAL; PERINEURAL at 13:20

## 2022-02-09 RX ADMIN — SODIUM CHLORIDE, POTASSIUM CHLORIDE, SODIUM LACTATE AND CALCIUM CHLORIDE 1000 ML: 600; 310; 30; 20 INJECTION, SOLUTION INTRAVENOUS at 11:38

## 2022-02-09 RX ADMIN — SODIUM CHLORIDE 125 ML/HR: 9 INJECTION, SOLUTION INTRAVENOUS at 15:46

## 2022-02-09 RX ADMIN — SODIUM CHLORIDE: 900 INJECTION, SOLUTION INTRAVENOUS at 15:12

## 2022-02-09 RX ADMIN — WATER 2 G: 1 INJECTION INTRAMUSCULAR; INTRAVENOUS; SUBCUTANEOUS at 20:44

## 2022-02-09 RX ADMIN — ACETAMINOPHEN 1000 MG: 500 TABLET ORAL at 11:39

## 2022-02-09 RX ADMIN — PROPOFOL 50 MG: 10 INJECTION, EMULSION INTRAVENOUS at 12:58

## 2022-02-09 RX ADMIN — LIDOCAINE HYDROCHLORIDE 80 MG: 20 INJECTION, SOLUTION EPIDURAL; INFILTRATION; INTRACAUDAL; PERINEURAL at 12:55

## 2022-02-09 RX ADMIN — ASPIRIN 81 MG: 81 TABLET, COATED ORAL at 20:44

## 2022-02-09 RX ADMIN — WATER 2 G: 1 INJECTION INTRAMUSCULAR; INTRAVENOUS; SUBCUTANEOUS at 13:27

## 2022-02-09 RX ADMIN — KETOROLAC TROMETHAMINE 15 MG: 30 INJECTION, SOLUTION INTRAMUSCULAR; INTRAVENOUS at 21:00

## 2022-02-09 NOTE — BRIEF OP NOTE
Brief Postoperative Note    Patient: Stephani Pineda  YOB: 1950  MRN: [id number]    Date of Procedure: 2/9/2022     Pre-Op Diagnosis: Primary osteoarthritis of right hip [M16.11]    Post-Op Diagnosis: Same as preoperative diagnosis. Procedure(s):  RIGHT TOTAL HIP ARTHROPLASTY (SPINAL WITH IV SEDATION)    Surgeon(s):  Momo Villa MD    Surgical Assistant: Physician Assistant: Josh Mendosa PA-C  Surg Asst-1: Yulia Lowe    Anesthesia: Spinal     Estimated Blood Loss (mL): 681    Complications: None    Specimens: * No specimens in log *     Implants:   Implant Name Type Inv.  Item Serial No.  Lot No. LRB No. Used Action   CUP ACET CLUS HOLE H 58 MM W/ DOME HOLE PLUG P2 COAT EMPOWR - SNA  CUP ACET CLUS HOLE H 58 MM W/ DOME HOLE PLUG P2 COAT EMPOWR NA [facility] SURGICAL_ [id number] Right 1 Implanted   LINER ACET NEUT H 40X58 MM HIP HXE+ VIT E POLYETH EMPOWR - SNA  LINER ACET NEUT H 40X58 MM HIP HXE+ VIT E POLYETH EMPOWR NA [facility] SURGICAL_ [id number] Right 1 Implanted   STEM FEM STD OFFSET 13  MM 37 MM HIP CMNTLS CLLRLSS TI - SNA  STEM FEM STD OFFSET 13  MM 37 MM HIP CMNTLS CLLRLSS TI NA [facility] SURGICAL_ [id number] Right 1 Implanted   SCREW BNE 40 MM ACET EMPOWR - SNA  SCREW BNE 40 MM ACET EMPOWR NA [facility] SURGICAL_ [id number] Right 1 Implanted   HEAD FEM NEUT 40 MM HIP OFFSET FOR FMP SYS BIOLOX DELT CERM - SNA  HEAD FEM NEUT 40 MM HIP OFFSET FOR FMP SYS BIOLOX DELT CERM NA [facility] SURGICAL_ [id number] Right 1 Implanted       Drains: * No LDAs found *    Findings: right hip oa    Electronically Signed by Jenna Carrillo PA-C on 2/9/2022 at 3:25 PM

## 2022-02-09 NOTE — PROGRESS NOTES
TRANSFER - OUT REPORT:    Verbal report given to [facility] (name) on Oval Mei  being transferred to   (unit) for routine progression of care       Report consisted of patients Situation, Background, Assessment and   Recommendations(SBAR). Information from the following report(s) SBAR, Kardex, OR Summary, Intake/Output and MAR was reviewed with the receiving nurse. Lines:   Peripheral IV 02/09/22 Left Hand (Active)   Site Assessment Clean, dry, & intact 02/09/22 1538   Phlebitis Assessment 0 02/09/22 1538   Infiltration Assessment 0 02/09/22 1538   Dressing Status Clean, dry, & intact 02/09/22 1538   Dressing Type Transparent 02/09/22 1538   Hub Color/Line Status Infusing 02/09/22 1538   Action Taken Open ports on tubing capped 02/09/22 1538   Alcohol Cap Used Yes 02/09/22 1538        Opportunity for questions and clarification was provided.       Patient transported with:   [employer]

## 2022-02-09 NOTE — ANESTHESIA PROCEDURE NOTES
Spinal Block    Start time: 2/9/2022 12:57 PM  End time: 2/9/2022 1:20 PM  Performed by: Yaz Luu CRNA  Authorized by: Aris Marshall MD     Pre-procedure:   Indications: primary anesthetic  Preanesthetic Checklist: patient identified, risks and benefits discussed, anesthesia consent, site marked, patient being monitored and timeout performed    Timeout Time: 12:57 EST          Spinal Block:   Patient Position:  Seated  Prep Region:  Lumbar  Prep: DuraPrep      Location:  L3-4  Technique:  Single shot    Local Dose (mL):  2    Needle:   Needle Type:  Pencan  Needle Gauge:  25 G  Attempts:  2      Events: CSF confirmed and no blood with aspiration        Assessment:  Insertion:  Uncomplicated  Patient tolerance:  Patient tolerated the procedure well with no immediate complications

## 2022-02-09 NOTE — DISCHARGE INSTRUCTIONS
Post-op Discharge Instructions Following Total Joint Replacement  Raciel Elizabeth MD  [address]  [phone]  [place] Loss See Dr. Marcia Lyons approximately 3-4 weeks from date of surgery. Call [phone] to make an appointment.  Call Rehana Madison RN if you have questions or concerns, [phone]. Activity   Use your walker for ambulation. Weight bearing as tolerated unless instructed otherwise by the physical therapist. Get up every hour you are awake and take a brief walk. Lengthen walking distance daily as your strength improves.  Continue using your walker until seen in the office for your first follow up visit.  Practice your exercises 3 times daily as instructed by the physical therapist. Judah Haque for 20 minutes after exercising.  No driving until seen in the office for your first follow up visit. Incision Care   The light brown Aquacel surgical dressing is waterproof and is to remain on your incision for 7 days. On the 7th day, carefully lift the edge of the dressing to break the adhesive seal and gently peel it off.  If your Aquacel dressings comes loose or falls off before the 7th day, replace it with a dry sterile gauze dressing and change this dressing daily. Once there is no drainage on the bandage, you mean leave the incision open to air.  You may take a shower with the Aquacel dressing in place. After you remove the Aquacel dressing on day 7, you may continue to shower and get your incision wet in the shower. Do not submerge your incision under water in a bathtub, hot tub, swimming pool, etc. until after you have been evaluated at your first office visit. Medications   Blood Clot Prevention: Take medication as prescribed by your physician for 4 weeks postop.  Pain Management: Take pain medication as prescribed; wean yourself off of pain medication as your pain lessens. Take with food.  You make also take Tylenol every 4-6 hours as needed for pain.   Do not exceed 3 grams (3000mg) per day.  Place an ice bag on or around the incision for 20 minutes on / 20 minutes off as needed throughout the day and night, especially after exercising.  Stool Softener: You may want to take a stool softener (such as Senokot-S or Colace) to prevent constipation while taking pain medication. If constipation occurs, you may also use a laxative (such as Dulcolax tablets, Miralax, or a suppository). Diet   Resume usual diet at home. Drink plenty of fluids. Eat foods high in fiber and protein. Calcium and Vitamin D supplements recommended. Avoid alcoholic beverages. No smoking. When to call your Orthopaedic Surgeon: If you call after 5pm or on a weekend, the on call physician will return your call   Pain that is not relieved by pain medication, ice, and activity modification   Signs of infection (red incision, continuous drainage from the incision, malodorous drainage, persistent fever greater than 101 degrees Fahrenheit)   Signs of a blood clot in your leg (calf pain, tenderness, redness, and/or swelling of the lower leg)  ?   When to call your Primary Care Physician   Concerns about your medical conditions such as diabetes, high blood pressure, asthma, congestive heart failure   Call if blood sugars are elevated, if you have a persistent headache or dizziness, coughing or congestion, constipation or diarrhea, burning with urination, abnormal heart rate (fast or slow)  When to call 911 and go to the nearest Emergency Room   Acute onset of chest pain, shortness of breath, difficulty breathing

## 2022-02-09 NOTE — ANESTHESIA POSTPROCEDURE EVALUATION
Post-Anesthesia Evaluation and Assessment    Patient: Rikki Gregorio MRN: [id number]  SSN: [id number]    YOB: 1950  Age: 70 y.o. Sex: male      I have evaluated the patient and they are stable and ready for discharge from the PACU. Cardiovascular Function/Vital Signs  Visit Vitals  /71   Pulse 70   Temp 36.6 °C (97.8 °F)   Resp 27   Wt 85.3 kg (188 lb)   SpO2 100%   BMI 24.14 kg/m²       Patient is status post Spinal anesthesia for Procedure(s):  RIGHT TOTAL HIP ARTHROPLASTY (SPINAL WITH IV SEDATION). Nausea/Vomiting: None    Postoperative hydration reviewed and adequate. Pain:  Pain Scale 1: Numeric (0 - 10) (02/09/22 1615)  Pain Intensity 1: 0 (02/09/22 1615)   Managed    Neurological Status:   Neuro (WDL): Exceptions to WDL (02/09/22 1615)  Neuro  Neurologic State: Alert; Appropriate for age (02/09/22 1615)  LUE Motor Response: Purposeful (02/09/22 1615)  LLE Motor Response: Numbness (d/t spinal block) (02/09/22 1615)  RUE Motor Response: Purposeful (02/09/22 1615)  RLE Motor Response: Numbness (d/t spinal block) (02/09/22 1615)   At baseline    Mental Status, Level of Consciousness: Alert and  oriented to person, place, and time    Pulmonary Status:   O2 Device: None (Room air) (02/09/22 1615)   Adequate oxygenation and airway patent    Complications related to anesthesia: None    Post-anesthesia assessment completed. No concerns    Signed By: Vickie Tyler MD     February 9, 2022              Procedure(s):  RIGHT TOTAL HIP ARTHROPLASTY (SPINAL WITH IV SEDATION). spinal    <BSHSIANPOST>    INITIAL Post-op Vital signs:   Vitals Value Taken Time   /71 02/09/22 1615   Temp 36.6 °C (97.8 °F) 02/09/22 1615   Pulse 61 02/09/22 1623   Resp 13 02/09/22 1623   SpO2 100 % 02/09/22 1623   Vitals shown include unvalidated device data.

## 2022-02-09 NOTE — DISCHARGE SUMMARY
[facility]     DISCHARGE SUMMARY     Name: Yunier Mariano       MR#: [id number]    : 1950  ADMIT DATE: 2022  DISCHARGE DATE: 2/10/2022     ADMISSION DIAGNOSIS: Primary osteoarthritis of right hip [M16.11]  Primary localized osteoarthritis of right hip [M16.11]     DISCHARGE DIAGNOSIS: Primary osteoarthritis of right hip [M16.11]     PROCEDURE PERFORMED: Procedure(s):  RIGHT TOTAL HIP ARTHROPLASTY (SPINAL WITH IV SEDATION)     CONSULTATIONS:  None.     HISTORY OF PRESENT ILLNESS: The patient is a 77-year-old gentleman with progressive right hip and groin pain due to severe osteoarthritis. Symptoms have progressed despite comprehensive conservative treatment. He presents for right total hip replacement. Risks, benefits, and alternatives of procedure were reviewed with him in detail and he desires to proceed.     HOSPITAL COURSE:  The patient underwent the aforementioned procedure on date of admission under spinal anesthesia with adductor canal block. There were no immediate postoperative complications. He was started on a multimodal pain regimen and DVT prophylaxis.     DISPOSITION: The patient made slow, steady progress with physical therapy and was appropriate for discharge to Home in stable condition on postoperative day 1. DISCHARGE MEDICATIONS:  Reinitiate preadmission medications. In addition, the patient will be on asa for DVT prophylaxis and low dose oxycodone and Tylenol for pain. DISCHARGE INSTRUCTIONS:  Detailed printed instructions were provided to the patient. Follow up with Dr. Kaylee Pham in approximately 3 weeks. The patient will receive home health physical therapy in the meantime.     Signed by: Traci Wong PA-C  2022

## 2022-02-09 NOTE — PROGRESS NOTES
1650: Verbal shift change report given to Gena Osborne RN (oncoming nurse) by Tiara Mccabe RN (offgoing nurse). Report included the following information SBAR, Kardex, Intake/Output, MAR and Recent Results. 2000: Bedside and Verbal shift change report given to Meme Singh RN (oncoming nurse) by Gena Osborne RN (offgoing nurse). Report included the following information SBAR, Kardex, MAR and Recent Results.

## 2022-02-09 NOTE — ANESTHESIA PREPROCEDURE EVALUATION
Relevant Problems   CARDIOVASCULAR   (+) MVP (mitral valve prolapse)       Anesthetic History   No history of anesthetic complications            Review of Systems / Medical History  Patient summary reviewed, nursing notes reviewed and pertinent labs reviewed    Pulmonary  Within defined limits                 Neuro/Psych   Within defined limits           Cardiovascular  Within defined limits                Exercise tolerance: >4 METS     GI/Hepatic/Renal  Within defined limits              Endo/Other        Arthritis     Other Findings              Physical Exam    Airway  Mallampati: II  TM Distance: > 6 cm  Neck ROM: normal range of motion   Mouth opening: Normal     Cardiovascular  Regular rate and rhythm,  S1 and S2 normal,  no murmur, click, rub, or gallop             Dental  No notable dental hx       Pulmonary  Breath sounds clear to auscultation               Abdominal  GI exam deferred       Other Findings            Anesthetic Plan    ASA: 2  Anesthesia type: spinal          Induction: Intravenous  Anesthetic plan and risks discussed with: Patient

## 2022-02-10 ENCOUNTER — TELEPHONE (OUTPATIENT)
Dept: INTERNAL MEDICINE CLINIC | Age: 72
End: 2022-02-10

## 2022-02-10 VITALS
SYSTOLIC BLOOD PRESSURE: 137 MMHG | WEIGHT: 188 LBS | DIASTOLIC BLOOD PRESSURE: 67 MMHG | BODY MASS INDEX: 24.14 KG/M2 | HEART RATE: 90 BPM | OXYGEN SATURATION: 97 % | RESPIRATION RATE: 18 BRPM | TEMPERATURE: 98.7 F

## 2022-02-10 LAB
ANION GAP SERPL CALC-SCNC: 7 MMOL/L (ref 5–15)
BUN SERPL-MCNC: 23 MG/DL (ref 6–20)
BUN/CREAT SERPL: 24 (ref 12–20)
CALCIUM SERPL-MCNC: 7.6 MG/DL (ref 8.5–10.1)
CHLORIDE SERPL-SCNC: 107 MMOL/L (ref 97–108)
CO2 SERPL-SCNC: 22 MMOL/L (ref 21–32)
CREAT SERPL-MCNC: 0.95 MG/DL (ref 0.7–1.3)
GLUCOSE SERPL-MCNC: 90 MG/DL (ref 65–100)
HGB BLD-MCNC: 10.3 G/DL (ref 12.1–17)
POTASSIUM SERPL-SCNC: 3.9 MMOL/L (ref 3.5–5.1)
SODIUM SERPL-SCNC: 136 MMOL/L (ref 136–145)

## 2022-02-10 PROCEDURE — 36415 COLL VENOUS BLD VENIPUNCTURE: CPT

## 2022-02-10 PROCEDURE — 97530 THERAPEUTIC ACTIVITIES: CPT | Performed by: PHYSICAL THERAPIST

## 2022-02-10 PROCEDURE — 77030041034 HC KT HIP PATT -B

## 2022-02-10 PROCEDURE — 97165 OT EVAL LOW COMPLEX 30 MIN: CPT

## 2022-02-10 PROCEDURE — 74011250637 HC RX REV CODE- 250/637: Performed by: PHYSICIAN ASSISTANT

## 2022-02-10 PROCEDURE — 97116 GAIT TRAINING THERAPY: CPT | Performed by: PHYSICAL THERAPIST

## 2022-02-10 PROCEDURE — 80048 BASIC METABOLIC PNL TOTAL CA: CPT

## 2022-02-10 PROCEDURE — 85018 HEMOGLOBIN: CPT

## 2022-02-10 PROCEDURE — 97161 PT EVAL LOW COMPLEX 20 MIN: CPT | Performed by: PHYSICAL THERAPIST

## 2022-02-10 PROCEDURE — 74011250636 HC RX REV CODE- 250/636: Performed by: PHYSICIAN ASSISTANT

## 2022-02-10 PROCEDURE — 74011000250 HC RX REV CODE- 250: Performed by: PHYSICIAN ASSISTANT

## 2022-02-10 PROCEDURE — 97110 THERAPEUTIC EXERCISES: CPT | Performed by: PHYSICAL THERAPIST

## 2022-02-10 PROCEDURE — 97535 SELF CARE MNGMENT TRAINING: CPT

## 2022-02-10 PROCEDURE — 97530 THERAPEUTIC ACTIVITIES: CPT

## 2022-02-10 RX ORDER — POLYETHYLENE GLYCOL 3350 17 G/17G
17 POWDER, FOR SOLUTION ORAL DAILY
Qty: 10 PACKET | Refills: 0 | Status: SHIPPED | OUTPATIENT
Start: 2022-02-10 | End: 2022-02-20

## 2022-02-10 RX ORDER — ASPIRIN 81 MG/1
81 TABLET ORAL 2 TIMES DAILY
Qty: 60 TABLET | Refills: 0 | Status: SHIPPED | OUTPATIENT
Start: 2022-02-10 | End: 2022-03-12

## 2022-02-10 RX ORDER — OXYCODONE HYDROCHLORIDE 5 MG/1
2.5-5 TABLET ORAL
Qty: 35 TABLET | Refills: 0 | Status: SHIPPED | OUTPATIENT
Start: 2022-02-10 | End: 2022-02-17

## 2022-02-10 RX ORDER — AMOXICILLIN 250 MG
1 CAPSULE ORAL 2 TIMES DAILY
Qty: 28 TABLET | Refills: 0 | Status: SHIPPED | OUTPATIENT
Start: 2022-02-10

## 2022-02-10 RX ADMIN — WATER 2 G: 1 INJECTION INTRAMUSCULAR; INTRAVENOUS; SUBCUTANEOUS at 04:15

## 2022-02-10 RX ADMIN — POLYETHYLENE GLYCOL 3350 17 G: 17 POWDER, FOR SOLUTION ORAL at 10:03

## 2022-02-10 RX ADMIN — ASPIRIN 81 MG: 81 TABLET, COATED ORAL at 10:03

## 2022-02-10 RX ADMIN — SODIUM CHLORIDE, PRESERVATIVE FREE 10 ML: 5 INJECTION INTRAVENOUS at 04:15

## 2022-02-10 RX ADMIN — KETOROLAC TROMETHAMINE 15 MG: 30 INJECTION, SOLUTION INTRAMUSCULAR; INTRAVENOUS at 10:03

## 2022-02-10 RX ADMIN — SENNOSIDES AND DOCUSATE SODIUM 1 TABLET: 8.6; 5 TABLET ORAL at 10:03

## 2022-02-10 RX ADMIN — ACETAMINOPHEN 500 MG: 500 TABLET ORAL at 10:03

## 2022-02-10 RX ADMIN — ACETAMINOPHEN 500 MG: 500 TABLET ORAL at 06:33

## 2022-02-10 RX ADMIN — KETOROLAC TROMETHAMINE 15 MG: 30 INJECTION, SOLUTION INTRAMUSCULAR; INTRAVENOUS at 04:15

## 2022-02-10 RX ADMIN — ATORVASTATIN CALCIUM 20 MG: 20 TABLET, FILM COATED ORAL at 06:33

## 2022-02-10 RX ADMIN — SODIUM CHLORIDE 125 ML/HR: 9 INJECTION, SOLUTION INTRAVENOUS at 06:36

## 2022-02-10 RX ADMIN — KETOROLAC TROMETHAMINE 15 MG: 30 INJECTION, SOLUTION INTRAMUSCULAR; INTRAVENOUS at 16:14

## 2022-02-10 RX ADMIN — SODIUM CHLORIDE, PRESERVATIVE FREE 10 ML: 5 INJECTION INTRAVENOUS at 14:00

## 2022-02-10 RX ADMIN — ACETAMINOPHEN 500 MG: 500 TABLET ORAL at 14:39

## 2022-02-10 NOTE — DISCHARGE SUMMARY
Ortho Discharge Summary    Patient ID:  Stephani Pineda  [id number]  male  70 y.o.  1950    Admit date: 2/9/2022    Discharge date: 2/10/2022    Admitting Physician: Raciel Elizabeth MD     Consulting Physician(s):   Treatment Team: Attending Provider: Momo Villa MD; Primary Nurse: Branden Goins RN; Care Manager: Jackson Pena RN    Date of Surgery:   2/9/2022     Preoperative Diagnosis:  Primary osteoarthritis of right hip [M16.11]    Postoperative Diagnosis:   Primary osteoarthritis of right hip [M16.11]    Procedure(s):   RIGHT TOTAL HIP ARTHROPLASTY (SPINAL WITH IV SEDATION)     Anesthesia Type:   Spinal     Surgeon: Momo Villa MD                            HPI:  Pt is a 70 y.o. male who has a history of Primary osteoarthritis of right hip [M16.11]  with pain and limitations of activities of daily living who presents at this time for a right RIGHT TOTAL HIP ARTHROPLASTY ([address] [place] [address]) following the failure of conservative management. PMH:   Past Medical History:   Diagnosis Date    Allergic rhinitis, cause unspecified     Arrhythmia     heart murmur    Arthritis     Cancer ([address]     PRE-CANCER SPOTS ON SKIN    Cataract     BILATERAL WITH LENS IMPLANTS    Clavicle fracture     9 YO    Detached retina, bilateral 1990    Hemorrhoids     High cholesterol     Mitral valve prolapse     Wrist fracture     RIGHT WRIST @ 9 YO    Wrist fracture     LEFT WRIST, 10 OR 7 YO       Body mass index is 24.14 kg/m². : A BMI > 30 is classified as obesity and > 40 is classified as morbid obesity. Medications upon admission :   Prior to Admission Medications   Prescriptions Last Dose Informant Patient Reported? Taking?   atorvastatin (LIPITOR) 20 mg tablet 2/9/2022 at 0700  No Yes   Sig: Take 1 Tablet by mouth daily.    Patient taking differently: Take 20 mg by mouth every morning.   multivit-min/ferrous fumarate (MULTI VITAMIN PO) 2/5/2022 at Unknown time  Yes Yes   Sig: Take 1 Tablet by mouth every morning. Facility-Administered Medications: None        Allergies: Allergies   Allergen Reactions    Adhesive Rash     ecg adhesive    Hay Fever And Allergy Relief Unknown (comments)        Hospital Course: The patient underwent surgery. Complications:  None; patient tolerated the procedure well. Was taken to the PACU in stable condition and then transferred to the ortho floor. Perioperative Antibiotics:  Ancef     Postoperative Pain Management:  Oxycodone & Tylenol, Toradol    DVT Prophylaxis: Aspirin 81 mg PO BID    Postoperative transfusions:    Number of units banked PRBCs =   none     Post Op complications: none    Hemoglobin at discharge:    Lab Results   Component Value Date/Time    HGB 10.3 (L) 02/10/2022 04:25 AM    INR 0.9 01/18/2022 09:13 AM       Aquacel dressing remained in place - clean, dry and intact. No significant erythema or swelling. Wound appears to be healing without any evidence of infection. Neurovascular exam found to be within normal limits. Patient ambulating well with therapy clearance on POD 1. Discharged to home with home health. Physical Therapy started following surgery and participated in bed mobility, transfers and ambulation. Gait:  Gait  Base of Support: Widened  Speed/Annabelle: Pace decreased (<100 feet/min),Slow  Step Length: Left shortened,Right shortened  Gait Abnormalities: Decreased step clearance  Ambulation - Level of Assistance: Contact guard assistance  Distance (ft): 250 Feet (ft)  Assistive Device: Gait belt,Walker, rolling  Rail Use: Left  (R SPC)  Stairs - Level of Assistance: Contact guard assistance  Number of Stairs Trained: 10                   Discharged to: Home.     Condition on Discharge:   good    Discharge instructions:  - Anticoagulate with Aspirin 81 mg PO BID  - Take pain medications as prescribed  - Resume pre hospital diet      - Discharge activity: activity as tolerated  - Ambulate with assistive device as needed. - Weight bearing status as tolerated  - Wound Care Keep wound clean and dry. See discharge instruction sheet. -DISCHARGE MEDICATION LIST     Current Discharge Medication List      START taking these medications    Details   aspirin delayed-release 81 mg tablet Take 1 Tablet by mouth two (2) times a day for 30 days. Qty: 60 Tablet, Refills: 0  Start date: 2/10/2022, End date: 3/12/2022      polyethylene glycol (MIRALAX) 17 gram packet Take 1 Packet by mouth daily for 10 days. Qty: 10 Packet, Refills: 0  Start date: 2/10/2022, End date: 2/20/2022      senna-docusate (PERICOLACE) 8.6-50 mg per tablet Take 1 Tablet by mouth two (2) times a day. Qty: 28 Tablet, Refills: 0  Start date: 2/10/2022      oxyCODONE IR (ROXICODONE) 5 mg immediate release tablet Take 0.5-1 Tablets by mouth every four (4) hours as needed for Pain for up to 7 days. Max Daily Amount: 30 mg.  Qty: 35 Tablet, Refills: 0  Start date: 2/10/2022, End date: 2/17/2022    Associated Diagnoses: S/P hip replacement, right         CONTINUE these medications which have NOT CHANGED    Details   multivit-min/ferrous fumarate (MULTI VITAMIN PO) Take 1 Tablet by mouth every morning. atorvastatin (LIPITOR) 20 mg tablet Take 1 Tablet by mouth daily.   Qty: 90 Tablet, Refills: 3    Associated Diagnoses: Elevated coronary artery calcium score; Mixed hyperlipidemia          per medical continuation form      -Follow up in office in 3-4 weeks      Signed:  Marium Onofre NP  Orthopaedic Nurse Practitioner    2/10/2022  3:26 PM

## 2022-02-10 NOTE — PROGRESS NOTES
Patient is alert and oriented x4, vital sign WNL, IV access  removed and clean dressing applied. Patient is being discharged home. Discharge teaching done with family at bedside, and AVS signed by patient.

## 2022-02-10 NOTE — PROGRESS NOTES
Problem: Mobility Impaired (Adult and Pediatric)  Goal: *Acute Goals and Plan of Care (Insert Text)  Description: FUNCTIONAL STATUS PRIOR TO ADMISSION: Patient was independent and active without use of DME.    HOME SUPPORT PRIOR TO ADMISSION: The patient lived with wife but did not require assist.    Physical Therapy Goals  Initiated 2/10/2022    1. Patient will move from supine to sit and sit to supine  in bed with modified independence within 4 days. 2. Patient will perform sit to stand with modified independence within 4 days. 3. Patient will ambulate with modified independence for 250 feet with the least restrictive device within 4 days. 4. Patient will ascend/descend 14 stairs with 1 handrail(s) with modified independence within 4 days. 5. Patient will verbalize and demonstrate understanding of PHILOMENA posterior precautions per protocol within 4 days. 6. Patient will perform hip home exercise program per protocol with modified independence within 4 days. Outcome: Progressing Towards Goal   PHYSICAL THERAPY EVALUATION  Patient: Jose Mccartney (64 y.o. male)  Date: 2/10/2022  Primary Diagnosis: Primary osteoarthritis of right hip [M16.11]  Primary localized osteoarthritis of right hip [M16.11]  Procedure(s) (LRB):  RIGHT TOTAL HIP ARTHROPLASTY (SPINAL WITH IV SEDATION) (Right) 1 Day Post-Op   Precautions: posterior hip precautions,  Fall    ASSESSMENT  Based on the objective data described below, the patient presents with decreased functional mobility from baseline level of function. Currently limited by high anxiety levels, gait instability and decreased activity tolerance. Patient needs frequent reassurance during session secondary to anxiety. Overall supervision to come to EOB with increased time and use of gait belt. CGA for transfers and cues for technique. Amb approx 110 feet with RW and CGA with no overt LOB but very slow rosalva. Will address stairs this PM in prep to [place] home.   Will continue to follow. Other factors to consider for discharge: at risk for falls, below baseline     Patient will benefit from skilled therapy intervention to address the above noted impairments. PLAN :  Recommendations and Planned Interventions: bed mobility training, transfer training, gait training, therapeutic exercises, neuromuscular re-education, edema management/control, and patient and family training/education      Frequency/Duration: Patient will be followed by physical therapy:  twice daily to address goals. Recommendation for discharge: (in order for the patient to meet his/her long term goals)  Physical therapy at least 2 days/week in the home         IF patient discharges home will need the following DME: patient owns DME required for discharge         SUBJECTIVE:   Patient stated I am really nervous and so is my wife.     OBJECTIVE DATA SUMMARY:   HISTORY:    Past Medical History:   Diagnosis Date    Allergic rhinitis, cause unspecified     Arrhythmia     heart murmur    Arthritis     Cancer ([facility] [address]     PRE-CANCER SPOTS ON SKIN    Cataract     BILATERAL WITH LENS IMPLANTS    Clavicle fracture     10 YO    Detached retina, bilateral 1990    Hemorrhoids     High cholesterol     Mitral valve prolapse     Wrist fracture     RIGHT WRIST @ 9 YO    Wrist fracture     LEFT WRIST, 10 OR 10 YO     Past Surgical History:   Procedure Laterality Date    ENDOSCOPY, COLON, DIAGNOSTIC      2000, 2012 , due 22    HX CATARACT REMOVAL Bilateral 1990's    HX HERNIA REPAIR Bilateral 07/2011    BIH    HX ORTHOPAEDIC Left 10/06/2021    NODULE ON WRIST, SURGEON DR. Adam James HEPPER    HX RETINAL DETACHMENT REPAIR Bilateral 1990's    HX WISDOM TEETH EXTRACTION         Personal factors and/or comorbidities impacting plan of care:     Home Situation  Home Environment: Private residence  # Steps to Enter: 8  Rails to Enter: Yes  One/Two Story Residence: Two story  # of Interior Steps: 14  Living Alone: No  Support Systems: Spouse/Significant Other  Patient Expects to be Discharged to[de-identified] Home with home health  Current DME Used/Available at Home: Raised toilet seat,Walker, rolling    EXAMINATION/PRESENTATION/DECISION MAKING:   Critical Behavior:  Neurologic State: Appropriate for age  Orientation Level: Oriented X4  Cognition: Appropriate decision making,Follows commands    Range Of Motion:  AROM: Generally decreased, functional                       Strength:    Strength: Generally decreased, functional          Functional Mobility:  Bed Mobility:     Supine to Sit: Supervision; Additional time;Assist x1;Bed Modified     Scooting: Supervision  Transfers:  Sit to Stand: Contact guard assistance; Additional time (cues for technique)  Stand to Sit: Contact guard assistance                       Balance:   Sitting: Intact  Standing: Impaired  Standing - Static: Constant support;Good  Standing - Dynamic : Constant support; Fair  Ambulation/Gait Training:  Distance (ft): 110 Feet (ft)  Assistive Device: Gait belt;Walker, rolling  Ambulation - Level of Assistance: Contact guard assistance     Gait Description (WDL): Exceptions to WDL  Gait Abnormalities: Decreased step clearance        Base of Support: Widened     Speed/Annabelle: Pace decreased (<100 feet/min); Slow  Step Length: Left shortened;Right shortened          Pain Rating:  Reports pain but does not rate    Activity Tolerance:   Fair and requires rest breaks    After treatment patient left in no apparent distress:   Sitting in chair, Call bell within reach, and Caregiver / family present    COMMUNICATION/EDUCATION:   The patients plan of care was discussed with: Physical therapist, Occupational therapist, and Registered nurse. Fall prevention education was provided and the patient/caregiver indicated understanding., Patient/family have participated as able in goal setting and plan of care. , and Patient/family agree to work toward stated goals and plan of care.     Thank you for this referral.  Carina Mason, PT, DPT   Time Calculation: 38 mins

## 2022-02-10 NOTE — PROGRESS NOTES
Problem: Mobility Impaired (Adult and Pediatric)  Goal: *Acute Goals and Plan of Care (Insert Text)  Description: FUNCTIONAL STATUS PRIOR TO ADMISSION: Patient was independent and active without use of DME.    HOME SUPPORT PRIOR TO ADMISSION: The patient lived with wife but did not require assist.    Physical Therapy Goals  Initiated 2/10/2022    1. Patient will move from supine to sit and sit to supine  in bed with modified independence within 4 days. 2. Patient will perform sit to stand with modified independence within 4 days. 3. Patient will ambulate with modified independence for 250 feet with the least restrictive device within 4 days. 4. Patient will ascend/descend 14 stairs with 1 handrail(s) with modified independence within 4 days. 5. Patient will verbalize and demonstrate understanding of PHILOMENA posterior precautions per protocol within 4 days. 6. Patient will perform hip home exercise program per protocol with modified independence within 4 days. 2/10/2022 1517 by Alivia Query, PT, DPT  Outcome: Progressing Towards Goal  2/10/2022 1217 by Alivia Polk, PT, DPT  Outcome: Progressing Towards Goal   PHYSICAL THERAPY TREATMENT  Patient: Aimee Peoples (65 y.o. male)  Date: 2/10/2022  Diagnosis: Primary osteoarthritis of right hip [M16.11]  Primary localized osteoarthritis of right hip [M16.11] <principal problem not specified>  Procedure(s) (LRB):  RIGHT TOTAL HIP ARTHROPLASTY (SPINAL WITH IV SEDATION) (Right) 1 Day Post-Op  Precautions: Fall, posterior precautions  Chart, physical therapy assessment, plan of care and goals were reviewed. ASSESSMENT  Patient continues with skilled PT services and is progressing towards goals. Patient currently limited by pain but otherwise moving well. Needing CGA for transfers and for ambulation. Amb approx 250 feet with RW and CGA. Able to progress to step through pattern but small step length.  Up/down 10 stairs with L rail and SPC on opposite side with CGA. Reviewed HEP and worked on bed mobility using gait belt. Patient does have some difficulty getting in/out of the bed but wife is present to assist him. Patient is anxious regarding DC and wife is also anxious. Patient has otherwise cleared PT from a mobility standpoint. Other factors to consider for discharge: at risk for falls, below baseline         PLAN :  Patient continues to benefit from skilled intervention to address the above impairments. Continue treatment per established plan of care. to address goals. Recommendation for discharge: (in order for the patient to meet his/her long term goals)  Physical therapy at least 2 days/week in the home       IF patient discharges home will need the following DME: patient owns DME required for discharge       SUBJECTIVE:   Patient stated I know I am not ready to walk normal yet.     OBJECTIVE DATA SUMMARY:   Critical Behavior:  Neurologic State: Alert  Orientation Level: Oriented X4  Cognition: Follows commands  Safety/Judgement: Insight into deficits  Functional Mobility Training:  Bed Mobility:     Supine to Sit: Supervision; Additional time;Assist x1;Bed Modified  Sit to Supine: Minimum assistance;Assist x1  Scooting: Supervision        Transfers:  Sit to Stand: Contact guard assistance  Stand to Sit: Contact guard assistance                             Balance:  Sitting: Intact  Standing: Impaired  Standing - Static: Constant support;Good  Standing - Dynamic : Constant support; Fair  Ambulation/Gait Training:  Distance (ft): 250 Feet (ft)  Assistive Device: Gait belt;Walker, rolling  Ambulation - Level of Assistance: Contact guard assistance     Gait Description (WDL): Exceptions to WDL  Gait Abnormalities: Decreased step clearance        Base of Support: Widened     Speed/Annabelle: Pace decreased (<100 feet/min); Slow  Step Length: Left shortened;Right shortened          Stairs:  Number of Stairs Trained: 10  Stairs - Level of Assistance: Contact guard assistance   Rail Use: Left  (R SPC)    Therapeutic Exercises:   SUPINE  EXERCISES   Sets   Reps   Active Active Assist   Passive Self ROM   Comments   Ankle Pumps 1 10 []                                        []                                        []                                        []                                           Quad Sets 1 10 []                                        []                                        []                                        []                                           Heel Slides 1 10 []                                        []                                        []                                        []                                           Hip Abduction   []                                        []                                        []                                        []                                           Glut Sets 1 10 []                                        []                                        []                                        []                                              []                                        []                                        []                                        []                                              []                                        []                                        []                                        []                                             STANDING  EXERCISES   Sets   Reps   Active Active Assist   Passive Self ROM   Comments   Heel Raises   []                                        []                                        []                                        []                                           Hip Abduction   []                                        []                                        []                                        []                                              [] []                                        []                                        []                                              []                                        []                                        []                                        []                                             Pain Rating:  Reports pain but does not rate    Activity Tolerance:   Fair and requires rest breaks      After treatment patient left in no apparent distress:   Supine in bed and Call bell within reach    COMMUNICATION/COLLABORATION:   The patients plan of care was discussed with: Physical therapist, Occupational therapist, and Registered nurse.      Tennille Renae, PT, DPT   Time Calculation: 40 mins

## 2022-02-10 NOTE — NURSE NAVIGATOR
[facility]  [facility]     FROM:                                TO: At [address]                                                      ([address] [place] [address] or Facility name)  Maia Martini [address]  [address] [place] [address]  Dept: [address]: [phone]                                      Room#:  554/01                                                       Nurse Navigator:  Clint Rincon RN         SITUATION      ASAScore: ASA 2 - Patient with mild systemic disease with no functional limitations    Admitted:  2/9/2022  Hospital Day: 2      Attending Provider:  Ryley Carlin MD     Consultations:  None    PCP:  Sunitha Granado MD   [phone]     Admitting Dx:  Primary osteoarthritis of right hip [M16.11]  Primary localized osteoarthritis of right hip [M16.11]       Active Problems:    Primary localized osteoarthritis of right hip (2/9/2022)      1 Day Post-Op of   Procedure(s):  RIGHT TOTAL HIP ARTHROPLASTY (SPINAL WITH IV SEDATION)   BY: Ryley Carlin MD             ON: 2/9/2022                  Code Status: Full Code             Advance Directive? No Doesnt Have (Send w/patient)     Isolation:  There are currently no Active Isolations       MDRO: No current active infections    BACKGROUND     Allergies:   Allergies   Allergen Reactions    Adhesive Rash     ecg adhesive    Hay Fever And Allergy Relief Unknown (comments)       Past Medical History:   Diagnosis Date    Allergic rhinitis, cause unspecified     Arrhythmia     heart murmur    Arthritis     Cancer (HCC)     PRE-CANCER SPOTS ON SKIN    Cataract     BILATERAL WITH LENS IMPLANTS    Clavicle fracture     10 YO    Detached retina, bilateral 1990    Hemorrhoids     High cholesterol     Mitral valve prolapse     Wrist fracture     RIGHT WRIST @ 9 YO    Wrist fracture     LEFT WRIST, 10 OR 10 YO       Past Surgical History:   Procedure Laterality Date    ENDOSCOPY, COLON, DIAGNOSTIC      2000, 2012 , due 22    HX CATARACT REMOVAL Bilateral 1990's    HX HERNIA REPAIR Bilateral 07/2011    BIH    HX ORTHOPAEDIC Left 10/06/2021    NODULE ON WRIST, SURGEON DR. John Jensen HEPPER    HX RETINAL DETACHMENT REPAIR Bilateral 1990's    HX WISDOM TEETH EXTRACTION         Prior to Admission Medications   Prescriptions Last Dose Informant Patient Reported? Taking?   atorvastatin (LIPITOR) 20 mg tablet 2/9/2022 at 0700  No Yes   Sig: Take 1 Tablet by mouth daily. Patient taking differently: Take 20 mg by mouth every morning.   multivit-min/ferrous fumarate (MULTI VITAMIN PO) 2/5/2022 at Unknown time  Yes Yes   Sig: Take 1 Tablet by mouth every morning. Facility-Administered Medications: None       Vaccinations:    Immunization History   Administered Date(s) Administered    COVID-19, Moderna, Primary or Immunocompromised Series, MRNA, PF, 100mcg/0.5mL 01/28/2021, 02/25/2021, 11/16/2021    Influenza High Dose Vaccine PF 12/02/2015, 01/03/2018    Influenza Vaccine 11/05/2019, 09/29/2021    Influenza Vaccine (Quad) PF (>6 Mo Flulaval, Fluarix, and >3 Yrs Afluria, Fluzone 23112) 09/25/2014    Influenza Vaccine Split 11/07/2011, 11/08/2012    Influenza, Quadrivalent, Adjuvanted (>65 Yrs FLUAD QUAD [id number]) 10/29/2020    Pneumococcal Conjugate (PCV-13) 12/30/2016    Pneumococcal Polysaccharide (PPSV-23) 11/12/2018    TDAP Vaccine 11/07/2011    Tdap 09/17/2014    Zoster 11/15/2011         ASSESSMENT   Age: 70 y.o.              Gender: male        Height:                      Weight:Weight: 85.3 kg (188 lb)     Patient Vitals for the past 8 hrs:   Temp Pulse Resp BP SpO2   02/10/22 1001 98 °F (36.7 °C) 96 16 128/71 96 %            Active Orders   Diet    ADULT DIET Regular       Orientation: Orientation Level: Oriented X4    Active Lines/Drains:  (Peg Tube / [place] / CL or S/L?):no    Urinary Status: Voiding      Last BM: Last Bowel Movement Date: 02/09/22     Skin Integrity: Incision (comment) (Right hip)             Mobility: Slightly limited   Weight Bearing Status: WBAT (Weight Bearing as Tolerated)      Gait Training  Assistive Device: Gait belt,Walker, rolling  Ambulation - Level of Assistance: Contact guard assistance  Distance (ft): 250 Feet (ft)  Stairs - Level of Assistance: Contact guard assistance  Number of Stairs Trained: 10  Rail Use: Left  (R SPC)     On Anticoagulation? YES  Aspirin                                         Pain Medications given:  oxycodone                                   Lab Results   Component Value Date/Time    Glucose 90 02/10/2022 04:25 AM    Hemoglobin A1c 5.6 12/20/2021 09:09 AM    INR 0.9 01/18/2022 09:13 AM    HGB 10.3 (L) 02/10/2022 04:25 AM    HGB 15.4 12/20/2021 09:09 AM    HGB 14.4 12/10/2020 08:47 AM    HGB 14.0 11/19/2019 11:34 AM       Readmission Risks:  Score:         RECOMMENDATION     See After Visit Summary (AVS) for:  · Discharge instructions  · After [address]   · Medication Reconciliation          [facility] Orthopaedic Nurse Navigator  LYNDSEY Antonio, RN-BC       Office  [phone]  Cell      [phone]  Fax      [phone]  [email or web address]             . Artem

## 2022-02-10 NOTE — OP NOTES
[address] [facility]  OPERATIVE REPORT    Name:  Nory Garay  MR#:  [id number]  :  1950  ACCOUNT #:  [de-identified]  DATE OF SERVICE:  2022      PREOPERATIVE DIAGNOSIS:  Osteoarthritis, right hip. POSTOPERATIVE DIAGNOSIS:  Osteoarthritis, right hip    PROCEDURE PERFORMED:  Right total hip arthroplasty. SURGEON:  Gayatri Floyd MD    FIRST ASSISTANT:  Ramez Vila PA-C    ANESTHESIA:  Spinal with sedation. COMPLICATIONS:  None. SPECIMENS REMOVED:  None. IMPLANTS:  Components implanted, DonJoy 58-mm Empowr acetabular shell with 1 cancellous screw and 40-mm inside diameter neutral polyethylene liner, size 13 standard offset TaperFill femoral stem with 40-mm +0 ceramic femoral head. ESTIMATED BLOOD LOSS:  250 mL. INDICATIONS:  The patient is a 66-year-old gentleman with progressive right hip and groin pain due to severe osteoarthritis. Symptoms have progressed despite comprehensive conservative treatment. He presents for right total hip replacement. Risks, benefits, and alternatives of procedure were reviewed with him in detail and he desires to proceed. PROCEDURE IN DETAIL:  The patient was taken to the operating room where Anesthesia Team placed a spinal.  Preoperative IV antibiotics were administered. He was turned to left lateral decubitus position on a Murray frame hip positioner. All bony prominences were well padded and an axillary roll was placed. Right hip and thigh were prepped and draped in the usual sterile fashion. Through a lateral hip incision, I performed a posterior approach to the right hip. Short rotators and posterior capsule were reflected posteriorly. Leg lengths were compared on the table for comparison with trial reduction later during the procedure. The hip was dislocated and femoral neck osteotomy was performed. Acetabular retractors were placed. The labrum was completely calcified and was removed with an osteotome. Acetabulum was reamed with hemispherical reamers up to 57 mm before impacting a 58-mm Empowr acetabular shell. Intrinsic stability was satisfactory that was augmented with 1 cancellous screw. 40-mm trial liner was placed. Femur was prepared with TaperFill broaches up to size 13 before achieving rotational stability. Calcar was planed. Based on native anatomy, hip was reduced with a standard offset neck trial, 40-mm +0 head trial produced satisfactory leg length and soft tissue tension. Hip was stable to full extension, external rotation with no internal impingement, stable to straight hyperflexion, and with the hip flexed 90 degrees in neutral abduction, there was greater than 70-80 degrees of internal rotation. Trials were removed. The wound was copiously irrigated by pulse lavage before the real components were implanted. Same leg length and stability were achieved. Periarticular soft tissues were injected with solution containing 0.5% ropivacaine with epinephrine as well as clonidine and Toradol. Due to preoperative external rotation contracture, formal repair of the posterior capsule could not be performed to the inner aspect of posterior greater trochanter. I was able to repair the capsule to the posterior aspect of the gluteus minimus to cover the posterior aspect of the femoral head. Short rotators were able to be repaired to the inner aspect of posterior greater trochanter through drill holes using #2 Ethibond sutures. Deep fascia was closed with a combination of heavy Vicryl sutures and a running #2 Stratafix suture. Skin and subcutaneous layers were closed in layered fashion with Vicryl and running Monocryl subcuticular stitch. The wound was dressed with Dermabond and Aquacel occlusive dressing. The patient's bladder was decompressed with straight catheterization before he was transported to the postanesthesia care unit in stable condition.   All counts were correct at the end of the procedure. The physician assistant was critical throughout the case to assist with positioning, retraction, and closure. There were no other available residents, fellows, or surgical assistants available to assist during this procedure. Liyah Goff MD      [id number]  D:  02/09/2022 15:34  T:  02/10/2022 1:10  JOB #:  [id number]  CC:   MD Nancy Weldon MD

## 2022-02-10 NOTE — PROGRESS NOTES
Was very in the evening during ambulation; scared of hip popping out. Patient reassured that with proper precaution he should be ok. Refused narcotics; only wanted tylenol and Toradol.

## 2022-02-10 NOTE — PROGRESS NOTES
Problem: Self Care Deficits Care Plan (Adult)  Goal: *Acute Goals and Plan of Care (Insert Text)  Description: FUNCTIONAL STATUS PRIOR TO ADMISSION: Patient was independent and active without use of DME.    HOME SUPPORT: The patient lived with his spouse but did not require assist.    Occupational Therapy Goals  Initiated 2/10/2022  1. Patient will perform lower body ADLs with AE supervision/set-up within 4 day(s). 2.  Patient will perform upper body ADLs standing 5 mins without fatigue or LOB with supervision/set-up within 4 day(s). 3.  Patient will perform toilet transfer with supervision/set-up within 4 day(s). 4.  Patient will perform all aspects of toileting with supervision/set-up within 4 day(s). 5.  Patient will verbalize and demonstrate 3/3 hip precautions with independence within 4 day(s). 6.  Patient will utilize energy conservation techniques during functional activities without cues within 4 day(s). Outcome: Not Met   OCCUPATIONAL THERAPY EVALUATION  Patient: Laz Leo (24 y.o. male)  Date: 2/10/2022  Primary Diagnosis: Primary osteoarthritis of right hip [M16.11]  Primary localized osteoarthritis of right hip [M16.11]  Procedure(s) (LRB):  RIGHT TOTAL HIP ARTHROPLASTY (SPINAL WITH IV SEDATION) (Right) 1 Day Post-Op   Precautions:  Fall, posterior hip precautions     ASSESSMENT  Based on the objective data described below, the patient presents with impaired balance, decreased activity tolerance/endurance, increased anxiety regarding hip precautions/ambulation/ADL task completion, and decreased LB reach s/p R PHILOMENA POD1. Pt performed UB/LB dressing tasks while seated in bedside chair with CGA, use of AE, and additional time allotted. Pt instructed on posterior hip precautions and integration into ADL routines. At this time recommend d/c home with HHOT and assist/supervision with OOB mobility/ADL/IADL tasks.      Current Level of Function Impacting Discharge (ADLs/self-care): CGA seated LB tasks with AE, CGA toileting and standing grooming     Functional Outcome Measure: The patient scored 55/100 on the Barthel Index outcome measure which is indicative of partial dependence in basic self care. Other factors to consider for discharge: PLOF independent, lives with spouse      Patient will benefit from skilled therapy intervention to address the above noted impairments. PLAN :  Recommendations and Planned Interventions: self care training, functional mobility training, therapeutic exercise, balance training, therapeutic activities, endurance activities, patient education and home safety training    Frequency/Duration: Patient will be followed by occupational therapy 5 times a week to address goals. Recommendation for discharge: (in order for the patient to meet his/her long term goals)  Occupational therapy at least 2 days/week in the home AND ensure assist and/or supervision for safety with OOB mobility/ADL/IADL tasks    This discharge recommendation:  Has been made in collaboration with the attending provider and/or case management    IF patient discharges home will need the following DME: patient owns DME required for discharge, hip kit delivered to pt       SUBJECTIVE:   Patient stated I have to remember all of my instructions while I move around.  (regarding hip precautions)    OBJECTIVE DATA SUMMARY:   HISTORY:   Past Medical History:   Diagnosis Date    Allergic rhinitis, cause unspecified     Arrhythmia     heart murmur    Arthritis     Cancer ([facility] [address]     PRE-CANCER SPOTS ON SKIN    Cataract     BILATERAL WITH LENS IMPLANTS    Clavicle fracture     10 YO    Detached retina, bilateral 1990    Hemorrhoids     High cholesterol     Mitral valve prolapse     Wrist fracture     RIGHT WRIST @ 9 YO    Wrist fracture     LEFT WRIST, 10 OR 10 YO     Past Surgical History:   Procedure Laterality Date    ENDOSCOPY, COLON, DIAGNOSTIC      2000, 2012 , due 22    HX CATARACT REMOVAL Bilateral 1990's    HX HERNIA REPAIR Bilateral 07/2011    BIH    HX ORTHOPAEDIC Left 10/06/2021    NODULE ON WRIST, SURGEON DR. Holliday Wood HEPPER    HX RETINAL DETACHMENT REPAIR Bilateral 1990's    HX WISDOM TEETH EXTRACTION         Expanded or extensive additional review of patient history:     Home Situation  Home Environment: Private residence  # Steps to Enter: 8  Rails to Enter: Yes  One/Two Story Residence: Two story  # of Interior Steps: 14  Living Alone: No  Support Systems: Spouse/Significant Other  Patient Expects to be Discharged to[de-identified] Home with home health  Current DME Used/Available at Home: Raised toilet seat,Walker, rolling  Tub or Shower Type: Shower    Hand dominance: Right    EXAMINATION OF PERFORMANCE DEFICITS:  Cognitive/Behavioral Status:  Neurologic State: Alert  Orientation Level: Oriented X4  Cognition: Follows commands  Perception: Appears intact  Perseveration: No perseveration noted  Safety/Judgement: Insight into deficits    Skin: visually intact    Edema: none noted     Hearing: Auditory  Auditory Impairment: None    Vision/Perceptual:                           Acuity: Within Defined Limits    Corrective Lenses: Reading glasses    Range of Motion:    AROM: Generally decreased, functional                         Strength:    Strength: Generally decreased, functional                Coordination:  Coordination: Generally decreased, functional  Fine Motor Skills-Upper: Left Intact; Right Intact    Gross Motor Skills-Upper: Left Intact; Right Intact    Tone & Sensation:    Tone: Normal  Sensation: Intact                      Balance:  Sitting: Intact  Standing: Impaired  Standing - Static: Constant support;Good  Standing - Dynamic : Constant support; Fair    Functional Mobility and Transfers for ADLs:  Bed Mobility:  Supine to Sit: Supervision; Additional time;Assist x1;Bed Modified  Sit to Supine:  (pt remained in bedside chair)  Scooting: Supervision    Transfers:  Sit to Stand: Contact guard assistance; Additional time (cues for technique)  Stand to Sit: Contact guard assistance  Bathroom Mobility: Contact guard assistance (additional time)  Toilet Transfer : Contact guard assistance; Additional time; Adaptive equipment (grab bars and RW)    ADL Assessment:  Patient recalled and demonstrated avoiding extreme planes of movement with Right LE during ADLs and functional mobility with verbal cues. Feeding: Independent    Oral Facial Hygiene/Grooming: Contact guard assistance (standing )    Bathing: Contact guard assistance (for transfer and LB reach)    Upper Body Dressing: Independent    Lower Body Dressing: Contact guard assistance; Adaptive equipment; Additional time    Toileting: Contact guard assistance; Additional time; Adaptive equipment (grab bars and RW)                ADL Intervention and task modifications:         Grooming  Position Performed: Standing  Washing Hands: Contact guard assistance              Upper Body Dressing Assistance  Pullover Shirt: Independent  Front Opened Shirt: Independent    Lower Body Dressing Assistance  Underpants: Contact guard assistance  Pants With Elastic Waist: Contact guard assistance  Slip on Shoes with Back: Stand-by assistance  Position Performed: Seated in chair  Cues: Verbal cues provided  Adaptive Equipment Used: Long handled shoe horn;Reacher    Toileting  Toileting Assistance: Contact guard assistance  Clothing Management: Contact guard assistance    Cognitive Retraining  Safety/Judgement: Insight into deficits    Bathing: Patient instructed when bathing to not submerge wound in water, stand to shower or sponge bathe, cover wound with plastic and tape to ensure no water reaches bandage/wound without cues. Patient indicated understanding. Dressing joint: Patient instructed and demonstrated to don/doff Right LE first/last verbal cues.   Patient instructed and demonstrated to don all clothing while sitting prior to standing, doff all clothing to knees while standing, then sit to doff clothing off from knees to feet in order to facilitate fall prevention, pain management, and energy conservation with Contact guard assistance. Home safety: Patient instructed on home modifications and safety (raise height of ADL objects, appropriate height of chair surfaces, recliner safety, change of floor surfaces, clear pathways) to increase independence and fall prevention. Patient indicated understanding. Standing: Patient instructed and demonstrated to walk up to sink/counter top/surfaces, step into walker to increase safety of joint and fall prevention with Contact guard assistance. Instructed to apply concept of hip contraindications to ADLs within the home (no extreme reaching across body to Right side, square off while using objects, slide objects along surfaces). Patient instructed to increase amount of time standing, observe standing position during ADLs in order to increase even weight bearing through bilateral LEs in order to increase independence with ADLs. Goal to be reached 30 days post - op, per orthopedic surgeon or per PT. Patient indicated understanding. Tub transfer: Patient instructed regarding when it is safe to begin transfer into tub (complete stairs with PT, advance exercises with PT high enough to clear tub height). Patient instructed to use the same technique as used with stairs when entering and exiting tub (\"up with the non-surgical, down with the surgical leg\"). Patient indicated understanding. Functional Measure:    Barthel Index:  Bathin  Bladder: 10  Bowels: 10  Groomin  Dressin  Feeding: 10  Mobility: 0  Stairs: 0  Toilet Use: 5  Transfer (Bed to Chair and Back): 10  Total: 55/100      The Barthel ADL Index: Guidelines  1. The index should be used as a record of what a patient does, not as a record of what a patient could do.   2. The main aim is to establish degree of independence from any help, physical or verbal, however minor and for whatever reason. 3. The need for supervision renders the patient not independent. 4. A patient's performance should be established using the best available evidence. Asking the patient, friends/relatives and nurses are the usual sources, but direct observation and common sense are also important. However direct testing is not needed. 5. Usually the patient's performance over the preceding 24-48 hours is important, but occasionally longer periods will be relevant. 6. Middle categories imply that the patient supplies over 50 per cent of the effort. 7. Use of aids to be independent is allowed. Score Interpretation (from [address] [facility])    Independent   60-79 Minimally independent   40-59 Partially dependent   20-39 Very dependent   <20 Totally dependent     -Ralf Mcdaniels., Barthel, DInesW. (1965). Functional evaluation: the Barthel Index. [facility] ([address] (1997). The Barthel activities of daily living index: self-reporting versus actual performance in the old (> or = 75 years). Journal [address] 45(7), [facility], [facility], [facility]., Edwar Reap. (1999). Measuring the change in disability after inpatient rehabilitation; comparison of the responsiveness of the Barthel Index and Functional Leake Measure. Journal of Neurology, Neurosurgery, and Psychiatry, 66(4), 409-819. Antonetteotis JOLANTA Alan, MAO Hancock, & Gosia Anna, M.A. (2004) Assessment of post-stroke quality of life in cost-effectiveness studies: The usefulness of the Barthel Index and the EuroQoL-5D.  Quality of Life Research, 15, 399-80         Occupational Therapy Evaluation Charge Determination   History Examination Decision-Making   LOW Complexity : Brief history review  LOW Complexity : 1-3 performance deficits relating to physical, cognitive , or psychosocial skils that result in activity limitations and / or participation restrictions  MEDIUM Complexity : Patient may present with comorbidities that affect occupational performnce. Miniml to moderate modification of tasks or assistance (eg, physical or verbal ) with assesment(s) is necessary to enable patient to complete evaluation       Based on the above components, the patient evaluation is determined to be of the following complexity level: LOW   Pain Ratin/10 at rest    Activity Tolerance:   Good    After treatment patient left in no apparent distress:    Sitting in chair, Call bell within reach and Caregiver / family present    COMMUNICATION/EDUCATION:   The patients plan of care was discussed with: Registered nurse. Patient/family have participated as able in goal setting and plan of care. and Patient/family agree to work toward stated goals and plan of care. This patients plan of care is appropriate for delegation to ANDERSON.     Thank you for this referral.  Pop Fields OT  Time Calculation: 45 mins

## 2022-02-10 NOTE — PROGRESS NOTES
Ortho NP Note    POD# 1  s/p RIGHT TOTAL HIP ARTHROPLASTY (SPINAL WITH IV SEDATION)   Pt seen with Dr Kevin Adkins    Pt resting in bed. Reports pain with movement in bed. Has not had PT session yet but has been up in room with unit staff. No N/V, CP, SOB      VSS Afebrile. Visit Vitals  /65 (BP 1 Location: Right upper arm, BP Patient Position: At rest)   Pulse 81   Temp 97.7 °F (36.5 °C)   Resp 16   Wt 85.3 kg (188 lb)   SpO2 96%   BMI 24.14 kg/m²       Voiding status: spontaneous  Output (mL)  Urine Voided: 175 ml (02/10/22 0638)  Last Bowel Movement Date: 02/09/22 (02/09/22 2041)      Labs    Lab Results   Component Value Date/Time    HGB 10.3 (L) 02/10/2022 04:25 AM      Lab Results   Component Value Date/Time    INR 0.9 01/18/2022 09:13 AM      Lab Results   Component Value Date/Time    Sodium 136 02/10/2022 04:25 AM    Potassium 3.9 02/10/2022 04:25 AM    Chloride 107 02/10/2022 04:25 AM    CO2 22 02/10/2022 04:25 AM    Glucose 90 02/10/2022 04:25 AM    BUN 23 (H) 02/10/2022 04:25 AM    Creatinine 0.95 02/10/2022 04:25 AM    Calcium 7.6 (L) 02/10/2022 04:25 AM     Recent Glucose Results:   Lab Results   Component Value Date/Time    GLU 90 02/10/2022 04:25 AM    GLUCPOC 93 02/09/2022 11:34 AM           Body mass index is 24.14 kg/m². : A BMI > 30 is classified as obesity and > 40 is classified as morbid obesity. Aquacel dressing to R hip c.d.i  Cryotherapy in place over incision  Calves soft and supple; No pain with passive stretch  Bilateral LEs warm, dry. 2+ DP pulses. Sensation and motor intact - PF/DF/EHL intact 5/5  SCDs for mechanical DVT proph while in bed     PLAN:  1) PT: BID WBAT  2) Anticoagulation:  Aspirin 81 mg PO BID for DVT Prophylaxis. Encouraged early mobilization, bed exercises, and SCD use. 3) Pain - Multimodal approach including cryotherapy, scheduled Tylenol & Toradol with  PRN oxycodone & IV dilaudid.     4) Hx of HLD: continue home atorvastatin   5) Readniess for discharge:     [x] Vital Signs stable    [x] Hgb stable-- currently 10.3    [x] + Voiding    [x] Wound intact, drainage minimal    [x] Tolerating PO intake     [] Cleared by PT (OT if applicable)     [] Stair training completed (if applicable)    [] Independent / Contact Guard Assist (household distance)     [] Bed mobility     [] Car transfers     [] ADLs    [] Adequate pain control on oral medication alone     Likely discharge to home with HHPT today pending PT clearance and pain management  Sivakumar Patino NP  Available via [employer]

## 2022-02-10 NOTE — PROGRESS NOTES
MAMADOU: The patient plans to discharge home with [facility] and wife to transport when stable for discharge. RUR: N/A    1. The patient is from home and lives with wife. 2. Orthopedics, PT/OT following. 3. The patient plans to discharge home. Medicare Outpatient Observation Notice (MOON)/ [place] Outpatient Observation Notice ([place]) provided to patient/representative with verbal explanation of the notice. Time allotted for questions regarding the notice. Patient /representative provided a completed copy of the MOON/[facility] notice. Copy placed on bedside chart. Care Management Interventions  PCP Verified by CM: Yes  Palliative Care Criteria Met (RRAT>21 & CHF Dx)?: No  Mode of Transport at Discharge: Other (see comment)  Transition of Care Consult (CM Consult): [address]: No  Reason Outside [place]: Physician referred to specific agency  MyChart Signup: Yes  Discharge Durable Medical Equipment: No  Health Maintenance Reviewed: Yes  Physical Therapy Consult: Yes  Occupational Therapy Consult: Yes  Speech Therapy Consult: No  Support Systems: Spouse/Significant Other  Confirm Follow Up Transport: Family  The Plan for Transition of Care is Related to the Following Treatment Goals : The patient plans to discharge home with home health. The Patient and/or Patient Representative was Provided with a Choice of Provider and Agrees with the Discharge Plan?: Yes  Freedom of Choice List was Provided with Basic Dialogue that Supports the Patient's Individualized Plan of Care/Goals, Treatment Preferences and Shares the Quality Data Associated with the Providers?: Yes   Resource Information Provided?: No  Discharge Location  Patient Expects to be Discharged to[de-identified] Home with home health     Reason for Admission:  Right Total Hip                     RUR Score:  N/A                   Plan for utilizing home health:       The Plan for Transition of Care is related to the following treatment goals: Home Health    The Patient and/or patient representative Mily Haley was provided with a choice of provider and agrees   with the discharge plan. [x] Yes [] No    Freedom of choice list was provided with basic dialogue that supports the patient's individualized plan of care/goals, treatment preferences and shares the quality data associated with the providers. [x] Yes [] No        PCP: First and Last name:  Houston Mchugh MD, phone: [phone]     Name of Practice:    Are you a current patient: Yes/No: Yes   Approximate date of last visit: Jan, 2022   Can you participate in a virtual visit with your PCP:                     Current Advanced Directive/Advance Care Plan: Full Code      Healthcare Decision Maker:   Click here to complete [address] including selection of the Healthcare Decision Maker Relationship (ie \"Primary\")                             Transition of Care Plan:       CM met with the patient in room 554. The patient is alert and oriented x4. Confirmed demographics. Prior to surgery, the patient was independent with ADL's/IADL's, drives a vehicle, owns a rolling walker and uses [address] at [place]. The patient plans to discharge home with home health and wife to transport. CM following for discharge needs.     Clementine Lackey RN/CRM

## 2022-02-10 NOTE — TELEPHONE ENCOUNTER
Reason for call:  Fax also [phone] last notes at [address] [employer]     Is this a new problem: yes     Date of last appointment:  1/19/2022     Can we respond via [employer]: no    Best call back number [phone] [facility]

## 2022-02-15 ENCOUNTER — PATIENT OUTREACH (OUTPATIENT)
Dept: CASE MANAGEMENT | Age: 72
End: 2022-02-15

## 2022-02-15 NOTE — PROGRESS NOTES
Post Discharge Follow-up contact after Joint Replacement    Patient discharged on 2/10/22  By  Cheng Veloz   following  right hip Arthroplasty. Spoke with patient today, who reports that \" I am still having swelling in my right thigh and lower leg. Elevation and ice helps, but it is still noticeable. \"  Denies Fever, Shortness of Breath or Chest Pain. Home Health has visited. Patient also reports:  Surgical dressing clean, dry, intact  Calf is non-tender, operative extremity has moderate swelling. Pain is well managed. Discussed use of ice & elevation. Patient is progressing with therapy and is exercising independently. Taking Aspirin for anticoagulation, Tylenol for pain (trying to stay away from the oxycodone today). Patient is not experiencing symptoms of constipation & urinating without difficulty. Reports frequency with his increased fluid intake. Discussed side effects of anticoagulants & pain medications (bleeding/bruising, constipation, lightheaded/dizziness)  Follow up appointment is scheduled for 3/3/22. Discussed calling surgeon Dr Gregg tyler  for drainage, bleeding, swelling in operative extremity, fever or pain. Discussed calling PCP Dr Rachel Kramer with other medical issues.

## 2022-03-03 ENCOUNTER — OFFICE VISIT (OUTPATIENT)
Dept: ORTHOPEDIC SURGERY | Age: 72
End: 2022-03-03

## 2022-03-03 VITALS — BODY MASS INDEX: 24.13 KG/M2 | WEIGHT: 188 LBS | HEIGHT: 74 IN

## 2022-03-03 DIAGNOSIS — Z09 SURGERY FOLLOW-UP: ICD-10-CM

## 2022-03-03 DIAGNOSIS — Z96.641 STATUS POST TOTAL REPLACEMENT OF RIGHT HIP: Primary | ICD-10-CM

## 2022-03-03 PROCEDURE — 99024 POSTOP FOLLOW-UP VISIT: CPT | Performed by: PHYSICIAN ASSISTANT

## 2022-03-03 NOTE — PROGRESS NOTES
Dona Reyez (: 1950) is a 70 y.o. male, patient, here for evaluation of the following chief complaint(s):  Surgical Follow-up ([facility] 9 #1 F/U)       SUBJECTIVE/OBJECTIVE:  Dona Reyez presents today for first postop visit status post right total hip arthroplasty on 2022. He finished with home health physical therapy. Using his cane for ambulation. Only taking Tylenol for pain. Still taking aspirin as prescribed for DVT prophylaxis. Pain well controlled other than the sharp pains with twisting motion. Difficulty sleeping at night, trouble finding a comfortable position. PHYSICAL EXAM:  Vitals: Ht 6' 2\" (1.88 m)   Wt 188 lb (85.3 kg)   BMI 24.14 kg/m²   Body mass index is 24.14 kg/m². 70y.o. year old M in no acute distress. Ambulates with a normal gait pattern, cane in the contralateral hand. Right hip incision well-healing, no warmth, erythema, drainage. No pain with forward flexion of the right hip. Motor 5/5. No distal edema. IMAGING:  Radiographs:   XR Results (most recent):  Results from Appointment encounter on 22    XR HIP RT W OR WO PELV 2-3 VWS    Narrative  3 views of the right hip today including AP pelvis, AP and frog lateral using digital radiography demonstrate satisfactory position and alignment of cementless total hip components. No changes compared to immediate postoperative x-rays. ASSESSMENT/PLAN:  1. Status post total replacement of right hip  -     XR HIP RT W OR WO PELV 2-3 VWS; Future  -     REFERRAL TO PHYSICAL THERAPY  2. Surgery follow-up    First postop visit status post right total hip arthroplasty on 2022. Transition outpatient physical therapy, prescription provided. We discussed range of motion and activity restrictions today. Continue cane use for another week or 2, wean as able. Continue Tylenol as needed. Once aspirin is complete, discontinue. Follow-up in 4 weeks for clinical recheck, sooner if needed.    Return in about 4 weeks (around 3/31/2022) for POV #2 with Dr. Alonzo Miller. Review Of Systems  ROS     Positive for: Skin, Musculoskeletal    Last edited by Magalis Doshi RN on 3/3/2022  2:08 PM. (History)         Patient denies any recent fever, chills, nausea, vomiting, chest pain, or shortness of breath. Allergies   Allergen Reactions    Adhesive Rash     ecg adhesive    Hay Fever And Allergy Relief Unknown (comments)       Current Outpatient Medications   Medication Sig    multivit-min/ferrous fumarate (MULTI VITAMIN PO) Take 1 Tablet by mouth every morning.  atorvastatin (LIPITOR) 20 mg tablet Take 1 Tablet by mouth daily. (Patient taking differently: Take 20 mg by mouth every morning.)    aspirin delayed-release 81 mg tablet Take 1 Tablet by mouth two (2) times a day for 30 days.  senna-docusate (PERICOLACE) 8.6-50 mg per tablet Take 1 Tablet by mouth two (2) times a day. No current facility-administered medications for this visit.        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 Brandy Brian HEPPER    HX RETINAL DETACHMENT REPAIR Bilateral 1990's    HX WISDOM TEETH EXTRACTION         Family History   Problem Relation Age of Onset    Hypertension Father     Diabetes Father     Stroke Maternal Aunt         cerebral aneurysm    Psychiatric Disorder Paternal Uncle         alzheimers    Cancer Paternal Uncle     Hypertension Mother     Diabetes Mother         BORDERLINE    Kidney Disease Mother     No Known Problems Sister     Cancer Cousin         PROSTATE    No Known Problems Son     No Known Problems Son     Anesth Problems Neg Hx         Social History     Socioeconomic History    Marital status:      Spouse name: Not on file    Number of children: Not on file    Years of education: Not on file    Highest education level: Not on file   Occupational History    Not on file   Tobacco Use    Smoking status: Never Smoker    Smokeless tobacco: Never Used   Vaping Use    Vaping Use: Never used   Substance and Sexual Activity    Alcohol use: Yes     Alcohol/week: 2.0 standard drinks     Types: 1 Glasses of wine, 1 Cans of beer per week     Comment: occasionally    Drug use: No    Sexual activity: Not on file   Other Topics Concern    Not on file   Social History Narrative    Not on file     Social Determinants of Health     Financial Resource Strain:     Difficulty of Paying Living Expenses: Not on file   Food Insecurity:     Worried About Running Out of Food in the Last Year: Not on file    Lore of Food in the Last Year: Not on file   Transportation Needs:     Lack of Transportation (Medical): Not on file    Lack of Transportation (Non-Medical):  Not on file   Physical Activity:     Days of Exercise per Week: Not on file    Minutes of Exercise per Session: Not on file   Stress:     Feeling of Stress : Not on file   Social Connections:     Frequency of Communication with Friends and Family: Not on file    Frequency of Social Gatherings with Friends and Family: Not on file    Attends Tenriism Services: Not on file    Active Member of Clubs or Organizations: Not on file    Attends Club or Organization Meetings: Not on file    Marital Status: Not on file   Intimate Partner Violence:     Fear of Current or Ex-Partner: Not on file    Emotionally Abused: Not on file    Physically Abused: Not on file    Sexually Abused: Not on file   Housing Stability:     Unable to Pay for Housing in the Last Year: Not on file    Number of Places Lived in the Last Year: Not on file    Unstable Housing in the Last Year: Not on file         Orders Placed This Encounter    XR HIP RT W OR WO PELV 2-3 VWS     Standing Status:   Future     Number of Occurrences:   1     Standing Expiration Date:   3/4/2023    REFERRAL TO PHYSICAL THERAPY     Referral Priority:   Routine     Referral Type:   PT/OT/ST     Referral Reason:   Specialty Services Required     Number of Visits Requested:   [address] Kevin Adkins M.D. was available for immediate consultation as the supervising physician. An electronic signature was used to authenticate this note.   -- Elfida Brittle, PA-C

## 2022-03-03 NOTE — LETTER
3/3/2022    Patient: Aimee Peoples   YOB: 1950   Date of Visit: 3/3/2022     Ngoc Gonzalez MD  [address] [facility]  [address] [address] [facility] [phone]  Via In Basket    Dear Ngoc Gonzalez MD,      Thank you for referring Mr. Gris Dominguez to [facility] for evaluation. My notes for this consultation are attached. If you have questions, please do not hesitate to call me. I look forward to following your patient along with you.       Sincerely,    Puneet Ramos PA-C

## 2022-03-15 ENCOUNTER — HOSPITAL ENCOUNTER (OUTPATIENT)
Dept: PHYSICAL THERAPY | Age: 72
Discharge: HOME | End: 2022-03-15
Payer: MEDICARE

## 2022-03-15 PROCEDURE — 97016 VASOPNEUMATIC DEVICE THERAPY: CPT | Performed by: PHYSICAL THERAPIST

## 2022-03-15 PROCEDURE — 97110 THERAPEUTIC EXERCISES: CPT | Performed by: PHYSICAL THERAPIST

## 2022-03-15 PROCEDURE — 97161 PT EVAL LOW COMPLEX 20 MIN: CPT | Performed by: PHYSICAL THERAPIST

## 2022-03-15 PROCEDURE — 97140 MANUAL THERAPY 1/> REGIONS: CPT | Performed by: PHYSICAL THERAPIST

## 2022-03-15 NOTE — PROGRESS NOTES
[facility]  [address], [place], [address]  Phone: [phone]  Fax: [phone]    PT INITIAL EVALUATION NOTE - [facility] 2-15    Patient Name: Linda Arevalo  Date:3/15/2022  : 1950  [x]  Patient  Verified   Payor: Estefany Olivera / Plan: [place] MED[place]ARE PART [place] & B / Product Type: Medicare /    In time:300  Out time:410  Total Treatment Time (min): 70  Total Timed Codes (min): 30  1:1 Treatment Time ( [address] only): 55   Visit #: 1     Treatment Area: Hip pain, right [M25.551]    Subjective:       Current symptoms/chief complaint:     R  Posterior PHILOMENA , stayed overnight 1 night in the hospital.  Pt reports he had 3.5 weeks of home health. Pt reports he finished up with HHPT 1 week ago. He reports he has been doing squats, marching, heel raises, lumbar extensions. He has been told to avoid twisting and avoid > 90 deg of hip flexion. He is only using OTC pain medication (tylenol). He is using ice now and then. He has been using a cane but in the past 4-5 days he hasn't felt like he needs it as much (except in the morning). Date of onset:  DOS. Aggravated by: twisting the wrong way. He has to be careful getting in and out of the car. Laying on the right hip. Stairs - he feels stiff. He is able to go up step over step except for in the mornings. Eased by:  Rest.    Location and description of symptoms: right posterior hip . Pain:   3/10 max 0/10 min 0/10 now       Social/Recreation/Work: Pt works as an  at [employer]. He has already returned to work once he was cleared to drive. He returned to work about 2 weeks ago. Pt lives at home with his wife. Pt also has 4 grand children : 8 yo, 2 yo and 3 yo (all boys) and a  girl. He has been having to sit in a chair to play with them and would like to be able to get onto the floor.   He does his own yard work, he has a non self propel and does weeding. He does have a nu step at home, he has been using the nu step. He has used it 4 x since surgery. He has done about 15 min. Prior level of function: able to more easily tie his shoes / don/doff shoes/socks. Patient goal(s): \"touch my toes\" get down onto the floor to play with grand children. Objective:    Posture: Pt stands with slight trunk flexion. Observed right PHILOMENA posterior incision, healing well, 1 small scab distal scar (approx 3 mm). Gait: Pt ambulates holding SPC in L UE (does not place on the ground) when ambulating back into clinic. Without SPC:  Pt ambulates with decreased late stance phase on the right compared to the left. Elevated right iliac crest      Stairs:  Able to ascend/descend 6 \" stairs in clinic step over step but pt notes inc. Discomfort/pressure in stance phase on R LE while ascending, shows decreased eccentric control on the right with descending. ROM:     Hip R AROM (PROM):  Hip flexion approx 90 deg with firm end feel and some discomfort. R hip ER and IR approx 10 deg with discomfort. R hip ext: to neutral / 0 deg with some discomfort. R hip ABD: 20 deg with tightness in inner thigh. Strength (1-5):  R LE:    Hip Flex: 2    ER: 3+  IR: 4- ABD 2  Ext: pt able to perform B/L bridge. Knee ext: 5 Flex 5     L LE:  5/5 throughout     Functional Biomechanical Screen  SLS: R holds 15-20 sec with decreased stability at ankle // inc. UE movement, L holds 30 sec easily. Bilateral Squat:able to perform a squat but quad dominant. Functional STS test:  12 reps. Palpation: TTP right ITB, TFL muscle, right glut med, piriformis muscles. Restricted soft tissue mobility over scar incision. Outcome Measure: Patient presents with a FOTO Score of see in chart.       Today's Treatment:    15 min Therapeutic Exercise:  [x] See flow sheet and HEP sheet : review of all HHPT HEP as well as inc. HEP fr pt. Rationale: increase strength and improve coordination to improve the patients ability to see below. 10 min Manual Therapy: STM (gentle) to right ITB / TFL and scar incision mobility. Rationale: decrease pain to improve the patients ability to progress to floor transfers and negotiating stairs without pain. Vasopneumatic x 15 ' to right hip end of session for pain, swelling. Pain Level (0-10 scale) post treatment: no c/o of pain.       Assessment: See POC    Plan: See [address] [place] [address] Kamar, PT, DPT, OCS    3/15/2022 2:58 PM

## 2022-03-15 NOTE — PROGRESS NOTES
[facility]  [address]  [place], [address]  Phone: [phone]  Fax: [phone]    Plan of Care/Statement of Necessity for Physical Therapy Services  2-15    Patient name: Francoise Gutiérrez  : 1950  Provider#: [id number]  Referral source: Eduardo Gaitan      Medical/Treatment Diagnosis: Hip pain, right [M25.551]     Prior Hospitalization: see medical history     Comorbidities: see eval.  Prior Level of Function: see eval.   Medications: Verified on Patient Summary List  Start of Care: see eval.          Onset Date: see eval.       The Plan of Care and following information is based on the information from the initial evaluation. Assessment/ key information: Pt is a 69 yo male s/p RIGHT posterior PHILOMENA . Pt presents with decreased ROM, decreased strength, increased TTP, gait deviations, decreased balance that is to be expected following joint replacement. Pt will benefit from skilled PT to address impairments above.        Evaluation Complexity History MEDIUM  Complexity : 1-2 comorbidities / personal factors will impact the outcome/ POC ; Examination MEDIUM Complexity : 3 Standardized tests and measures addressing body structure, function, activity limitation and / or participation in recreation  ;Presentation LOW Complexity : Stable, uncomplicated  ;Clinical Decision Making MEDIUM Complexity : FOTO score of 26-74  Overall Complexity Rating: LOW     Treatment Plan may include any combination of the following: Therapeutic exercise, Therapeutic activities, Neuromuscular re-education, Physical agent/modality, Gait/balance training, Manual therapy, Patient education and Self Care training   Patient / Family readiness to learn indicated by: asking questions, trying to perform skills and interest  Persons(s) to be included in education: patient (P)  Barriers to Learning/Limitations: None  Patient Goal (s): \"see eval  Patient Self Reported Health Status: good  Rehabilitation Potential: good    Short Term Goals: To be accomplished in 2-3 weeks:   1) Pt independent in HEP from day 1   2) Pt will be able to negotiate 12 steps in clinic step over step with good concentric and eccentric control without inc. Pain. Long Term Goals: To be accomplished in 6-8 weeks:   1) Pt independent in final HEP. 2) Pt able to perform a floor transfer without pain or difficulty to be able to play with his grandchildren. 3) Pt able to stand for 30 \" on R LE for improved stab. In stance that is = to non operative side. 4) Pt able to don/doff shoes/socks without inc. Pain. Frequency / Duration: Patient to be seen 2 times per week for 6-8 weeks. Patient/ Caregiver education and instruction: self care and exercises    [x]  Plan of care has been reviewed with PTA    Certification Period:   3/15/2022 - 06 /15/22      Amber Page, PT DPT,OCS 3/15/2022 2:58 PM    _______________________________________________________________________    I certify that the above Therapy Services are being furnished while the patient is under my care. I agree with the treatment plan and certify that this therapy is necessary.     [de-identified] Signature:____________________  Date:____________Time: _________    ___

## 2022-03-17 ENCOUNTER — HOSPITAL ENCOUNTER (OUTPATIENT)
Dept: PHYSICAL THERAPY | Age: 72
Discharge: HOME | End: 2022-03-17
Payer: MEDICARE

## 2022-03-17 PROCEDURE — 97110 THERAPEUTIC EXERCISES: CPT | Performed by: PHYSICAL THERAPIST

## 2022-03-17 PROCEDURE — 97140 MANUAL THERAPY 1/> REGIONS: CPT | Performed by: PHYSICAL THERAPIST

## 2022-03-17 PROCEDURE — 97112 NEUROMUSCULAR REEDUCATION: CPT | Performed by: PHYSICAL THERAPIST

## 2022-03-17 PROCEDURE — 97016 VASOPNEUMATIC DEVICE THERAPY: CPT | Performed by: PHYSICAL THERAPIST

## 2022-03-17 NOTE — PROGRESS NOTES
PT DAILY TREATMENT NOTE - [facility] 2-15    Patient Name: Yunier Mariano  Date:3/17/2022  : 1950  [x]  Patient  Verified  Payor: Diogo Carlos / Plan: [place] MEDICARE PART A & B / Product Type: Medicare /    In time:555  Out time: 705  Total Treatment Time (min): 70  Total Timed Codes (min): 55  1:1 Treatment Time ([address] only): 55   Visit #: 2    Treatment Area: Hip pain, right [M25.551]    SUBJECTIVE     Pain Level (0-10 scale), subjective functional status/changes: Pt reports 0/10 pain but has had soreness throughout his right hip. He reports he had some soreness after his first visit. Any medication changes, allergies to medications, adverse drug reactions, diagnosis change, or new procedure performed?: [x] No    [] Yes (see summary sheet for update) SEE ABOVE. OBJECTIVE     -    15 min Modality:      []  Ice     []  Heat       VASOPNEUMATIC x 15 minutes  Position/location:   Pt supine with wedge. Rationale: decrease pain to improve the patients ability to do functional activities   [x] Skin assessment post-treatment:  [x]intact []redness- no adverse reaction    []redness  adverse reaction:      10 min Therapeutic Exercise:  [x] See flow sheet :   Rationale: increase strength, improve coordination and increase proprioception to improve the patients ability to see below. 25 min Neuromuscular Re-education:  []  See flow sheet :  Weight shifts to the right, stagger stance left to right weight shifts with focus on recruitment of posterior hip muscles, ambulation with cues for equal stance time, pt given auditory cues. Rationale: improve coordination  to improve the patients ability to walk, negotiate stairs, tie shoes        20 min Manual Therapy:  Scar incision mobilization, STM with roller to ITB (gentle), STM to right glute med. Rationale: decrease pain, increase tissue extensibility and decrease trigger points  to improve the patients ability to see above.            With [] TE   [] TA   [] neuro   [] other: Patient Education: [x] Review HEP    [] Progressed/Changed HEP based on:   [] positioning   [] body mechanics   [] transfers   [] heat/ice application    [] other:        Pain Level (0-10 scale) post treatment: 0    ASSESSMENT/Changes in Function:    Pt with improved stance time on the RIGHT in mid to late stance with auditory cues in session today. Pt also did well with mirror for visual feed back with weight shifts to improve weight shift to the operative side. Patient will continue to benefit from skilled PT services to modify and progress therapeutic interventions, address functional mobility deficits, address strength deficits, assess and modify postural abnormalities and instruct in home and community integration to attain remaining goals. Progress towards goals / Updated goals:  Goals were not re-assessed today.      PLAN      [x]  Continue plan of care    []  Other:_      Martin Alexander, PT DPT, OCS 3/17/2022  5:10 PM       PT License #[id number]

## 2022-03-19 PROBLEM — M16.11 PRIMARY LOCALIZED OSTEOARTHRITIS OF RIGHT HIP: Status: ACTIVE | Noted: 2022-02-09

## 2022-03-24 ENCOUNTER — HOSPITAL ENCOUNTER (OUTPATIENT)
Dept: PHYSICAL THERAPY | Age: 72
Discharge: HOME | End: 2022-03-24
Payer: MEDICARE

## 2022-03-24 PROCEDURE — 97110 THERAPEUTIC EXERCISES: CPT | Performed by: PHYSICAL THERAPIST

## 2022-03-24 PROCEDURE — 97140 MANUAL THERAPY 1/> REGIONS: CPT | Performed by: PHYSICAL THERAPIST

## 2022-03-24 PROCEDURE — 97112 NEUROMUSCULAR REEDUCATION: CPT | Performed by: PHYSICAL THERAPIST

## 2022-03-24 PROCEDURE — 97016 VASOPNEUMATIC DEVICE THERAPY: CPT | Performed by: PHYSICAL THERAPIST

## 2022-03-24 NOTE — PROGRESS NOTES
PT DAILY TREATMENT NOTE - [facility] 2-15    Patient Name: Shanon Blanco  Date:3/24/2022  : 1950  [x]  Patient  Verified  Payor: VA MEDICARE / Plan: VA MEDICARE PART A & B / Product Type: Medicare /    In time:545  Out time: 297  Total Treatment Time (min):60   Total Timed Codes (min): 45  1:1 Treatment Time [facility] only):45    Visit #: 3    Treatment Area: Hip pain, right [M25.551]    SUBJECTIVE     Pain Level (0-10 scale), subjective functional status/changes: Pt reports 0/10 pain. Pt reports he would like to work on touching his toes. Any medication changes, allergies to medications, adverse drug reactions, diagnosis change, or new procedure performed?: [x] No    [] Yes (see summary sheet for update) SEE ABOVE. OBJECTIVE     -    15 min Modality:      []  Ice     []  Heat       VASOPNEUMATIC x 15 minutes  Position/location:   Pt supine with wedge. Rationale: decrease pain to improve the patients ability to do functional activities   [x] Skin assessment post-treatment:  [x]intact []redness- no adverse reaction    []redness  adverse reaction:      15 min Therapeutic Exercise:  [x] See flow sheet :   Rationale: increase strength, improve coordination and increase proprioception to improve the patients ability to see below. 15 min Neuromuscular Re-education:  []  See flow sheet :  Weight shifts to the right, stagger stance left to right weight shifts with focus on recruitment of posterior hip muscles, ambulation with cues for equal stance time   Rationale: improve coordination  to improve the patients ability to walk, negotiate stairs, tie shoes        15 min Manual Therapy:  Scar incision mobilization, STM with roller to ITB (gentle), STM to right glute med. Rationale: decrease pain, increase tissue extensibility and decrease trigger points  to improve the patients ability to see above.            With   [] TE   [] TA   [] neuro   [] other: Patient Education: [x] Review HEP [] Progressed/Changed HEP based on:   [] positioning   [] body mechanics   [] transfers   [] heat/ice application    [] other:        Pain Level (0-10 scale) post treatment: 0    ASSESSMENT/Changes in Function:   Pt seems to be improving with inc. Stance time on the right as compared to last week. He still requires frequent cueing for correct form with mini squats and weight shifts. Patient will continue to benefit from skilled PT services to modify and progress therapeutic interventions, address functional mobility deficits, address strength deficits, assess and modify postural abnormalities and instruct in home and community integration to attain remaining goals. Short Term Goals: To be accomplished in 2-3 weeks:              1) Pt independent in HEP from day 1 MET               2) Pt will be able to negotiate 12 steps in clinic step over step with good concentric and eccentric control without inc. Pain.  progressing. Long Term Goals: To be accomplished in 6-8 weeks:              1) Pt independent in final HEP. 2) Pt able to perform a floor transfer without pain or difficulty to be able to play with his grandchildren. 3) Pt able to stand for 30 \" on R LE for improved stab. In stance that is = to non operative side. 4) Pt able to don/doff shoes/socks without inc. Pain.                         PLAN      [x]  Continue plan of care    []  Other:_      Jaz Reyes, PT DPT, [facility] 3/24/2022  8:76 PM       PT License #[id number]

## 2022-03-29 ENCOUNTER — HOSPITAL ENCOUNTER (OUTPATIENT)
Dept: PHYSICAL THERAPY | Age: 72
Discharge: HOME | End: 2022-03-29
Payer: MEDICARE

## 2022-03-29 PROCEDURE — 97110 THERAPEUTIC EXERCISES: CPT | Performed by: PHYSICAL THERAPIST

## 2022-03-29 PROCEDURE — 97016 VASOPNEUMATIC DEVICE THERAPY: CPT | Performed by: PHYSICAL THERAPIST

## 2022-03-29 PROCEDURE — 97112 NEUROMUSCULAR REEDUCATION: CPT | Performed by: PHYSICAL THERAPIST

## 2022-03-29 PROCEDURE — 97140 MANUAL THERAPY 1/> REGIONS: CPT | Performed by: PHYSICAL THERAPIST

## 2022-03-29 NOTE — PROGRESS NOTES
PT DAILY TREATMENT NOTE - [facility] 2-15    Patient Name: Dorian Steel  Date:3/29/2022  : 1950  [x]  Patient  Verified  Payor: [place] MEDICARE / Plan: [place] MEDICARE PART A & B / Product Type: Medicare /    In time: 350  Out time: 500  Total Treatment Time (min):70   Total Timed Codes (min): 55  1:1 Treatment Time [facility] only):40     Visit #: 4    Treatment Area: Hip pain, right [M25.551]    SUBJECTIVE     Pain Level (0-10 scale), subjective functional status/changes: Pt reports 1/10. Pt reports he has had some more pain \"I think I've had a regression. \"  Pt admits he did change his own oil on his car, he transferred onto the ground \"I was really careful. \"  He reports more pain the next day. He also tried to bend over to get something off the floor and had more pain after that. He returns to his surgeon Thursday. Any medication changes, allergies to medications, adverse drug reactions, diagnosis change, or new procedure performed?: [x] No    [] Yes (see summary sheet for update) SEE ABOVE. OBJECTIVE     -    15 min Modality:      []  Ice     []  Heat       VASOPNEUMATIC x 15 minutes  Position/location:   Pt supine with wedge. Rationale: decrease pain to improve the patients ability to do functional activities   [x] Skin assessment post-treatment:  [x]intact []redness- no adverse reaction    []redness  adverse reaction:      25 min Therapeutic Exercise:  [x] See flow sheet : added standing HS stretch. Inc. Time to discuss hip precautions with patient. Rationale: increase strength, improve coordination and increase proprioception to improve the patients ability to see below.            15 min Neuromuscular Re-education:  []  See flow sheet :  Weight shifts to the right, stagger stance left to right weight shifts with focus on recruitment of posterior hip muscles, ambulation with cues for equal stance time   Rationale: improve coordination  to improve the patients ability to walk, negotiate stairs, tie shoes        15 min Manual Therapy:  Scar incision mobilization, STM with roller to ITB (gentle), STM to right glute med. Rationale: decrease pain, increase tissue extensibility and decrease trigger points  to improve the patients ability to see above. With   [] TE   [] TA   [] neuro   [] other: Patient Education: [x] Review HEP    [] Progressed/Changed HEP based on:   [] positioning   [] body mechanics   [] transfers   [] heat/ice application    [] other:        Pain Level (0-10 scale) post treatment: 0    ASSESSMENT/Changes in Function:   Pt with inc. Symptoms since last visit likely due to changing the oil for his car. Inc. Time to discuss importance of following hip precautions and avoiding too much stress to his right hip. Patient will continue to benefit from skilled PT services to modify and progress therapeutic interventions, address functional mobility deficits, address strength deficits, assess and modify postural abnormalities and instruct in home and community integration to attain remaining goals. Short Term Goals: To be accomplished in 2-3 weeks:              1) Pt independent in HEP from day 1 MET               2) Pt will be able to negotiate 12 steps in clinic step over step with good concentric and eccentric control without inc. Pain.  progressing. Long Term Goals: To be accomplished in 6-8 weeks:              1) Pt independent in final HEP. 2) Pt able to perform a floor transfer without pain or difficulty to be able to play with his grandchildren. 3) Pt able to stand for 30 \" on R LE for improved stab. In stance that is = to non operative side. 4) Pt able to don/doff shoes/socks without inc. Pain.                         PLAN      [x]  Continue plan of care    []  Other:_      Flo Crawley, PT DPT, OCS 3/29/2022  9:26 PM       PT License #[id number]

## 2022-03-31 ENCOUNTER — HOSPITAL ENCOUNTER (OUTPATIENT)
Dept: PHYSICAL THERAPY | Age: 72
Discharge: HOME | End: 2022-03-31
Payer: MEDICARE

## 2022-03-31 ENCOUNTER — OFFICE VISIT (OUTPATIENT)
Dept: ORTHOPEDIC SURGERY | Age: 72
End: 2022-03-31
Payer: MEDICARE

## 2022-03-31 VITALS — BODY MASS INDEX: 23.62 KG/M2 | WEIGHT: 190 LBS | HEIGHT: 75 IN

## 2022-03-31 DIAGNOSIS — Z96.641 STATUS POST TOTAL REPLACEMENT OF RIGHT HIP: Primary | ICD-10-CM

## 2022-03-31 DIAGNOSIS — Z09 SURGERY FOLLOW-UP: ICD-10-CM

## 2022-03-31 PROCEDURE — 97112 NEUROMUSCULAR REEDUCATION: CPT | Performed by: PHYSICAL THERAPIST

## 2022-03-31 PROCEDURE — 97110 THERAPEUTIC EXERCISES: CPT | Performed by: PHYSICAL THERAPIST

## 2022-03-31 PROCEDURE — 97140 MANUAL THERAPY 1/> REGIONS: CPT | Performed by: PHYSICAL THERAPIST

## 2022-03-31 PROCEDURE — 99024 POSTOP FOLLOW-UP VISIT: CPT | Performed by: ORTHOPAEDIC SURGERY

## 2022-03-31 PROCEDURE — 97016 VASOPNEUMATIC DEVICE THERAPY: CPT | Performed by: PHYSICAL THERAPIST

## 2022-03-31 NOTE — PROGRESS NOTES
PT DAILY TREATMENT NOTE - [facility] 2-15    Patient Name: Raissa Kaminski  Date:3/31/2022  : 1950  [x]  Patient  Verified  Payor: Mary Jo Yates / Plan: [place] MEDICARE PART A & B / Product Type: Medicare /    In time: 100  Out time: 215  Total Treatment Time (min):75   Total Timed Codes (min): 55  1:1 Treatment Time [facility] only):55     Visit #: 5    Treatment Area: Hip pain, right [M25.551]    SUBJECTIVE     Pain Level (0-10 scale), subjective functional status/changes: Pt reports 0-1/10 pain. Pt reports less twinges since last visit. He will see Dr. Gregg tyler later today. Any medication changes, allergies to medications, adverse drug reactions, diagnosis change, or new procedure performed?: [x] No    [] Yes (see summary sheet for update) SEE ABOVE. OBJECTIVE     -    15 min Modality:      []  Ice     []  Heat       VASOPNEUMATIC x 15 minutes  Position/location:   Pt supine with wedge. Rationale: decrease pain to improve the patients ability to do functional activities   [x] Skin assessment post-treatment:  [x]intact []redness- no adverse reaction    []redness  adverse reaction:      25 min Therapeutic Exercise:  [x] See flow sheet : added cable column FW step (x 10 either LE). Rationale: increase strength, improve coordination and increase proprioception to improve the patients ability to see below. 15 min Neuromuscular Re-education:  []  See flow sheet :  Weight shifts to the right, stagger stance left to right weight shifts with focus on recruitment of posterior hip muscles, ambulation with cues for equal stance time  Perturbations given at ASIS to facilitate glutes / hip rotators. Rationale: improve coordination  to improve the patients ability to walk, negotiate stairs, tie shoes        15 min Manual Therapy:  Scar incision mobilization, STM with roller to ITB (gentle), STM to right glute med.      Rationale: decrease pain, increase tissue extensibility and decrease trigger points to improve the patients ability to see above. With   [] TE   [] TA   [] neuro   [] other: Patient Education: [x] Review HEP    [] Progressed/Changed HEP based on:   [] positioning   [] body mechanics   [] transfers   [] heat/ice application    [] other:        Pain Level (0-10 scale) post treatment: 0    ASSESSMENT/Changes in Function:   Pt did well with addition of cable column forward step. Pt encouraged to ask surgeon about length of hip precautions (he is eager to work on touching his toes). Patient will continue to benefit from skilled PT services to modify and progress therapeutic interventions, address functional mobility deficits, address strength deficits, assess and modify postural abnormalities and instruct in home and community integration to attain remaining goals. Short Term Goals: To be accomplished in 2-3 weeks:              1) Pt independent in HEP from day 1 MET               2) Pt will be able to negotiate 12 steps in clinic step over step with good concentric and eccentric control without inc. Pain.  progressing. Long Term Goals: To be accomplished in 6-8 weeks:              1) Pt independent in final HEP. 2) Pt able to perform a floor transfer without pain or difficulty to be able to play with his grandchildren. 3) Pt able to stand for 30 \" on R LE for improved stab. In stance that is = to non operative side. 4) Pt able to don/doff shoes/socks without inc. Pain. PLAN      [x]  Continue plan of care. Re-eval 04/15 or earlier.       []  Other:_      Melissa Alves, PT DPT, OCS 3/31/2022  0:09 PM       PT License #[id number]

## 2022-04-12 ENCOUNTER — HOSPITAL ENCOUNTER (OUTPATIENT)
Dept: PHYSICAL THERAPY | Age: 72
Discharge: HOME | End: 2022-04-12
Payer: MEDICARE

## 2022-04-12 PROCEDURE — 97112 NEUROMUSCULAR REEDUCATION: CPT | Performed by: PHYSICAL THERAPIST

## 2022-04-12 PROCEDURE — 97110 THERAPEUTIC EXERCISES: CPT | Performed by: PHYSICAL THERAPIST

## 2022-04-12 PROCEDURE — 97140 MANUAL THERAPY 1/> REGIONS: CPT | Performed by: PHYSICAL THERAPIST

## 2022-04-12 PROCEDURE — 97016 VASOPNEUMATIC DEVICE THERAPY: CPT | Performed by: PHYSICAL THERAPIST

## 2022-04-12 NOTE — PROGRESS NOTES
PT DAILY TREATMENT NOTE - [facility] 2-15    Patient Name: Krista Graves  Date:2022  : 1950  [x]  Patient  Verified  Payor: Minda Dotson / Plan: [place] MEDICARE PART A & B / Product Type: Medicare /    In time: 400  Out time: 525  Total Treatment Time (min):85   Total Timed Codes (min):  70   1:1 Treatment Time ( [address] only): 70      Visit #: 6    Treatment Area: Hip pain, right [M25.551]    SUBJECTIVE     Pain Level (0-10 scale), subjective functional status/changes: Pt reports 0/10 pain. Pt reports he saw Dr. Darwin Grant and he was please with his progress. \"He said come back in a year. \"  Pt reports he would really like to work on trying to put shoes and socks on because that is difficult. Any medication changes, allergies to medications, adverse drug reactions, diagnosis change, or new procedure performed?: [x] No    [] Yes (see summary sheet for update) SEE ABOVE. OBJECTIVE     -    15 min Modality:      []  Ice     []  Heat       VASOPNEUMATIC x 15 minutes  Position/location:   Pt supine with wedge. Rationale: decrease pain to improve the patients ability to do functional activities   [x] Skin assessment post-treatment:  [x]intact []redness- no adverse reaction    []redness  adverse reaction:      40 min Therapeutic Exercise:  [x] See flow sheet : cont. With  cable column FW step (x 10 either LE) and then added side steps (15 lb, x 10 each). Supine marching x 10 each. Rationale: increase strength, improve coordination and increase proprioception to improve the patients ability to see below. 15 min Neuromuscular Re-education:  []  See flow sheet :   Perturbations given at ASIS/iliac crest/pelvis  to facilitate glutes / hip rotators in stagger stance (R foot forward). SLS 30 \" x 3 (inc. Duration)  Tandem on level ground and then on tandem.        Rationale: improve coordination  to improve the patients ability to walk, negotiate stairs, tie shoes        15 min Manual Therapy:  Scar incision mobilization, STM to ITB (gentle), STM to right glute med. And piriformis muscle. Gentle hip flexion ROM with slight OP at end range. Rationale: decrease pain, increase tissue extensibility and decrease trigger points  to improve the patients ability to see above. With   [] TE   [] TA   [] neuro   [] other: Patient Education: [x] Review HEP    [] Progressed/Changed HEP based on:   [] positioning   [] body mechanics   [] transfers   [] heat/ice application    [] other:        Pain Level (0-10 scale) post treatment: 0    ASSESSMENT/Changes in Function:   Pt recently saw Dr. Kaia Lindo and reports Dr. Kaia Lindo was pleased with his results thus far. Pt reporting he would like to work towards putting on shoes / socks as Dr. Kaia Lindo told him he could gradually work on this. Pt did well with progression of various exercises today. Patient will continue to benefit from skilled PT services to modify and progress therapeutic interventions, address functional mobility deficits, address strength deficits, assess and modify postural abnormalities and instruct in home and community integration to attain remaining goals. Short Term Goals: To be accomplished in 2-3 weeks:              1) Pt independent in HEP from day 1 MET               2) Pt will be able to negotiate 12 steps in clinic step over step with good concentric and eccentric control without inc. Pain.  progressing. Long Term Goals: To be accomplished in 6-8 weeks:              1) Pt independent in final HEP. 2) Pt able to perform a floor transfer without pain or difficulty to be able to play with his grandchildren. 3) Pt able to stand for 30 \" on R LE for improved stab. In stance that is = to non operative side. 4) Pt able to don/doff shoes/socks without inc. Pain. PLAN      [x]  Continue plan of care. Re-eval 04/15 or earlier.       []  Other:_ Janny Rojas, PT DPT, OCS 4/12/2022  2:62 PM       PT License #[id number]

## 2022-04-14 ENCOUNTER — HOSPITAL ENCOUNTER (OUTPATIENT)
Dept: PHYSICAL THERAPY | Age: 72
Discharge: HOME | End: 2022-04-14
Payer: MEDICARE

## 2022-04-14 PROCEDURE — 97110 THERAPEUTIC EXERCISES: CPT | Performed by: PHYSICAL THERAPIST

## 2022-04-14 PROCEDURE — 97112 NEUROMUSCULAR REEDUCATION: CPT | Performed by: PHYSICAL THERAPIST

## 2022-04-14 PROCEDURE — 97140 MANUAL THERAPY 1/> REGIONS: CPT | Performed by: PHYSICAL THERAPIST

## 2022-04-14 PROCEDURE — 97016 VASOPNEUMATIC DEVICE THERAPY: CPT | Performed by: PHYSICAL THERAPIST

## 2022-04-14 NOTE — PROGRESS NOTES
PT DAILY TREATMENT NOTE - [facility] 2-15    Patient Name: Raissa Kaminski  Date:2022  : 1950  [x]  Patient  Verified  Payor: Mary Jo Yates / Plan: [place] MEDICARE PART A & B / Product Type: Medicare /    In time: 778  Out time: 515  Total Treatment Time (min):75   Total Timed Codes (min):   55  1:1 Treatment Time [facility] only):38       Visit #: 7    Treatment Area: Hip pain, right [M25.551]    SUBJECTIVE     Pain Level (0-10 scale), subjective functional status/changes: Pt reports 0/10 pain. Pt reports he has noticed some soreness in his right hip with stair climbing after last visit \"like the muscles were worked. \"    Any medication changes, allergies to medications, adverse drug reactions, diagnosis change, or new procedure performed?: [x] No    [] Yes (see summary sheet for update) SEE ABOVE. OBJECTIVE     15 min Modality:      []  Ice     []  Heat       VASOPNEUMATIC x 15 minutes  Position/location:   Pt supine with wedge. Rationale: decrease pain to improve the patients ability to do functional activities   [x] Skin assessment post-treatment:  [x]intact []redness- no adverse reaction    []redness  adverse reaction:      25 min Therapeutic Exercise:  [x] See flow sheet : cont. With  cable column FW step (x 10 either LE) and then added side steps (15 lb, x 10 each). HOLD today Supine marching x 10 each. Rationale: increase strength, improve coordination and increase proprioception to improve the patients ability to see below. 15 min Neuromuscular Re-education:  []  See flow sheet :   Perturbations given at ASIS/iliac crest/pelvis  to facilitate glutes / hip rotators in stagger stance (R foot forward). SLS 30 \" x 3 (inc. Duration)  Tandem on level ground and then on tandem.        Rationale: improve coordination  to improve the patients ability to walk, negotiate stairs, tie shoes        15 min Manual Therapy:  Scar incision mobilization, STM to ITB (gentle), STM to right glute med.  And piriformis muscle. Rationale: decrease pain, increase tissue extensibility and decrease trigger points  to improve the patients ability to see above. With   [] TE   [] TA   [] neuro   [] other: Patient Education: [x] Review HEP    [] Progressed/Changed HEP based on:   [] positioning   [] body mechanics   [] transfers   [] heat/ice application    [] other:        Pain Level (0-10 scale) post treatment: 0    ASSESSMENT/Changes in Function:   Pt challenged with tandem balance on [employer] foam and side steps / FW steps with cable column. Patient will continue to benefit from skilled PT services to modify and progress therapeutic interventions, address functional mobility deficits, address strength deficits, assess and modify postural abnormalities and instruct in home and community integration to attain remaining goals. Short Term Goals: To be accomplished in 2-3 weeks:              1) Pt independent in HEP from day 1 MET               2) Pt will be able to negotiate 12 steps in clinic step over step with good concentric and eccentric control without inc. Pain.  progressing. Long Term Goals: To be accomplished in 6-8 weeks:              1) Pt independent in final HEP. 2) Pt able to perform a floor transfer without pain or difficulty to be able to play with his grandchildren. 3) Pt able to stand for 30 \" on R LE for improved stab. In stance that is = to non operative side. 4) Pt able to don/doff shoes/socks without inc. Pain. PLAN      [x]  Continue plan of care. Re-eval 04/15 or earlier.       []  Other:_      Brittney Redd, PT DPT, OCS 4/14/2022  2:77 PM       PT License #[id number]

## 2022-04-19 ENCOUNTER — HOSPITAL ENCOUNTER (OUTPATIENT)
Dept: PHYSICAL THERAPY | Age: 72
Discharge: HOME | End: 2022-04-19
Payer: MEDICARE

## 2022-04-19 PROCEDURE — 97110 THERAPEUTIC EXERCISES: CPT | Performed by: PHYSICAL THERAPIST

## 2022-04-19 PROCEDURE — 97016 VASOPNEUMATIC DEVICE THERAPY: CPT | Performed by: PHYSICAL THERAPIST

## 2022-04-19 PROCEDURE — 97112 NEUROMUSCULAR REEDUCATION: CPT | Performed by: PHYSICAL THERAPIST

## 2022-04-19 PROCEDURE — 97140 MANUAL THERAPY 1/> REGIONS: CPT | Performed by: PHYSICAL THERAPIST

## 2022-04-19 NOTE — PROGRESS NOTES
PT DAILY TREATMENT NOTE - [facility] 2-15    Patient Name: Gilbert Randle  Date:2022  : 1950  [x]  Patient  Verified  Payor: Estefania Jalloh / Plan: [place] MEDICARE PART A & B / Product Type: Medicare /    In time: 400  Out time: 505   Total Treatment Time (min):65  Total Timed Codes (min):   50  1:1 Treatment Time ([facility] only) 38      Visit #: 8    Treatment Area: Hip pain, right [M25.551]    SUBJECTIVE     Pain Level (0-10 scale), subjective functional status/changes: Pt reports 0/10 pain. Pt reports he did mow his lawn and use the weed eater and he needed to ice after that because it was sore. Any medication changes, allergies to medications, adverse drug reactions, diagnosis change, or new procedure performed?: [x] No    [] Yes (see summary sheet for update) SEE ABOVE. OBJECTIVE     15 min Modality:      []  Ice     []  Heat       VASOPNEUMATIC x 15 minutes  Position/location:   Pt supine with wedge. Rationale: decrease pain to improve the patients ability to do functional activities   [x] Skin assessment post-treatment:  [x]intact []redness- no adverse reaction    []redness  adverse reaction:      25 min Therapeutic Exercise:  [x] See flow sheet : cont. With  cable column FW step (x 10 either LE) and then added side steps (15 lb, x 10 each). HOLD today Supine marching x 10 each. Rationale: increase strength, improve coordination and increase proprioception to improve the patients ability to see below. 10 min Neuromuscular Re-education:  []  See flow sheet :   Perturbations given at ASIS/iliac crest/pelvis  to facilitate glutes / hip rotators in stagger stance (R foot forward). HELD TODAY SLS 30 \" x 3 (inc. Duration)  Tandem on AIRE[employer] Foam.        Rationale: improve coordination  to improve the patients ability to walk, negotiate stairs, tie shoes        15 min Manual Therapy:  Scar incision mobilization, STM  STM to right glute med. And piriformis muscle.     Gentle R hip flexion PROM       Rationale: decrease pain, increase tissue extensibility and decrease trigger points  to improve the patients ability to see above. With   [] TE   [] TA   [] neuro   [] other: Patient Education: [x] Review HEP    [] Progressed/Changed HEP based on:   [] positioning   [] body mechanics   [] transfers   [] heat/ice application    [] other:        Pain Level (0-10 scale) post treatment: 0    ASSESSMENT/Changes in Function:        Pt did well throughout session today, did well with progression of squats to AIREX foam.    Patient will continue to benefit from skilled PT services to modify and progress therapeutic interventions, address functional mobility deficits, address strength deficits, assess and modify postural abnormalities and instruct in home and community integration to attain remaining goals. Short Term Goals: To be accomplished in 2-3 weeks:              1) Pt independent in HEP from day 1 MET               2) Pt will be able to negotiate 12 steps in clinic step over step with good concentric and eccentric control without inc. Pain.  progressing. Long Term Goals: To be accomplished in 6-8 weeks:              1) Pt independent in final HEP. 2) Pt able to perform a floor transfer without pain or difficulty to be able to play with his grandchildren. 3) Pt able to stand for 30 \" on R LE for improved stab. In stance that is = to non operative side. 4) Pt able to don/doff shoes/socks without inc. Pain. PLAN      [x]  Continue plan of care.   Re-eval next visit    []  Other:_      Cirilo Diana, PT DPT, OCS 4/19/2022  3:31 PM       PT License #[id number]

## 2022-04-21 ENCOUNTER — HOSPITAL ENCOUNTER (OUTPATIENT)
Dept: PHYSICAL THERAPY | Age: 72
Discharge: HOME | End: 2022-04-21
Payer: MEDICARE

## 2022-04-21 PROCEDURE — 97110 THERAPEUTIC EXERCISES: CPT | Performed by: PHYSICAL THERAPIST

## 2022-04-21 PROCEDURE — 97112 NEUROMUSCULAR REEDUCATION: CPT | Performed by: PHYSICAL THERAPIST

## 2022-04-21 PROCEDURE — 97140 MANUAL THERAPY 1/> REGIONS: CPT | Performed by: PHYSICAL THERAPIST

## 2022-04-21 PROCEDURE — 97016 VASOPNEUMATIC DEVICE THERAPY: CPT | Performed by: PHYSICAL THERAPIST

## 2022-04-21 NOTE — PROGRESS NOTES
PT Re-evaluation / DAILY TREATMENT NOTE - [facility] 215    Patient Name: Antonette Lehman  Date:2022  : 1950  [x]  Patient  Verified  Payor: [employer]  / Plan: [place] MEDICARE PART A & B / Product Type: Medicare /    In time: 355  Out time: 515   Total Treatment Time (min):80  Total Timed Codes (min):   65  1:1 Treatment Time ( [address] only) 38      Visit #: 9    Treatment Area: Hip pain, right [M25.551]    SUBJECTIVE     Pain Level (0-10 scale), subjective functional status/changes: Pt reports 0/10 pain. Pt reports he will be seeing his grandchildren the week after next and hopes to get on the floor with them. Any medication changes, allergies to medications, adverse drug reactions, diagnosis change, or new procedure performed?: [x] No    [] Yes (see summary sheet for update) SEE ABOVE. OBJECTIVE     ROM:  R hip flexion seated 100 deg. R hip ER seated 28 deg  R hip IR seated 22 deg    L hip ER seated 35 deg  L hip IR seated 24 deg    Strength:  R hip flexion 5/5 avail range  R hip ER 4/5  R hip IR 5/5  (tested in seated position)  S/L R hip ER:  2/5 avail range  S/L R hip ABD / glute med:  3/5 \"the leg feels heavy\"    Balance:  SLS: able to hold 30 sec with inc. L LE motion / trunk lean at times. Palpation:  TTP along scar incision, R glute. Med. And piriformis muscles. Stairs: able to negotiate 12 stairs step over step without pain. 15 min Modality:      []  Ice     []  Heat       VASOPNEUMATIC x 15 minutes  Position/location:   Pt supine with wedge. Rationale: decrease pain to improve the patients ability to do functional activities   [x] Skin assessment post-treatment:  [x]intact []redness- no adverse reaction    []redness  adverse reaction:      45 min Therapeutic Exercise:  [x] See flow sheet : re-evaluation. Review of floor transfer and sitting posture. S/L clam and S/L hip abduction with assistance added.        Rationale: increase strength, improve coordination and increase proprioception to improve the patients ability to see below. 10 min Neuromuscular Re-education:  []  See flow sheet :   rockerboard     SLS 30 \" x 3 (inc. Duration)  Tandem on [employer] Foam.            Rationale: improve coordination  to improve the patients ability to walk, negotiate stairs, tie shoes        10 min Manual Therapy:  Scar incision mobilization, STM  STM to right glute med. And piriformis muscle. Rationale: decrease pain, increase tissue extensibility and decrease trigger points  to improve the patients ability to see above. With   [] TE   [] TA   [] neuro   [] other: Patient Education: [x] Review HEP    [] Progressed/Changed HEP based on:   [] positioning   [] body mechanics   [] transfers   [] heat/ice application    [] other:        Pain Level (0-10 scale) post treatment: 0    ASSESSMENT/Changes in Function:   Pt with 9 skilled PT sessions s/p right posterior PHILOMENA. Pt showing improved ROM, strength, balance and negotiation of stairs. Pt will benefit from cont. PT for strengthening, improving balance, improving ROM for his active lifestyle. Patient will continue to benefit from skilled PT services to modify and progress therapeutic interventions, address functional mobility deficits, address strength deficits, assess and modify postural abnormalities and instruct in home and community integration to attain remaining goals. Short Term Goals: To be accomplished in 2-3 weeks:              1) Pt independent in HEP from day 1 MET               2) Pt will be able to negotiate 12 steps in clinic step over step with good concentric and eccentric control without inc. Pain. MET    Long Term Goals: To be accomplished in 6-8 weeks:              1) Pt independent in final HEP. 2) Pt able to perform a floor transfer without pain or difficulty to be able to play with his grandchildren. 3) Pt able to stand for 30 \" on R LE for improved stab. In stance that is = to non operative side PROGRESSING (holds 30\" but fair stab. ). 4) Pt able to don/doff shoes/socks without inc. Pain.   progressing. PLAN      [x]  Continue plan of care. Cont.  2x/week for 4 weeks from 04/21 re-eval.     []  Other:_      Samantha Oden, PT DPT, OCS 4/21/2022  0:85 PM       PT License #[id number]

## 2022-04-26 ENCOUNTER — HOSPITAL ENCOUNTER (OUTPATIENT)
Dept: PHYSICAL THERAPY | Age: 72
Discharge: HOME | End: 2022-04-26
Payer: MEDICARE

## 2022-04-26 PROCEDURE — 97112 NEUROMUSCULAR REEDUCATION: CPT | Performed by: PHYSICAL THERAPIST

## 2022-04-26 PROCEDURE — 97110 THERAPEUTIC EXERCISES: CPT | Performed by: PHYSICAL THERAPIST

## 2022-04-26 PROCEDURE — 97016 VASOPNEUMATIC DEVICE THERAPY: CPT | Performed by: PHYSICAL THERAPIST

## 2022-04-26 PROCEDURE — 97140 MANUAL THERAPY 1/> REGIONS: CPT | Performed by: PHYSICAL THERAPIST

## 2022-04-26 NOTE — PROGRESS NOTES
PT  DAILY TREATMENT NOTE - [facility] 2-15    Patient Name: Bryson Bazzi  Date:2022  : 1950  [x]  Patient  Verified  Payor: Josh Chacon / Plan: [place] MED[place] & B / Product Type: Medicare /    In time: 355  Out time: 515   Total Treatment Time (min):80  Total Timed Codes (min):   65  1:1 Treatment Time ([facility] only) 38      Visit #: 10    Treatment Area: Hip pain, right [M25.551]    SUBJECTIVE     Pain Level (0-10 scale), subjective functional status/changes: Pt reports 0/10 pain. Pt reports he was reaching to change a battery in his wife's car and twisted / increased R LBP but did not hurt his hip. He is feeling OK now. Any medication changes, allergies to medications, adverse drug reactions, diagnosis change, or new procedure performed?: [x] No    [] Yes (see summary sheet for update) SEE ABOVE. OBJECTIVE        15 min Modality:      []  Ice     []  Heat       VASOPNEUMATIC x 15 minutes  Position/location:   Pt supine with wedge. Rationale: decrease pain to improve the patients ability to do functional activities   [x] Skin assessment post-treatment:  [x]intact []redness- no adverse reaction    []redness  adverse reaction:      45 min Therapeutic Exercise:  [x] See flow sheet : re-evaluation. Review of floor transfer and sitting posture. S/L clam and S/L hip abduction with assistance added. Rationale: increase strength, improve coordination and increase proprioception to improve the patients ability to see below. 10 min Neuromuscular Re-education:  []  See flow sheet :   rockerboard   SLS 30 \" x 3 (inc. Duration)  Tandem on [employer] Foam.      Pt with manual assistance / cueing for S/L hip abduction / clam.           Rationale: improve coordination  to improve the patients ability to walk, negotiate stairs, tie shoes        10 min Manual Therapy:  Scar incision mobilization, STM  STM to right glute med. And piriformis muscle.            Rationale: decrease pain, increase tissue extensibility and decrease trigger points  to improve the patients ability to see above. With   [] TE   [] TA   [] neuro   [] other: Patient Education: [x] Review HEP    [] Progressed/Changed HEP based on:   [] positioning   [] body mechanics   [] transfers   [] heat/ice application    [] other:        Pain Level (0-10 scale) post treatment: 0    ASSESSMENT/Changes in Function:   Pt challenged with S/L hip strengthening (clams, hip ABD), manual assistance and cueing given throughout. Patient will continue to benefit from skilled PT services to modify and progress therapeutic interventions, address functional mobility deficits, address strength deficits, assess and modify postural abnormalities and instruct in home and community integration to attain remaining goals. Short Term Goals: To be accomplished in 2-3 weeks:              1) Pt independent in HEP from day 1 MET               2) Pt will be able to negotiate 12 steps in clinic step over step with good concentric and eccentric control without inc. Pain. MET    Long Term Goals: To be accomplished in 6-8 weeks:              1) Pt independent in final HEP. 2) Pt able to perform a floor transfer without pain or difficulty to be able to play with his grandchildren. 3) Pt able to stand for 30 \" on R LE for improved stab. In stance that is = to non operative side PROGRESSING (holds 30\" but fair stab. ). 4) Pt able to don/doff shoes/socks without inc. Pain.   progressing. PLAN      [x]  Continue plan of care. Cont.  2x/week for 4 weeks from 04/21 re-eval.     []  Other:_      Rosa Son, PT DPT, OCS 4/26/2022  1:30 PM       PT License #[id number]

## 2022-04-28 ENCOUNTER — HOSPITAL ENCOUNTER (OUTPATIENT)
Dept: PHYSICAL THERAPY | Age: 72
Discharge: HOME | End: 2022-04-28
Payer: MEDICARE

## 2022-04-28 PROCEDURE — 97112 NEUROMUSCULAR REEDUCATION: CPT | Performed by: PHYSICAL THERAPIST

## 2022-04-28 PROCEDURE — 97016 VASOPNEUMATIC DEVICE THERAPY: CPT | Performed by: PHYSICAL THERAPIST

## 2022-04-28 PROCEDURE — 97110 THERAPEUTIC EXERCISES: CPT | Performed by: PHYSICAL THERAPIST

## 2022-04-28 PROCEDURE — 97140 MANUAL THERAPY 1/> REGIONS: CPT | Performed by: PHYSICAL THERAPIST

## 2022-04-28 NOTE — PROGRESS NOTES
PT  DAILY TREATMENT NOTE - [facility] 2-15    Patient Name: Gilbert Randle  Date:2022  : 1950  [x]  Patient  Verified  Payor: Estefania Jalloh / Plan: [place] MEDICARE PART A & B / Product Type: Medicare /    In time: 430  Out time: 550  Total Treatment Time (min):80  Total Timed Codes (min):   65  1:1 Treatment Time ([address] only) 50      Visit #: 11    Treatment Area: Hip pain, right [M25.551]    SUBJECTIVE     Pain Level (0-10 scale), subjective functional status/changes: Pt reports 0/10 pain. He still has some instances of feeling like his right hip may give way with his first step. He was a bit stiff on Wednesday after his last visit but fine today. Any medication changes, allergies to medications, adverse drug reactions, diagnosis change, or new procedure performed?: [x] No    [] Yes (see summary sheet for update) SEE ABOVE. OBJECTIVE        15 min Modality:      []  Ice     []  Heat       VASOPNEUMATIC x 15 minutes  Position/location:   Pt supine with wedge. Rationale: decrease pain to improve the patients ability to do functional activities   [x] Skin assessment post-treatment:  [x]intact []redness- no adverse reaction    []redness  adverse reaction:      20 min Therapeutic Exercise:  [x] See flow sheet :   S/L clam and S/L hip abduction with assistance added. Rationale: increase strength, improve coordination and increase proprioception to improve the patients ability to see below. 15 min Neuromuscular Re-education:  []  See flow sheet :   rockerboard   SLS 30 \" x 3 FOAM  Tandem on [employer] Foam.      Pt with manual assistance / cueing for S/L hip abduction / clam.           Rationale: improve coordination  to improve the patients ability to walk, negotiate stairs, tie shoes        15 min Manual Therapy:  Scar incision mobilization, STM  STM to right glute med. And piriformis muscle.            Rationale: decrease pain, increase tissue extensibility and decrease trigger points to improve the patients ability to see above. With   [] TE   [] TA   [] neuro   [] other: Patient Education: [x] Review HEP    [] Progressed/Changed HEP based on:   [] positioning   [] body mechanics   [] transfers   [] heat/ice application    [] other:        Pain Level (0-10 scale) post treatment: 0    ASSESSMENT/Changes in Function:   Pt cont. To be challenged with S/L hip strengthening (clams, hip ABD), manual assistance and cueing given throughout. Patient will continue to benefit from skilled PT services to modify and progress therapeutic interventions, address functional mobility deficits, address strength deficits, assess and modify postural abnormalities and instruct in home and community integration to attain remaining goals. Short Term Goals: To be accomplished in 2-3 weeks:              1) Pt independent in HEP from day 1 MET               2) Pt will be able to negotiate 12 steps in clinic step over step with good concentric and eccentric control without inc. Pain. MET    Long Term Goals: To be accomplished in 6-8 weeks:              1) Pt independent in final HEP. 2) Pt able to perform a floor transfer without pain or difficulty to be able to play with his grandchildren. 3) Pt able to stand for 30 \" on R LE for improved stab. In stance that is = to non operative side PROGRESSING (holds 30\" but fair stab. ). 4) Pt able to don/doff shoes/socks without inc. Pain.   progressing. PLAN      [x]  Continue plan of care. Cont.  2x/week for 4 weeks from 04/21 re-eval.     []  Other:_      Nevaeh Blunt, PT DPT, OCS 4/28/2022  3:01 PM       PT License #[id number]

## 2022-05-03 ENCOUNTER — HOSPITAL ENCOUNTER (OUTPATIENT)
Dept: PHYSICAL THERAPY | Age: 72
Discharge: HOME | End: 2022-05-03
Payer: MEDICARE

## 2022-05-03 PROCEDURE — 97112 NEUROMUSCULAR REEDUCATION: CPT | Performed by: PHYSICAL THERAPIST

## 2022-05-03 PROCEDURE — 97016 VASOPNEUMATIC DEVICE THERAPY: CPT | Performed by: PHYSICAL THERAPIST

## 2022-05-03 PROCEDURE — 97140 MANUAL THERAPY 1/> REGIONS: CPT | Performed by: PHYSICAL THERAPIST

## 2022-05-03 PROCEDURE — 97110 THERAPEUTIC EXERCISES: CPT | Performed by: PHYSICAL THERAPIST

## 2022-05-03 NOTE — PROGRESS NOTES
PT  DAILY TREATMENT NOTE - [facility] 2-15    Patient Name: Janeth Hawthorne  Date:5/3/2022  : 1950  [x]  Patient  Verified  Payor: [place] MEDICARE / Plan: [place] MEDICARE PART A & B / Product Type: Medicare /    In time: 400  Out time: 515  Total Treatment Time (min):75  Total Timed Codes (min):   60  1:1 Treatment Time ([address] only) 60        Visit #: 12    Treatment Area: Hip pain, right [M25.551]    SUBJECTIVE     Pain Level (0-10 scale), subjective functional status/changes: Pt reports 0/10 pain. Pt reports he had his grandchildren this weekend, his hip felt OK and didn't need to get on and off the floor a lot. Any medication changes, allergies to medications, adverse drug reactions, diagnosis change, or new procedure performed?: [x] No    [] Yes (see summary sheet for update) SEE ABOVE. OBJECTIVE        15 min Modality:      []  Ice     []  Heat       VASOPNEUMATIC x 15 minutes  Position/location:   Pt supine with wedge. Rationale: decrease pain to improve the patients ability to do functional activities   [x] Skin assessment post-treatment:  [x]intact []redness- no adverse reaction    []redness  adverse reaction:      30 min Therapeutic Exercise:  [x] See flow sheet :   Cable column resisted walking FW and side steps (10 reps and then 5 reps each way). Rationale: increase strength, improve coordination and increase proprioception to improve the patients ability to see below. 15 min Neuromuscular Re-education:  []  See flow sheet :   rockerboard   SLS 30 \" x 3 FOAM  Tandem on [employer] Foam.    Pt with manual assistance / cueing for S/L hip abduction / clam.       Rationale: improve coordination  to improve the patients ability to walk, negotiate stairs, tie shoes        15 min Manual Therapy:  Scar incision mobilization, STM  STM to right glute med. And piriformis muscle.             Rationale: decrease pain, increase tissue extensibility and decrease trigger points  to improve the patients ability to see above. With   [] TE   [] TA   [] neuro   [] other: Patient Education: [x] Review HEP    [] Progressed/Changed HEP based on:   [] positioning   [] body mechanics   [] transfers   [] heat/ice application    [] other:        Pain Level (0-10 scale) post treatment: 0    ASSESSMENT/Changes in Function:   Pt challenged with isolation of glute. Med in sidelying hip ABD and also with clamshells. Patient will continue to benefit from skilled PT services to modify and progress therapeutic interventions, address functional mobility deficits, address strength deficits, assess and modify postural abnormalities and instruct in home and community integration to attain remaining goals. Short Term Goals: To be accomplished in 2-3 weeks:              1) Pt independent in HEP from day 1 MET               2) Pt will be able to negotiate 12 steps in clinic step over step with good concentric and eccentric control without inc. Pain. MET    Long Term Goals: To be accomplished in 6-8 weeks:              1) Pt independent in final HEP. 2) Pt able to perform a floor transfer without pain or difficulty to be able to play with his grandchildren. 3) Pt able to stand for 30 \" on R LE for improved stab. In stance that is = to non operative side PROGRESSING (holds 30\" but fair stab. ). 4) Pt able to don/doff shoes/socks without inc. Pain.   progressing. PLAN      [x]  Continue plan of care. Cont.  2x/week for 4 weeks from 04/21 re-eval.     []  Other:_      Armida Magana, PT DPT, OCS 5/3/2022  2:29 PM       PT License #[id number]

## 2022-05-05 ENCOUNTER — HOSPITAL ENCOUNTER (OUTPATIENT)
Dept: PHYSICAL THERAPY | Age: 72
Discharge: HOME | End: 2022-05-05
Payer: MEDICARE

## 2022-05-05 PROCEDURE — 97016 VASOPNEUMATIC DEVICE THERAPY: CPT | Performed by: PHYSICAL THERAPIST

## 2022-05-05 PROCEDURE — 97140 MANUAL THERAPY 1/> REGIONS: CPT | Performed by: PHYSICAL THERAPIST

## 2022-05-05 PROCEDURE — 97112 NEUROMUSCULAR REEDUCATION: CPT | Performed by: PHYSICAL THERAPIST

## 2022-05-05 PROCEDURE — 97110 THERAPEUTIC EXERCISES: CPT | Performed by: PHYSICAL THERAPIST

## 2022-05-05 NOTE — PROGRESS NOTES
PT  DAILY TREATMENT NOTE - [facility] 2-15    Patient Name: Janeth Hawthorne  Date:2022  : 1950  [x]  Patient  Verified  Payor: VA MEDICARE / Plan: [place] MEDICARE PART A & B / Product Type: Medicare /    In time: 355  Out time: 510  Total Treatment Time (min):75  Total Timed Codes (min):   60  1:1 Treatment Time ([address] only) 38        Visit #: 13    Treatment Area: Hip pain, right [M25.551]    SUBJECTIVE     Pain Level (0-10 scale), subjective functional status/changes: Pt reports 0/10 pain, still has some discomfort with the first step. Pt reports it is getting easier to put on his shoes/socks. Any medication changes, allergies to medications, adverse drug reactions, diagnosis change, or new procedure performed?: [x] No    [] Yes (see summary sheet for update) SEE ABOVE. OBJECTIVE        15 min Modality:      []  Ice     []  Heat       VASOPNEUMATIC x 15 minutes  Position/location:   Pt supine with wedge. Rationale: decrease pain to improve the patients ability to do functional activities   [x] Skin assessment post-treatment:  [x]intact []redness- no adverse reaction    []redness  adverse reaction:      30 min Therapeutic Exercise:  [x] See flow sheet :   Cable column resisted walking FW and side steps (10 reps and then 5 reps each way) - inc. To 25 lb today. Rationale: increase strength, improve coordination and increase proprioception to improve the patients ability to see below. 20 min Neuromuscular Re-education:  []  See flow sheet :   rockerboard   SLS 30 \" x 3 FOAM  Tandem on [employer] Foam.    Pt with manual assistance / cueing for S/L hip abduction / clam.       Rationale: improve coordination  to improve the patients ability to walk, negotiate stairs, tie shoes        10 min Manual Therapy:  Scar incision mobilization, STM  STM to right glute med. And piriformis muscle, right TFL.           Rationale: decrease pain, increase tissue extensibility and decrease trigger points to improve the patients ability to see above. With   [] TE   [] TA   [] neuro   [] other: Patient Education: [x] Review HEP    [] Progressed/Changed HEP based on:   [] positioning   [] body mechanics   [] transfers   [] heat/ice application    [] other:        Pain Level (0-10 scale) post treatment: 0    ASSESSMENT/Changes in Function:   Pt did well with progression in resistance in ther. Ex. Including 6 \" to 8 \" step, inc. Resistance on c.c. with resisted side step and FW step as well as with inc. Resistance in t-band with side steps. Patient will continue to benefit from skilled PT services to modify and progress therapeutic interventions, address functional mobility deficits, address strength deficits, assess and modify postural abnormalities and instruct in home and community integration to attain remaining goals. Short Term Goals: To be accomplished in 2-3 weeks:              1) Pt independent in HEP from day 1 MET               2) Pt will be able to negotiate 12 steps in clinic step over step with good concentric and eccentric control without inc. Pain. MET    Long Term Goals: To be accomplished in 6-8 weeks:              1) Pt independent in final HEP. 2) Pt able to perform a floor transfer without pain or difficulty to be able to play with his grandchildren. 3) Pt able to stand for 30 \" on R LE for improved stab. In stance that is = to non operative side PROGRESSING (holds 30\" but fair stab. ). 4) Pt able to don/doff shoes/socks without inc. Pain.   progressing. PLAN      [x]  Continue plan of care. Cont.  2x/week for 4 weeks from 04/21 re-eval.     []  Other:_      Rosa Son, PT DPT, OCS 5/5/2022  5:11 PM       PT License #[id number]

## 2022-05-10 ENCOUNTER — HOSPITAL ENCOUNTER (OUTPATIENT)
Dept: PHYSICAL THERAPY | Age: 72
Discharge: HOME | End: 2022-05-10
Payer: MEDICARE

## 2022-05-10 PROCEDURE — 97016 VASOPNEUMATIC DEVICE THERAPY: CPT | Performed by: PHYSICAL THERAPIST

## 2022-05-10 PROCEDURE — 97110 THERAPEUTIC EXERCISES: CPT | Performed by: PHYSICAL THERAPIST

## 2022-05-10 PROCEDURE — 97112 NEUROMUSCULAR REEDUCATION: CPT | Performed by: PHYSICAL THERAPIST

## 2022-05-10 NOTE — PROGRESS NOTES
PT  DAILY TREATMENT NOTE - [facility] 2-15    Patient Name: Sallie Whalen  Date:5/10/2022  : 1950  [x]  Patient  Verified  Payor: Francoise Mauricio / Plan: [place] MEDICARE PART A & B / Product Type: Medicare /    In time: 400  Out time: 515  Total Treatment Time (min):75  Total Timed Codes (min):   60  1:1 Treatment Time ([facility] only) 60       Visit #: 14    Treatment Area: Hip pain, right [M25.551]    SUBJECTIVE     Pain Level (0-10 scale), subjective functional status/changes: Pt reports 0/10 pain. Pt reports he is feeling 80% or more back to his full function. He didn't do his exercises as much over the weekend but was working on his [id number] system in the yard. Any medication changes, allergies to medications, adverse drug reactions, diagnosis change, or new procedure performed?: [x] No    [] Yes (see summary sheet for update) SEE ABOVE. OBJECTIVE        15 min Modality:      []  Ice     []  Heat       VASOPNEUMATIC x 15 minutes  Position/location:   Pt supine with wedge. Rationale: decrease pain to improve the patients ability to do functional activities   [x] Skin assessment post-treatment:  [x]intact []redness- no adverse reaction    []redness  adverse reaction:      30 min Therapeutic Exercise:  [x] See flow sheet :        Rationale: increase strength, improve coordination and increase proprioception to improve the patients ability to see below. 25 min Neuromuscular Re-education:  []  See flow sheet :   rockerboard   SLS 30 \" x 3 FOAM  Tandem on [employer] Foam.    Pt with manual assistance / cueing for S/L hip abduction / clam.       Rationale: improve coordination  to improve the patients ability to walk, negotiate stairs, tie shoes        5 min Manual Therapy:  Scar incision mobilization, STM  STM to right glute med. And piriformis muscle, right TFL.           Rationale: decrease pain, increase tissue extensibility and decrease trigger points  to improve the patients ability to see above.           With   [] TE   [] TA   [] neuro   [] other: Patient Education: [x] Review HEP    [] Progressed/Changed HEP based on:   [] positioning   [] body mechanics   [] transfers   [] heat/ice application    [] other:        Pain Level (0-10 scale) post treatment: 0    ASSESSMENT/Changes in Function:   Pt able to lift R LE higher independently with S/L clams and S/L hip abduction. Patient will continue to benefit from skilled PT services to modify and progress therapeutic interventions, address functional mobility deficits, address strength deficits, assess and modify postural abnormalities and instruct in home and community integration to attain remaining goals. Short Term Goals: To be accomplished in 2-3 weeks:              1) Pt independent in HEP from day 1 MET               2) Pt will be able to negotiate 12 steps in clinic step over step with good concentric and eccentric control without inc. Pain. MET    Long Term Goals: To be accomplished in 6-8 weeks:              1) Pt independent in final HEP. 2) Pt able to perform a floor transfer without pain or difficulty to be able to play with his grandchildren. 3) Pt able to stand for 30 \" on R LE for improved stab. In stance that is = to non operative side PROGRESSING (holds 30\" but fair stab. ). 4) Pt able to don/doff shoes/socks without inc. Pain.   progressing. PLAN      [x]  Continue plan of care. Cont.  2x/week for 4 weeks from 04/21 re-eval.     []  Other:_      Loann Dance, PT DPT, OCS 5/10/2022  6:47 PM       PT License #[id number]

## 2022-05-12 ENCOUNTER — HOSPITAL ENCOUNTER (OUTPATIENT)
Dept: PHYSICAL THERAPY | Age: 72
Discharge: HOME | End: 2022-05-12
Payer: MEDICARE

## 2022-05-12 PROCEDURE — 97112 NEUROMUSCULAR REEDUCATION: CPT | Performed by: PHYSICAL THERAPIST

## 2022-05-12 PROCEDURE — 97110 THERAPEUTIC EXERCISES: CPT | Performed by: PHYSICAL THERAPIST

## 2022-05-12 PROCEDURE — 97140 MANUAL THERAPY 1/> REGIONS: CPT | Performed by: PHYSICAL THERAPIST

## 2022-05-12 PROCEDURE — 97016 VASOPNEUMATIC DEVICE THERAPY: CPT | Performed by: PHYSICAL THERAPIST

## 2022-05-12 NOTE — PROGRESS NOTES
PT  DAILY TREATMENT NOTE - [facility] 2-15    Patient Name: Krishna Bautista  Date:2022  : 1950  [x]  Patient  Verified  Payor: Anita Rocha / Plan: [place] MEDICARE PART A & B / Product Type: Medicare /    In time: 400  Out time: 515  Total Treatment Time (min):75  Total Timed Codes (min):   60  1:1 Treatment Time ([address] only) 60       Visit #: 15    Treatment Area: Hip pain, right [M25.551]    SUBJECTIVE     Pain Level (0-10 scale), subjective functional status/changes: Pt reports 0/10 pain. Pt reports he had some muscle soreness after the last visit but then that went away. Any medication changes, allergies to medications, adverse drug reactions, diagnosis change, or new procedure performed?: [x] No    [] Yes (see summary sheet for update) SEE ABOVE. OBJECTIVE        15 min Modality:      []  Ice     []  Heat       VASOPNEUMATIC x 15 minutes  Position/location:   Pt supine with wedge. Rationale: decrease pain to improve the patients ability to do functional activities   [x] Skin assessment post-treatment:  [x]intact []redness- no adverse reaction    []redness  adverse reaction:      30 min Therapeutic Exercise:  [x] See flow sheet :        Rationale: increase strength, improve coordination and increase proprioception to improve the patients ability to see below. 20 min Neuromuscular Re-education:  []  See flow sheet :   rockerboard   SLS 30 \" x 3 FOAM  Tandem walking at the wall 3 laps  Carioca walking (modified) at the wall 3 laps. Pt with manual assistance / cueing for S/L hip abduction / clam.       Rationale: improve coordination  to improve the patients ability to walk, negotiate stairs, tie shoes        10 min Manual Therapy:  Scar incision mobilization, STM  STM to right glute med. right TFL. Rationale: decrease pain, increase tissue extensibility and decrease trigger points  to improve the patients ability to see above.            With   [] TE   [] TA   [] neuro   [] other: Patient Education: [x] Review HEP    [] Progressed/Changed HEP based on:   [] positioning   [] body mechanics   [] transfers   [] heat/ice application    [] other:        Pain Level (0-10 scale) post treatment: 0    ASSESSMENT/Changes in Function:   Pt challenged with coordinating carioca walk. Patient will continue to benefit from skilled PT services to modify and progress therapeutic interventions, address functional mobility deficits, address strength deficits, assess and modify postural abnormalities and instruct in home and community integration to attain remaining goals. Short Term Goals: To be accomplished in 2-3 weeks:              1) Pt independent in HEP from day 1 MET               2) Pt will be able to negotiate 12 steps in clinic step over step with good concentric and eccentric control without inc. Pain. MET    Long Term Goals: To be accomplished in 6-8 weeks:              1) Pt independent in final HEP. 2) Pt able to perform a floor transfer without pain or difficulty to be able to play with his grandchildren. 3) Pt able to stand for 30 \" on R LE for improved stab. In stance that is = to non operative side PROGRESSING (holds 30\" but fair stab. ). 4) Pt able to don/doff shoes/socks without inc. Pain.   progressing. PLAN      [x]  Continue plan of care. Cont.  2x/week for 4 weeks from 04/21 re-eval.     []  Other:_      Omid Mccormack, PT DPT, OCS 5/12/2022  2:33 PM       PT License #[id number]

## 2022-05-17 ENCOUNTER — APPOINTMENT (OUTPATIENT)
Dept: PHYSICAL THERAPY | Age: 72
End: 2022-05-17
Payer: MEDICARE

## 2022-05-19 ENCOUNTER — HOSPITAL ENCOUNTER (OUTPATIENT)
Dept: PHYSICAL THERAPY | Age: 72
Discharge: HOME | End: 2022-05-19
Payer: MEDICARE

## 2022-05-19 PROCEDURE — 97112 NEUROMUSCULAR REEDUCATION: CPT | Performed by: PHYSICAL THERAPIST

## 2022-05-19 PROCEDURE — 97110 THERAPEUTIC EXERCISES: CPT | Performed by: PHYSICAL THERAPIST

## 2022-05-19 PROCEDURE — 97016 VASOPNEUMATIC DEVICE THERAPY: CPT | Performed by: PHYSICAL THERAPIST

## 2022-05-19 PROCEDURE — 97140 MANUAL THERAPY 1/> REGIONS: CPT | Performed by: PHYSICAL THERAPIST

## 2022-05-19 NOTE — PROGRESS NOTES
PT  DAILY TREATMENT NOTE - [facility] 2-15    Patient Name: Ghulam Ser  Date:2022  : 1950  [x]  Patient  Verified  Payor: Iza Alvarenga / Plan: [place] / Product Type: Medicare /    In time: 425  Out time: 550   Total Treatment Time (min): 85   Total Timed Codes (min):   65  1:1 Treatment Time ([address] only) 45       Visit #: 16    Treatment Area: Hip pain, right [M25.551]    SUBJECTIVE     Pain Level (0-10 scale), subjective functional status/changes: Pt reports 0/10 pain. Pt reports he had some muscle soreness after the last visit , that lasted about 24 hr and then went away. Any medication changes, allergies to medications, adverse drug reactions, diagnosis change, or new procedure performed?: [x] No    [] Yes (see summary sheet for update) SEE ABOVE. OBJECTIVE      15 min Modality:      []  Ice     []  Heat       VASOPNEUMATIC x 15 minutes  Position/location:   Pt supine with wedge. Rationale: decrease pain to improve the patients ability to do functional activities   [x] Skin assessment post-treatment:  [x]intact []redness- no adverse reaction    []redness  adverse reaction:      35 min Therapeutic Exercise:  [x] See flow sheet :        Rationale: increase strength, improve coordination and increase proprioception to improve the patients ability to see below. 20 min Neuromuscular Re-education:  []  See flow sheet :   rockerboard   SLS 30 \" x 3 FOAM  Tandem walking at the wall 3 laps  Carioca walking (modified) at the wall 3 laps. Pt with manual assistance / cueing for S/L hip abduction / clam.       Rationale: improve coordination  to improve the patients ability to walk, negotiate stairs, tie shoes        10 min Manual Therapy:  Scar incision mobilization, STM  STM to right glute med. right TFL. Rationale: decrease pain, increase tissue extensibility and decrease trigger points  to improve the patients ability to see above.            With   [] TE [] TA   [] neuro   [] other: Patient Education: [x] Review HEP    [] Progressed/Changed HEP based on:   [] positioning   [] body mechanics   [] transfers   [] heat/ice application    [] other:        Pain Level (0-10 scale) post treatment: 0    ASSESSMENT/Changes in Function:   Pt doing well, progress noted with concentric portion / eccentric portion of clamshells. Patient will continue to benefit from skilled PT services to modify and progress therapeutic interventions, address functional mobility deficits, address strength deficits, assess and modify postural abnormalities and instruct in home and community integration to attain remaining goals. Short Term Goals: To be accomplished in 2-3 weeks:              1) Pt independent in HEP from day 1 MET               2) Pt will be able to negotiate 12 steps in clinic step over step with good concentric and eccentric control without inc. Pain. MET    Long Term Goals: To be accomplished in 6-8 weeks:              1) Pt independent in final HEP. 2) Pt able to perform a floor transfer without pain or difficulty to be able to play with his grandchildren. 3) Pt able to stand for 30 \" on R LE for improved stab. In stance that is = to non operative side PROGRESSING (holds 30\" but fair stab. ). 4) Pt able to don/doff shoes/socks without inc. Pain.   progressing. PLAN      [x]  Continue plan of care. Cont.  2x/week for 4 weeks from 04/21 re-eval.   * re-eval next visit *     []  Other:_      Mack Lemon, PT DPT, OCS 5/19/2022  5:42 PM       PT License #[id number]

## 2022-05-24 ENCOUNTER — HOSPITAL ENCOUNTER (OUTPATIENT)
Dept: PHYSICAL THERAPY | Age: 72
Discharge: HOME | End: 2022-05-24
Payer: MEDICARE

## 2022-05-24 PROCEDURE — 97112 NEUROMUSCULAR REEDUCATION: CPT | Performed by: PHYSICAL THERAPIST

## 2022-05-24 PROCEDURE — 97110 THERAPEUTIC EXERCISES: CPT | Performed by: PHYSICAL THERAPIST

## 2022-05-24 PROCEDURE — 97016 VASOPNEUMATIC DEVICE THERAPY: CPT | Performed by: PHYSICAL THERAPIST

## 2022-05-24 NOTE — PROGRESS NOTES
PT  DAILY TREATMENT NOTE - [facility] 2-15    Patient Name: Nader Don  Date:2022  : 1950  [x]  Patient  Verified  Payor: Ashley Mejia / Plan: [place] MEDICARE PART A & B / Product Type: Medicare /    In time: 450  Out time: 535   Total Treatment Time (min): 45   Total Timed Codes (min):   30  1:1 Treatment Time ([facility] only) 23        Visit #: 17    Treatment Area: Hip pain, right [M25.551]    SUBJECTIVE     Pain Level (0-10 scale), subjective functional status/changes: Pt reports 0/10 pain, he walked for an hour and 1/2 this morning. Pt reports he feels some warmth in his hip since doing that this morning. Any medication changes, allergies to medications, adverse drug reactions, diagnosis change, or new procedure performed?: [x] No    [] Yes (see summary sheet for update) SEE ABOVE. OBJECTIVE      15 min Modality:      []  Ice     []  Heat       VASOPNEUMATIC x 15 minutes  Position/location:   Pt supine with wedge. Rationale: decrease pain to improve the patients ability to do functional activities   [x] Skin assessment post-treatment:  [x]intact []redness- no adverse reaction    []redness  adverse reaction:      15 min Therapeutic Exercise:  [x] See flow sheet :        Rationale: increase strength, improve coordination and increase proprioception to improve the patients ability to see below. 15 min Neuromuscular Re-education:  []  See flow sheet :   rockerboard   SLS 30 \" x 3 FOAM  OMIT Tandem walking at the wall 3 laps  OMIT Carioca walking (modified) at the wall 3 laps. Pt with manual assistance / cueing for S/L hip abduction / clam.       Rationale: improve coordination  to improve the patients ability to walk, negotiate stairs, tie shoes        0 min Manual Therapy:  Scar incision mobilization, STM  STM to right glute med. right TFL. Rationale: decrease pain, increase tissue extensibility and decrease trigger points  to improve the patients ability to see above. With   [] TE   [] TA   [] neuro   [] other: Patient Education: [x] Review HEP    [] Progressed/Changed HEP based on:   [] positioning   [] body mechanics   [] transfers   [] heat/ice application    [] other:        Pain Level (0-10 scale) post treatment: 0    ASSESSMENT/Changes in Function:   Pt 20 ' late which limited time in session today. Pt able to lift R LE more indep. With S/L hip ER and ABD. Patient will continue to benefit from skilled PT services to modify and progress therapeutic interventions, address functional mobility deficits, address strength deficits, assess and modify postural abnormalities and instruct in home and community integration to attain remaining goals. Short Term Goals: To be accomplished in 2-3 weeks:              1) Pt independent in HEP from day 1 MET               2) Pt will be able to negotiate 12 steps in clinic step over step with good concentric and eccentric control without inc. Pain. MET    Long Term Goals: To be accomplished in 6-8 weeks:              1) Pt independent in final HEP. 2) Pt able to perform a floor transfer without pain or difficulty to be able to play with his grandchildren. 3) Pt able to stand for 30 \" on R LE for improved stab. In stance that is = to non operative side PROGRESSING (holds 30\" but fair stab. ). 4) Pt able to don/doff shoes/socks without inc. Pain.   progressing. PLAN      [x]  Continue plan of care. Cont. 2x/week for 4 weeks from 04/21 re-eval.   * re-eval next visit * (*did not re-eval today as pt was 20 ' late to appt).      []  Other:_      Linda Fuentes, PT DPT, OCS 5/24/2022  8:25 PM       PT License #[id number]

## 2022-05-26 ENCOUNTER — HOSPITAL ENCOUNTER (OUTPATIENT)
Dept: PHYSICAL THERAPY | Age: 72
Discharge: HOME | End: 2022-05-26
Payer: MEDICARE

## 2022-05-26 PROCEDURE — 97110 THERAPEUTIC EXERCISES: CPT | Performed by: PHYSICAL THERAPIST

## 2022-05-26 PROCEDURE — 97016 VASOPNEUMATIC DEVICE THERAPY: CPT | Performed by: PHYSICAL THERAPIST

## 2022-05-26 PROCEDURE — 97112 NEUROMUSCULAR REEDUCATION: CPT | Performed by: PHYSICAL THERAPIST

## 2022-05-26 PROCEDURE — 97140 MANUAL THERAPY 1/> REGIONS: CPT | Performed by: PHYSICAL THERAPIST

## 2022-05-26 NOTE — PROGRESS NOTES
PT  DAILY TREATMENT NOTE - [facility] 2-15    Patient Name: Ghulam Ser  Date:2022  : 1950  [x]  Patient  Verified  Payor: Iza Alvarenga / Plan: [place] MEDICARE PART A & B / Product Type: Medicare /    In time: 430  Out time: 530   Total Treatment Time (min): 60   Total Timed Codes (min):   45  1:1 Treatment Time ([address] only) 45        Visit #: 18    Treatment Area: Hip pain, right [M25.551]    SUBJECTIVE     Pain Level (0-10 scale), subjective functional status/changes: Pt reports 0/10 pain. Pt reports he had some soreness after the last visit but it lasted just the evening after PT. Any medication changes, allergies to medications, adverse drug reactions, diagnosis change, or new procedure performed?: [x] No    [] Yes (see summary sheet for update) SEE ABOVE. OBJECTIVE      15 min Modality:      []  Ice     []  Heat       VASOPNEUMATIC x 15 minutes  Position/location:   Pt supine with wedge. Rationale: decrease pain to improve the patients ability to do functional activities   [x] Skin assessment post-treatment:  [x]intact []redness- no adverse reaction    []redness  adverse reaction:      20 min Therapeutic Exercise:  [x] See flow sheet :        Rationale: increase strength, improve coordination and increase proprioception to improve the patients ability to see below. 15 min Neuromuscular Re-education:  []  See flow sheet :   rockerboard   SLS 30 \" x 3 FOAM  OMIT Tandem walking at the wall 3 laps  OMIT Carioca walking (modified) at the wall 3 laps. Pt with manual assistance / cueing for S/L hip abduction / clam.       Rationale: improve coordination  to improve the patients ability to walk, negotiate stairs, tie shoes        10 min Manual Therapy:  Scar incision mobilization, STM to right glute med/max. Rationale: decrease pain, increase tissue extensibility and decrease trigger points  to improve the patients ability to see above.            With   [] TE   [] TA   [] neuro   [] other: Patient Education: [x] Review HEP    [] Progressed/Changed HEP based on:   [] positioning   [] body mechanics   [] transfers   [] heat/ice application    [] other:        Pain Level (0-10 scale) post treatment: 0    ASSESSMENT/Changes in Function:   Pt did well with addition of forward lunge onto BOSU, no inc. In pain in session. Patient will continue to benefit from skilled PT services to modify and progress therapeutic interventions, address functional mobility deficits, address strength deficits, assess and modify postural abnormalities and instruct in home and community integration to attain remaining goals. Short Term Goals: To be accomplished in 2-3 weeks:              1) Pt independent in HEP from day 1 MET               2) Pt will be able to negotiate 12 steps in clinic step over step with good concentric and eccentric control without inc. Pain. MET    Long Term Goals: To be accomplished in 6-8 weeks:              1) Pt independent in final HEP. 2) Pt able to perform a floor transfer without pain or difficulty to be able to play with his grandchildren. 3) Pt able to stand for 30 \" on R LE for improved stab. In stance that is = to non operative side PROGRESSING (holds 30\" but fair stab. ). 4) Pt able to don/doff shoes/socks without inc. Pain.   progressing. PLAN      [x]  Continue plan of care. Cont.  2x/week for 4 weeks from 04/21 re-eval.   * re-eval next visit *     []  Other:_      Rosa Maria Wetzel, PT DPT, OCS 5/26/2022  6:09 PM       PT License #[id number]

## 2022-05-31 ENCOUNTER — HOSPITAL ENCOUNTER (OUTPATIENT)
Dept: PHYSICAL THERAPY | Age: 72
Discharge: HOME | End: 2022-05-31
Payer: MEDICARE

## 2022-05-31 PROCEDURE — 97140 MANUAL THERAPY 1/> REGIONS: CPT | Performed by: PHYSICAL THERAPIST

## 2022-05-31 PROCEDURE — 97110 THERAPEUTIC EXERCISES: CPT | Performed by: PHYSICAL THERAPIST

## 2022-05-31 PROCEDURE — 97016 VASOPNEUMATIC DEVICE THERAPY: CPT | Performed by: PHYSICAL THERAPIST

## 2022-05-31 NOTE — PROGRESS NOTES
PT  Discharge Summary / DAILY TREATMENT NOTE - [facility] 215    Patient Name: José Luis Garcia  Date:2022  : 1950  [x]  Patient  Verified  Payor: Shasha Trinidad / Plan: [place] MEDICARE PART A & B / Product Type: Medicare /    In time: 430  Out time: 545  Total Treatment Time (min): 75   Total Timed Codes (min):   55  1:1 Treatment Time ([address] only) 24        Visit #: 19    Treatment Area: Hip pain, right [M25.551]    SUBJECTIVE     Pain Level (0-10 scale), subjective functional status/changes: Pt reports 0/10 pain. Pt reports he is having less difficulty to don/doff shoes and socks and is able to transfer on and off of the floor. Any medication changes, allergies to medications, adverse drug reactions, diagnosis change, or new procedure performed?: [x] No    [] Yes (see summary sheet for update) SEE ABOVE. OBJECTIVE       ROM:  R hip flexion seated 105 deg. R hip ER seated approx 28 deg  R hip IR seated approx 22 deg     Measured last re-eval.   L hip ER seated 35 deg  L hip IR seated 24 deg     Strength:  R hip flexion 5/5 avail range  R hip ER 5/5  R hip IR 5/5  (tested in seated position)  S/L R hip ER:  2+/5 to 3/5 avail range  S/L R hip ABD / glute med:  3/5      Balance:  SLS: able to hold 30 sec.        15 min Modality:      []  Ice     []  Heat       VASOPNEUMATIC x 15 minutes  Position/location:   Pt supine with wedge. Rationale: decrease pain to improve the patients ability to do functional activities   [x] Skin assessment post-treatment:  [x]intact []redness- no adverse reaction    []redness - adverse reaction:      30 min Therapeutic Exercise:  [x] See flow sheet :     Re-eval as noted above. Rationale: increase strength, improve coordination and increase proprioception to improve the patients ability to see below.       15 min Neuromuscular Re-education:  []  See flow sheet :   rockerboard   SLS 30 \" x 3 FOAM      Pt with manual assistance / cueing for S/L hip abduction / clam. Rationale: improve coordination  to improve the patients ability to walk, negotiate stairs, tie shoes        10 min Manual Therapy:  Scar incision mobilization, STM to right glute med/max. Rationale: decrease pain, increase tissue extensibility and decrease trigger points  to improve the patients ability to see above. With   [] TE   [] TA   [] neuro   [] other: Patient Education: [x] Review HEP    [] Progressed/Changed HEP based on:   [] positioning   [] body mechanics   [] transfers   [] heat/ice application    [] other:        Pain Level (0-10 scale) post treatment: 0    ASSESSMENT/Changes in Function:   Pt with 19 skilled PT visits from 03/15 through 05/31. Pt has shown improvements in quality of his gait (no deviations at discharge observed), improved strength and improved ROM. Pt has met 100% of his goals. Pt very active in the yard and with his grandchildren and continues to work full time and progressed well in higher level strengthening as well as balance / neuro re-ed exercises. Pt independent in final HEP and ready for D/C to HEP. Short Term Goals: To be accomplished in 2-3 weeks:              1) Pt independent in HEP from day 1 MET               2) Pt will be able to negotiate 12 steps in clinic step over step with good concentric and eccentric control without inc. Pain. MET    Long Term Goals: To be accomplished in 6-8 weeks:              1) Pt independent in final HEP. MET               2) Pt able to perform a floor transfer without pain or difficulty to be able to play with his grandchildren. MET               3) Pt able to stand for 30 \" on R LE for improved stab. In stance that is = to non operative side MET               4) Pt able to don/doff shoes/socks without inc. Pain. MET                      PLAN      [x]  D/C to HEP.      []  Other:_      Aleksey Tee, PT DPT, OCS 5/31/2022  8:53 PM       PT License #[id number]

## 2022-06-09 NOTE — ANCILLARY DISCHARGE INSTRUCTIONS
PT  Discharge Summary- [facility] 2-15     Patient Name: Yashira Varma  Date:2022  : 1950        Visit #: 19     Treatment Area: Hip pain, right [M25.551]     SUBJECTIVE      Pain Level (0-10 scale), subjective functional status/changes: Pt reports 0/10 pain. Pt reports he is having less difficulty to don/doff shoes and socks and is able to transfer on and off of the floor.       OBJECTIVE      ROM:  R hip flexion seated 105 deg. R hip ER seated approx 28 deg  R hip IR seated approx 22 deg     Measured last re-eval.   L hip ER seated 35 deg  L hip IR seated 24 deg     Strength:  R hip flexion 5/5 avail range  R hip ER 5/5  R hip IR 5/5  (tested in seated position)  S/L R hip ER:  2+/5 to 3/5 avail range  S/L R hip ABD / glute med:  3/5      Balance:  SLS: able to hold 30 sec.     ASSESSMENT/Changes in Function:   Pt with 19 skilled PT visits from 03/15 through . Pt has shown improvements in quality of his gait (no deviations at discharge observed), improved strength and improved ROM. Pt has met 100% of his goals. Pt very active in the yard and with his grandchildren and continues to work full time and progressed well in higher level strengthening as well as balance / neuro re-ed exercises. Pt independent in final HEP and ready for D/C to HEP.           Short Term Goals: To be accomplished in 2-3 weeks:              1) Pt independent in HEP from day 1 MET               2) Pt will be able to negotiate 12 steps in clinic step over step with good concentric and eccentric control without inc. Pain. MET     Long Term Goals: To be accomplished in 6-8 weeks:              1) Pt independent in final HEP. MET   [id number]) Pt able to perform a floor transfer without pain or difficulty to be able to play with his grandchildren. MET               3) Pt able to stand for 30 \" on R LE for improved stab.  In stance that is = to non operative side MET               4) Pt able to don/doff shoes/socks without inc. Pain.   MET                    PLAN      [x]? D/C to HEP.      []?   Other:_      Deshawn Grams PT, DPT, OCS

## 2022-12-16 ENCOUNTER — OFFICE VISIT (OUTPATIENT)
Dept: INTERNAL MEDICINE CLINIC | Age: 72
End: 2022-12-16
Payer: MEDICARE

## 2022-12-16 VITALS
RESPIRATION RATE: 16 BRPM | SYSTOLIC BLOOD PRESSURE: 136 MMHG | OXYGEN SATURATION: 96 % | BODY MASS INDEX: 25.69 KG/M2 | DIASTOLIC BLOOD PRESSURE: 84 MMHG | HEIGHT: 73 IN | HEART RATE: 76 BPM | TEMPERATURE: 97.9 F | WEIGHT: 193.8 LBS

## 2022-12-16 DIAGNOSIS — Z12.5 SCREENING PSA (PROSTATE SPECIFIC ANTIGEN): ICD-10-CM

## 2022-12-16 DIAGNOSIS — K64.9 HEMORRHOIDS, UNSPECIFIED HEMORRHOID TYPE: ICD-10-CM

## 2022-12-16 DIAGNOSIS — R73.01 IFG (IMPAIRED FASTING GLUCOSE): ICD-10-CM

## 2022-12-16 DIAGNOSIS — E78.2 MIXED HYPERLIPIDEMIA: ICD-10-CM

## 2022-12-16 DIAGNOSIS — Z00.00 ENCOUNTER FOR SUBSEQUENT ANNUAL WELLNESS VISIT (AWV) IN MEDICARE PATIENT: Primary | ICD-10-CM

## 2022-12-16 PROBLEM — Z86.69 HISTORY OF RETINAL TEAR: Status: ACTIVE | Noted: 2022-12-16

## 2022-12-16 PROBLEM — M16.11 PRIMARY LOCALIZED OSTEOARTHRITIS OF RIGHT HIP: Status: RESOLVED | Noted: 2022-02-09 | Resolved: 2022-12-16

## 2022-12-16 LAB
ALBUMIN SERPL-MCNC: 4 G/DL (ref 3.5–5)
ALBUMIN/GLOB SERPL: 1.3 {RATIO} (ref 1.1–2.2)
ALP SERPL-CCNC: 69 U/L (ref 45–117)
ALT SERPL-CCNC: 29 U/L (ref 12–78)
ANION GAP SERPL CALC-SCNC: 5 MMOL/L (ref 5–15)
AST SERPL-CCNC: 32 U/L (ref 15–37)
BASOPHILS # BLD: 0 K/UL (ref 0–0.1)
BASOPHILS NFR BLD: 1 % (ref 0–1)
BILIRUB SERPL-MCNC: 0.7 MG/DL (ref 0.2–1)
BUN SERPL-MCNC: 19 MG/DL (ref 6–20)
BUN/CREAT SERPL: 17 (ref 12–20)
CALCIUM SERPL-MCNC: 8.4 MG/DL (ref 8.5–10.1)
CHLORIDE SERPL-SCNC: 107 MMOL/L (ref 97–108)
CHOLEST SERPL-MCNC: 160 MG/DL
CO2 SERPL-SCNC: 28 MMOL/L (ref 21–32)
CREAT SERPL-MCNC: 1.09 MG/DL (ref 0.7–1.3)
DIFFERENTIAL METHOD BLD: NORMAL
EOSINOPHIL # BLD: 0.2 K/UL (ref 0–0.4)
EOSINOPHIL NFR BLD: 3 % (ref 0–7)
ERYTHROCYTE [DISTWIDTH] IN BLOOD BY AUTOMATED COUNT: 13.9 % (ref 11.5–14.5)
EST. AVERAGE GLUCOSE BLD GHB EST-MCNC: 114 MG/DL
GLOBULIN SER CALC-MCNC: 3.2 G/DL (ref 2–4)
GLUCOSE SERPL-MCNC: 95 MG/DL (ref 65–100)
HBA1C MFR BLD: 5.6 % (ref 4–5.6)
HCT VFR BLD AUTO: 45.2 % (ref 36.6–50.3)
HDLC SERPL-MCNC: 67 MG/DL
HDLC SERPL: 2.4 {RATIO} (ref 0–5)
HGB BLD-MCNC: 14.3 G/DL (ref 12.1–17)
IMM GRANULOCYTES # BLD AUTO: 0 K/UL (ref 0–0.04)
IMM GRANULOCYTES NFR BLD AUTO: 0 % (ref 0–0.5)
LDLC SERPL CALC-MCNC: 76.8 MG/DL (ref 0–100)
LYMPHOCYTES # BLD: 2.8 K/UL (ref 0.8–3.5)
LYMPHOCYTES NFR BLD: 44 % (ref 12–49)
MCH RBC QN AUTO: 30.8 PG (ref 26–34)
MCHC RBC AUTO-ENTMCNC: 31.6 G/DL (ref 30–36.5)
MCV RBC AUTO: 97.4 FL (ref 80–99)
MONOCYTES # BLD: 0.5 K/UL (ref 0–1)
MONOCYTES NFR BLD: 9 % (ref 5–13)
NEUTS SEG # BLD: 2.7 K/UL (ref 1.8–8)
NEUTS SEG NFR BLD: 43 % (ref 32–75)
NRBC # BLD: 0 K/UL (ref 0–0.01)
NRBC BLD-RTO: 0 PER 100 WBC
PLATELET # BLD AUTO: 223 K/UL (ref 150–400)
PMV BLD AUTO: 10.6 FL (ref 8.9–12.9)
POTASSIUM SERPL-SCNC: 4.3 MMOL/L (ref 3.5–5.1)
PROT SERPL-MCNC: 7.2 G/DL (ref 6.4–8.2)
PSA SERPL-MCNC: 1.4 NG/ML (ref 0.01–4)
RBC # BLD AUTO: 4.64 M/UL (ref 4.1–5.7)
SODIUM SERPL-SCNC: 140 MMOL/L (ref 136–145)
TRIGL SERPL-MCNC: 81 MG/DL (ref ?–150)
VLDLC SERPL CALC-MCNC: 16.2 MG/DL
WBC # BLD AUTO: 6.3 K/UL (ref 4.1–11.1)

## 2022-12-16 NOTE — ASSESSMENT & PLAN NOTE
Lab Results   Component Value Date/Time    LDL, calculated 82 12/20/2021 09:09 AM    LDL, calculated 70 12/10/2020 08:47 AM       Tolerating statin therapy, plan to continue current regimen pending lab results  Recheck labs to assess for response to medication, whether LDL is at target, and monitor for side effects

## 2022-12-16 NOTE — PATIENT INSTRUCTIONS
Medicare Wellness Visit, Male    The best way to live healthy is to have a lifestyle where you eat a well-balanced diet, exercise regularly, limit alcohol use, and quit all forms of tobacco/nicotine, if applicable. Regular preventive services are another way to keep healthy. Preventive services (vaccines, screening tests, monitoring & exams) can help personalize your care plan, which helps you manage your own care. Screening tests can find health problems at the earliest stages, when they are easiest to treat. [facility] follows the current, evidence-based guidelines published by the [facility] Hilario Kendra ([facility]) when recommending preventive services for our patients. Because we follow these guidelines, sometimes recommendations change over time as research supports it. (For example, a prostate screening blood test is no longer routinely recommended for men with no symptoms). Of course, you and your doctor may decide to screen more often for some diseases, based on your risk and co-morbidities (chronic disease you are already diagnosed with). Preventive services for you include:  - Medicare offers their members a free annual wellness visit, which is time for you and your primary care provider to discuss and plan for your preventive service needs.  Take advantage of this benefit every year!    -Over the age of 72 should receive the recommended pneumonia vaccines.   -All adults should have a flu vaccine yearly.  -All adults should receive a tetanus vaccine every 10 years.  -Over the age of 48 should receive the shingles vaccines.    -All adults should be screened once for Hepatitis C.  -All adults age 38-68 who are overweight should have a diabetes screening test once every three years.   -Other screening tests & preventive services for persons with diabetes include: an eye exam to screen for diabetic retinopathy, a kidney function test, a foot exam, and stricter control over your cholesterol.   -Cardiovascular screening for adults with routine risk involves an electrocardiogram (ECG) at intervals determined by the provider.     -Colorectal cancer screening should be done for adults age 43-69 with no increased risk factors for colorectal cancer. There are a number of acceptable methods of screening for this type of cancer. Each test has its own benefits and drawbacks. Discuss with your provider what is most appropriate for you during your annual wellness visit. The different tests include: colonoscopy (considered the best screening method), a fecal occult blood test, a fecal DNA test, and sigmoidoscopy.    -Lung cancer screening is recommended annually with a low dose CT scan for adults between age 54 and 68, who have smoked at least 30 pack years (equivalent of 1 pack per day for 30 days), and who is a current smoker or quit less than 15 years ago. -An Abdominal Aortic Aneurysm (AAA) Screening is recommended for men age 73-68 who has ever smoked in their lifetime.      Here is a list of your current Health Maintenance items (your personalized list of preventive services) with a due date:  Health Maintenance Due   Topic Date Due    Colorectal Screening  11/19/2022    Cholesterol Test   12/20/2022

## 2022-12-16 NOTE — ASSESSMENT & PLAN NOTE
He plans to see Dr Paula Sosa for eval of hemorrhoids and may also discuss with him screening colonoscopy

## 2022-12-16 NOTE — PROGRESS NOTES
12/16/2022     Sudha Goel is here today for    Annual Wellness Visit- Subsequent Visit    Assessment & Plan:   Below is the assessment and plan developed based on review of pertinent history, physical exam, labs, studies, and medications. 1. Encounter for subsequent annual wellness visit (AWV) in Medicare patient  2. Mixed hyperlipidemia  Assessment & Plan:  Lab Results   Component Value Date/Time    LDL, calculated 82 12/20/2021 09:09 AM    LDL, calculated 70 12/10/2020 08:47 AM       Tolerating statin therapy, plan to continue current regimen pending lab results  Recheck labs to assess for response to medication, whether LDL is at target, and monitor for side effects    Orders:  -     CBC WITH AUTOMATED DIFF; Future  -     METABOLIC PANEL, COMPREHENSIVE; Future  -     LIPID PANEL; Future  3. Hemorrhoids, unspecified hemorrhoid type  Assessment & Plan:  He plans to see Dr Rainer Corona for eval of hemorrhoids and may also discuss with him screening colonoscopy  Orders:  -     REFERRAL TO COLON AND RECTAL SURGERY  4. IFG (impaired fasting glucose)  Assessment & Plan:  Family history of diabetes, will continue monitoring A1C  Orders:  -     HEMOGLOBIN A1C WITH EAG; Future  5. Screening PSA (prostate specific antigen)  -     PSA SCREENING (SCREENING); Future       Follow-up and Dispositions    Return in 1 year (on 12/16/2023) for medicare wellness. Subjective: In addition to AWV, we followed up on chronic conditions as detailed above    Additional concerns: hemorrhoids, occasional bleeding and soilage, was previously referred to Dr Rainer Corona    Diet and exercise habits: balanced diet but some more ice cream lately, stays active walking at work and doing yardwork    End of Life Planning: This was discussed with him today and he has NO advanced directive  - add't info provided. Reviewed DNR/DNI and patient is not interested.       Depression Screen:  3 most recent PHQ Screens 12/16/2022   Little interest or pleasure in doing things Not at all   Feeling down, depressed, irritable, or hopeless Not at all   Total Score PHQ 2 0         Fall Risk:   Fall Risk Assessment, last 12 mths 12/16/2022   Able to walk? Yes   Fall in past 12 months? 0   Do you feel unsteady? 0   Are you worried about falling 0       Abuse Screen:  Abuse Screening Questionnaire 12/14/2022   Do you ever feel afraid of your partner? N   Are you in a relationship with someone who physically or mentally threatens you? N   Is it safe for you to go home? Y       Do you average more than 1 drink per night or more than 7 drinks a week: No    In the past three months have you have had more than 4 drinks containing alcohol on one occasion: No            Functional Ability and Level of Safety:   Hearing:  Hearing: (P) additional comments below  Hearing comments: (P) Slight hearing loss in Right ear for many years. Do not wear hearing aid. Cognition Screen:  Has your family/caregiver stated any concerns about your memory?: (P) No  Cognitive Screening: Normal - Clock Drawing Test    Ambulation:  Patient ambulates: (P) with no difficulty  Activities of Daily Living: The home contains: (P) no safety equipment  Functional ADLs: (P) Patient does total self care      Adult Nutrition Screen:  No risk factors noted.      Health Maintenance:     Health Maintenance   Topic Date Due    Colorectal Cancer Screening Combo  11/19/2022    Lipid Screen  12/20/2022    Depression Screen  12/14/2023    Medicare Yearly Exam  12/17/2023    DTaP/Tdap/Td series (3 - Td or Tdap) 09/17/2024    Hepatitis C Screening  Completed    Shingrix Vaccine Age 50>  Completed    Flu Vaccine  Completed    COVID-19 Vaccine  Completed    Pneumococcal 65+ years  Completed       Immunization History   Administered Date(s) Administered    COVID-19, MODERNA BLUE border, Primary or Immunocompromised, (age 18y+), IM, 100 mcg/0.5mL 01/28/2021, 02/25/2021, 11/16/2021    COVID-19, MODERNA Bivalent BOOSTER, (age 18y+), IM, 50 mcg/0.5 mL 10/01/2022    Influenza High Dose Vaccine PF 12/02/2015, 01/03/2018    Influenza Vaccine 11/05/2019, 09/29/2021, 10/01/2022    Influenza Vaccine Split 11/07/2011, 11/08/2012    Influenza, FLUAD, (age 72 y+), Adjuvanted 10/29/2020    Influenza, FLUARIX, FLULAVAL, FLUZONE (age 10 mo+) AND AFLURIA, (age 1 y+), PF, 0.5mL 09/25/2014    Pneumococcal Conjugate (PCV-13) 12/30/2016    Pneumococcal Polysaccharide (PPSV-23) 11/12/2018    TDAP Vaccine 11/07/2011    Tdap 09/17/2014    Zoster 11/15/2011    Zoster Recombinant 01/01/2022, 09/01/2022        Immunizations:   Covid: up to date   Influenza: up to date   Tetanus: up to date   Shingles: up to date   Pneumonia: up to date  Cancer screening:   Prostate: guidelines reviewed, will do today  Colon: guidelines reviewed, not up to date - discussed    Objective:   Physical Exam  Constitutional:       Appearance: Normal appearance. He is not ill-appearing. HENT:      Head: Normocephalic and atraumatic. Right Ear: Tympanic membrane, ear canal and external ear normal. There is no impacted cerumen. Left Ear: Tympanic membrane, ear canal and external ear normal. There is no impacted cerumen. Nose: Nose normal. No congestion. Mouth/Throat:      Mouth: Mucous membranes are moist.      Pharynx: Oropharynx is clear. No oropharyngeal exudate. Eyes:      Conjunctiva/sclera: Conjunctivae normal.      Pupils: Pupils are equal, round, and reactive to light. Cardiovascular:      Rate and Rhythm: Normal rate and regular rhythm. Heart sounds: No murmur heard. Pulmonary:      Effort: Pulmonary effort is normal. No respiratory distress. Breath sounds: Normal breath sounds. No wheezing. Musculoskeletal:      Cervical back: Normal range of motion and neck supple. Right lower leg: No edema. Left lower leg: No edema. Lymphadenopathy:      Cervical: No cervical adenopathy. Skin:     General: Skin is warm.    Neurological: General: No focal deficit present. Mental Status: He is alert and oriented to person, place, and time. Mental status is at baseline. Gait: Gait normal.   Psychiatric:         Mood and Affect: Mood normal.         Thought Content: Thought content normal.         Judgment: Judgment normal.         Vitals:    12/16/22 0810   BP: 136/84   Pulse: 76   Resp: 16   Temp: 97.9 °F (36.6 °C)   TempSrc: Temporal   SpO2: 96%   Weight: 193 lb 12.8 oz (87.9 kg)   Height: 6' 1\" (1.854 m)       Patient Care Team:  Sarah Brady MD as PCP - General (Internal Medicine Physician)  Ana Polk MD as PCP [facility]  Wilberto Yeboah MD (Orthopedic Surgery)  Hanna Villela MD (Ophthalmology)        Review of Systems   Constitutional:  Negative for chills and fever. HENT:  Negative for hearing loss. Respiratory:  Negative for cough and shortness of breath. Cardiovascular:  Negative for chest pain and palpitations. Gastrointestinal:  Negative for abdominal pain, blood in stool, constipation, diarrhea, heartburn, nausea and vomiting. Musculoskeletal:  Negative for joint pain and myalgias. Hip surgery this year, feeling good   Neurological:  Negative for tingling, focal weakness and headaches. Endo/Heme/Allergies:  Does not bruise/bleed easily. Psychiatric/Behavioral:  Negative for depression. The patient is not nervous/anxious and does not have insomnia. The following sections were reviewed & updated as appropriate: Problem List, Allergies, PMH, PSH, FH, and SH.     Patient Active Problem List   Diagnosis Code    Inguinal hernia K40.90    Nocturia R35.1    Mixed hyperlipidemia E78.2    MVP (mitral valve prolapse) I34.1    Hemorrhoids, unspecified hemorrhoid type K64.9    Allergic rhinitis, cause unspecified J30.9    Vitamin D deficiency E55.9    Elevated coronary artery calcium score R93.1    Advance directive discussed with patient Z71.89    History of retinal tear Z86.69 IFG (impaired fasting glucose) R73.01       Adhesive and Hay fever and allergy relief    Social History     Occupational History    Not on file   Tobacco Use    Smoking status: Never    Smokeless tobacco: Never   Vaping Use    Vaping Use: Never used   Substance and Sexual Activity    Alcohol use: Yes     Alcohol/week: 2.0 standard drinks     Types: 1 Glasses of wine, 1 Cans of beer per week     Comment: occasionally    Drug use: No    Sexual activity: Not Currently     Partners: Female     Birth control/protection: Abstinence        Current Outpatient Medications   Medication Instructions    atorvastatin (LIPITOR) 20 mg, Oral, DAILY    multivit-min/ferrous fumarate (MULTI VITAMIN PO) 1 Tablet, Oral, 7AM         Disclaimer:  Aspects of this note may have been generated using Dragon voice recognition software. Despite editing, there may be some syntax errors   We discussed the expected course, resolution and complications of the diagnosis(es) in detail. I have discussed any significant medication side effects and warnings with the patient when indicated. I advised them to contact the office if their condition worsens, changes or fails to improve as anticipated. Patient expressed understanding of the diagnosis and plan. An electronic signature was used to authenticate this note.   -- Carlita Hammond MD

## 2023-04-03 DIAGNOSIS — E78.2 MIXED HYPERLIPIDEMIA: ICD-10-CM

## 2023-04-03 DIAGNOSIS — R93.1 ELEVATED CORONARY ARTERY CALCIUM SCORE: ICD-10-CM

## 2023-04-03 RX ORDER — ATORVASTATIN CALCIUM 20 MG/1
20 TABLET, FILM COATED ORAL DAILY
Qty: 30 TABLET | Refills: 0 | Status: SHIPPED | OUTPATIENT
Start: 2023-04-03

## 2023-04-03 NOTE — TELEPHONE ENCOUNTER
Requested Prescriptions     Pending Prescriptions Disp Refills    atorvastatin (LIPITOR) 20 mg tablet 90 Tablet 3     Sig: Take 1 Tablet by mouth daily.      Vijaya Light [id number] - [facility], [address] [place] [address]  [phone]

## 2023-05-22 ENCOUNTER — TELEPHONE (OUTPATIENT)
Age: 73
End: 2023-05-22

## 2023-05-22 NOTE — TELEPHONE ENCOUNTER
Medication Refill Request    Dio Christoph is requesting a refill of the following medication(s):   Atorvastatin 20 mg. Helio Cortez: 30  Please send refill to:  [facility] [id number] - Austin Wallis, [address] [place] [address]

## 2023-05-23 DIAGNOSIS — E78.2 MIXED HYPERLIPIDEMIA: Primary | ICD-10-CM

## 2023-05-23 RX ORDER — ATORVASTATIN CALCIUM 20 MG/1
20 TABLET, FILM COATED ORAL DAILY
Qty: 90 TABLET | Refills: 0 | Status: SHIPPED | OUTPATIENT
Start: 2023-05-23

## 2023-06-20 ENCOUNTER — ANESTHESIA EVENT (OUTPATIENT)
Facility: HOSPITAL | Age: 73
End: 2023-06-20
Payer: MEDICARE

## 2023-06-20 ENCOUNTER — HOSPITAL ENCOUNTER (OUTPATIENT)
Facility: HOSPITAL | Age: 73
Setting detail: OUTPATIENT SURGERY
Discharge: HOME | End: 2023-06-20
Attending: INTERNAL MEDICINE | Admitting: INTERNAL MEDICINE
Payer: MEDICARE

## 2023-06-20 ENCOUNTER — ANESTHESIA (OUTPATIENT)
Facility: HOSPITAL | Age: 73
End: 2023-06-20
Payer: MEDICARE

## 2023-06-20 VITALS
HEART RATE: 77 BPM | HEIGHT: 74 IN | BODY MASS INDEX: 24.81 KG/M2 | OXYGEN SATURATION: 100 % | SYSTOLIC BLOOD PRESSURE: 121 MMHG | WEIGHT: 193.34 LBS | DIASTOLIC BLOOD PRESSURE: 77 MMHG | TEMPERATURE: 98 F | RESPIRATION RATE: 15 BRPM

## 2023-06-20 PROCEDURE — 2709999900 HC NON-CHARGEABLE SUPPLY: Performed by: INTERNAL MEDICINE

## 2023-06-20 PROCEDURE — 3600007502: Performed by: INTERNAL MEDICINE

## 2023-06-20 PROCEDURE — 3700000000 HC ANESTHESIA ATTENDED CARE: Performed by: INTERNAL MEDICINE

## 2023-06-20 PROCEDURE — 3600007512: Performed by: INTERNAL MEDICINE

## 2023-06-20 PROCEDURE — 2580000003 HC RX 258: Performed by: NURSE ANESTHETIST, CERTIFIED REGISTERED

## 2023-06-20 PROCEDURE — 7100000010 HC PHASE II RECOVERY - FIRST 15 MIN: Performed by: INTERNAL MEDICINE

## 2023-06-20 PROCEDURE — 3700000001 HC ADD 15 MINUTES (ANESTHESIA): Performed by: INTERNAL MEDICINE

## 2023-06-20 PROCEDURE — 6360000002 HC RX W HCPCS: Performed by: NURSE ANESTHETIST, CERTIFIED REGISTERED

## 2023-06-20 RX ORDER — PROPOFOL 10 MG/ML
INJECTION, EMULSION INTRAVENOUS PRN
Status: DISCONTINUED | OUTPATIENT
Start: 2023-06-20 | End: 2023-06-20 | Stop reason: SDUPTHER

## 2023-06-20 RX ORDER — 0.9 % SODIUM CHLORIDE 0.9 %
INTRAVENOUS SOLUTION INTRAVENOUS PRN
Status: DISCONTINUED | OUTPATIENT
Start: 2023-06-20 | End: 2023-06-20 | Stop reason: SDUPTHER

## 2023-06-20 RX ORDER — SODIUM CHLORIDE 0.9 % (FLUSH) 0.9 %
5-40 SYRINGE (ML) INJECTION PRN
Status: DISCONTINUED | OUTPATIENT
Start: 2023-06-20 | End: 2023-06-20 | Stop reason: HOSPADM

## 2023-06-20 RX ORDER — SODIUM CHLORIDE 0.9 % (FLUSH) 0.9 %
5-40 SYRINGE (ML) INJECTION EVERY 12 HOURS SCHEDULED
Status: DISCONTINUED | OUTPATIENT
Start: 2023-06-20 | End: 2023-06-20 | Stop reason: HOSPADM

## 2023-06-20 RX ORDER — SODIUM CHLORIDE 9 MG/ML
25 INJECTION, SOLUTION INTRAVENOUS PRN
Status: DISCONTINUED | OUTPATIENT
Start: 2023-06-20 | End: 2023-06-20 | Stop reason: HOSPADM

## 2023-06-20 RX ORDER — M-VIT,TX,IRON,MINS/CALC/FOLIC 27MG-0.4MG
1 TABLET ORAL DAILY
COMMUNITY

## 2023-06-20 RX ORDER — PROPOFOL 10 MG/ML
INJECTION, EMULSION INTRAVENOUS CONTINUOUS PRN
Status: DISCONTINUED | OUTPATIENT
Start: 2023-06-20 | End: 2023-06-20 | Stop reason: SDUPTHER

## 2023-06-20 RX ADMIN — PROPOFOL 100 MG: 10 INJECTION, EMULSION INTRAVENOUS at 14:07

## 2023-06-20 RX ADMIN — SODIUM CHLORIDE 300 ML: 900 INJECTION, SOLUTION INTRAVENOUS at 14:31

## 2023-06-20 RX ADMIN — PROPOFOL 120 MCG/KG/MIN: 10 INJECTION, EMULSION INTRAVENOUS at 14:07

## 2023-06-20 ASSESSMENT — PAIN - FUNCTIONAL ASSESSMENT
PAIN_FUNCTIONAL_ASSESSMENT: NONE - DENIES PAIN
PAIN_FUNCTIONAL_ASSESSMENT: 0-10

## 2023-06-20 NOTE — OP NOTE
normal on antegrade and retroflexed views. Multiple pull throughs were done through the cecum and ascending colon. Interventions:  None    Specimen Removed: None    Complications: None. EBL:  None. Impression:    Moderately severe sigmoid diverticulosis with fixed narrowing of the sigmoid (< 1cm in length - suspect this is due to scar tissue from prior hernia surgery). Medium sized hemorrhoids. Recommendations:  No further screening colonoscopies due to age. High fiber diet. Resume normal medication(s). Discharge Disposition:  Home in the company of a  when able to ambulate.     Sandee Shen MD  6/20/2023  2:34 PM

## 2023-06-20 NOTE — PERIOP NOTE
Nursing report given to Saida Bean RN. Patient tolerated procedure without problems. Abdomen soft and patient arousable and voices no complaints Report received from Jonathon Rodriguez CRNA, see anesthesia note. Patient transported to endoscopy recovery area. Scope precleaned by Kimberly Newman at bedside.

## 2023-06-20 NOTE — H&P
Marjorie Sumner M.D.  [phone]    History and Physical       NAME:  Nini Prado   :   1950   MRN:   [id number]       Consult Date: 2023 2:03 PM    History of Present Illness:    68 yo man here for colon cancer screening. Normal colonoscopy in . PMH:  Past Medical History:   Diagnosis Date    Allergic rhinitis, cause unspecified     Arrhythmia     heart murmur    Arthritis     Cancer ([address]     PRE-CANCER SPOTS ON SKIN    Cataract     BILATERAL WITH LENS IMPLANTS    Clavicle fracture     10 YO    Detached retina, bilateral     Hemorrhoids     High cholesterol     Mitral valve prolapse     Wrist fracture     LEFT WRIST, 10 OR 12 YO    Wrist fracture     RIGHT WRIST @ 9 YO       PSH:  Past Surgical History:   Procedure Laterality Date    CATARACT REMOVAL Bilateral     COLONOSCOPY      ,  , due 22    HERNIA REPAIR Bilateral 2011    [facility]    ORTHOPEDIC SURGERY Left 10/06/2021    NODULE ON WRIST, SURGEON DR. Ming Allen HEPPER    RETINAL DETACHMENT SURGERY Bilateral     WISDOM TOOTH EXTRACTION         Allergies: Allergies   Allergen Reactions    Adhesive Tape Rash     ecg adhesive       Home Medications:  Prior to Admission Medications   Prescriptions Last Dose Informant Patient Reported? Taking? Multiple Vitamins-Minerals (THERAPEUTIC MULTIVITAMIN-MINERALS) tablet 2023  Yes Yes   Sig: Take 1 tablet by mouth daily   atorvastatin (LIPITOR) 20 MG tablet 2023  No No   Sig: Take 1 tablet by mouth daily      Facility-Administered Medications: None       Hospital Medications:  No current facility-administered medications for this encounter. Social History:  Social History     Tobacco Use    Smoking status: Never    Smokeless tobacco: Never   Substance Use Topics    Alcohol use:  Yes     Alcohol/week: 2.0 standard drinks     Comment: occasionally       Family History:  Family History   Problem Relation Age of Onset    No

## 2023-06-20 NOTE — DISCHARGE INSTRUCTIONS
[facility] Nova Si, M.D.  [phone]            COLON DISCHARGE INSTRUCTIONS       2023    Jean-Pierre Ericksonr  :  1950  Jenn Medical Record Number:  [id number]      COLONOSCOPY FINDINGS:  Your colonoscopy showed: Moderately severe sigmoid diverticulosis with fixed narrowing of the sigmoid (< 1cm in length - suspect this is due to scar tissue from prior hernia surgery). Medium sized hemorrhoids. Erskin Cram DISCOMFORT:  Redness at IV site- apply warm compress to area; if redness or soreness persist- contact your physician  There may be a slight amount of blood passed from the rectum  Gaseous discomfort- walking, belching will help relieve any discomfort  You may not operate a vehicle for 12 hours  You may not engage in an occupation involving machinery or appliances for rest of today  You may not drink alcoholic beverages for at least 12 hours  Avoid making any critical decisions for at least 24 hour  DIET:   High fiber   - however -  remember your colon is empty and a heavy meal will produce gas. Avoid these foods:  vegetables, fried / greasy foods, carbonated drinks for today     ACTIVITY:  You may resume your normal daily activities it is recommended that you spend the remainder of the day resting -  avoid any strenuous activity. CALL M.DShannon ANY SIGN OF:   Increasing pain, nausea, vomiting  Abdominal distension (swelling)  New increased bleeding (oral or rectal)  Fever (chills)  Pain in chest area  Bloody discharge from nose or mouth   Shortness of breath    Follow-up Instructions:  Call Dr. Rohini España at [phone] if you have any questions or problems. Biopsy results will be available in about 14 days. If you have not heard about your results within 2-3 weeks, please call the office. Recommendations:  No further screening colonoscopies due to age. High fiber diet. Resume normal medication(s).

## 2023-06-20 NOTE — ANESTHESIA PRE PROCEDURE
Left 10/06/2021    NODULE ON WRIST, SURGEON DR. Joy Campbell HEPPER    RETINAL DETACHMENT SURGERY Bilateral 1990's    WISDOM TOOTH EXTRACTION         Social History:    Social History     Tobacco Use    Smoking status: Never    Smokeless tobacco: Never   Substance Use Topics    Alcohol use: Yes     Alcohol/week: 2.0 standard drinks     Comment: occasionally                                Counseling given: Not Answered      Vital Signs (Current):   Vitals:    06/20/23 1241   BP: (!) 145/72   Pulse: 73   Resp: 21   Temp: 98.1 °F (36.7 °C)   TempSrc: Oral   SpO2: 97%   Weight: 87.7 kg (193 lb 5.5 oz)   Height: 1.88 m (6' 2\")                                              BP Readings from Last 3 Encounters:   06/20/23 (!) 145/72   12/16/22 136/84   01/19/22 (!) 148/84       NPO Status: Time of last liquid consumption: 1030                        Time of last solid consumption: 1800                        Date of last liquid consumption: 06/20/23                        Date of last solid food consumption: 06/18/23    BMI:   Wt Readings from Last 3 Encounters:   06/20/23 87.7 kg (193 lb 5.5 oz)   12/16/22 87.9 kg (193 lb 12.8 oz)   03/31/22 86.2 kg (190 lb)     Body mass index is 24.82 kg/m².     CBC:   Lab Results   Component Value Date/Time    WBC 6.3 12/16/2022 08:46 AM    RBC 4.64 12/16/2022 08:46 AM    HGB 14.3 12/16/2022 08:46 AM    HCT 45.2 12/16/2022 08:46 AM    MCV 97.4 12/16/2022 08:46 AM    RDW 13.9 12/16/2022 08:46 AM     12/16/2022 08:46 AM       CMP:   Lab Results   Component Value Date/Time     12/16/2022 08:46 AM    K 4.3 12/16/2022 08:46 AM     12/16/2022 08:46 AM    CO2 28 12/16/2022 08:46 AM    BUN 19 12/16/2022 08:46 AM    CREATININE 1.09 12/16/2022 08:46 AM    GFRAA >60 02/10/2022 04:25 AM    AGRATIO 1.3 12/16/2022 08:46 AM    GLUCOSE 95 12/16/2022 08:46 AM    PROT 7.2 12/16/2022 08:46 AM    CALCIUM 8.4 12/16/2022 08:46 AM    BILITOT 0.7 12/16/2022 08:46 AM    ALKPHOS 69 12/16/2022 Principal Discharge DX:	Paresthesia

## 2023-06-20 NOTE — ANESTHESIA POSTPROCEDURE EVALUATION
Department of Anesthesiology  Postprocedure Note    Patient: Zulema Braxton  MRN: [id number]  YOB: 1950  Date of evaluation: 6/20/2023      Procedure Summary     Date: 06/20/23 Room / Location: [facility] ENDO 02 / [facility] ENDOSCOPY    Anesthesia Start: 1406 Anesthesia Stop: 0285    Procedure: COLONOSCOPY DIAGNOSTIC (Lower GI Region) Diagnosis:       Screen for colon cancer      (Screen for colon cancer [Z12.11])    Surgeons: Basim Bangura MD Responsible Provider: Astrid Falcon DO    Anesthesia Type: MAC ASA Status: 1          Anesthesia Type: No value filed. Silvana Phase I: Silvana Score: 10    Silvana Phase II: Silvana Score: 10      Anesthesia Post Evaluation    Patient location during evaluation: PACU  Patient participation: complete - patient participated  Level of consciousness: awake  Pain score: 0  Airway patency: patent  Nausea & Vomiting: no vomiting and no nausea  Complications: no  Cardiovascular status: hemodynamically stable  Respiratory status: acceptable  Hydration status: stable  Comments: Patient seen and examined. Ready for discharge from PACU.

## 2023-06-20 NOTE — PROGRESS NOTES
Endoscopy discharge instructions have been reviewed and given to patient. The patient verbalized understanding and acceptance of instructions. Dr. Scottie Anderson discussed with patient procedure findings and next steps.

## 2024-01-28 SDOH — ECONOMIC STABILITY: INCOME INSECURITY: HOW HARD IS IT FOR YOU TO PAY FOR THE VERY BASICS LIKE FOOD, HOUSING, MEDICAL CARE, AND HEATING?: NOT HARD AT ALL

## 2024-01-28 SDOH — ECONOMIC STABILITY: TRANSPORTATION INSECURITY
IN THE PAST 12 MONTHS, HAS LACK OF TRANSPORTATION KEPT YOU FROM MEETINGS, WORK, OR FROM GETTING THINGS NEEDED FOR DAILY LIVING?: NO

## 2024-01-28 SDOH — HEALTH STABILITY: PHYSICAL HEALTH: ON AVERAGE, HOW MANY DAYS PER WEEK DO YOU ENGAGE IN MODERATE TO STRENUOUS EXERCISE (LIKE A BRISK WALK)?: 5 DAYS

## 2024-01-28 SDOH — ECONOMIC STABILITY: FOOD INSECURITY: WITHIN THE PAST 12 MONTHS, THE FOOD YOU BOUGHT JUST DIDN'T LAST AND YOU DIDN'T HAVE MONEY TO GET MORE.: NEVER TRUE

## 2024-01-28 SDOH — ECONOMIC STABILITY: FOOD INSECURITY: WITHIN THE PAST 12 MONTHS, YOU WORRIED THAT YOUR FOOD WOULD RUN OUT BEFORE YOU GOT MONEY TO BUY MORE.: NEVER TRUE

## 2024-01-28 SDOH — ECONOMIC STABILITY: HOUSING INSECURITY
IN THE LAST 12 MONTHS, WAS THERE A TIME WHEN YOU DID NOT HAVE A STEADY PLACE TO SLEEP OR SLEPT IN A SHELTER (INCLUDING NOW)?: NO

## 2024-01-28 SDOH — HEALTH STABILITY: PHYSICAL HEALTH: ON AVERAGE, HOW MANY MINUTES DO YOU ENGAGE IN EXERCISE AT THIS LEVEL?: 20 MIN

## 2024-01-28 ASSESSMENT — LIFESTYLE VARIABLES
HOW MANY STANDARD DRINKS CONTAINING ALCOHOL DO YOU HAVE ON A TYPICAL DAY: 1
HOW OFTEN DO YOU HAVE A DRINK CONTAINING ALCOHOL: 2
HOW OFTEN DO YOU HAVE SIX OR MORE DRINKS ON ONE OCCASION: 1
HOW MANY STANDARD DRINKS CONTAINING ALCOHOL DO YOU HAVE ON A TYPICAL DAY: 1 OR 2
HOW OFTEN DO YOU HAVE A DRINK CONTAINING ALCOHOL: MONTHLY OR LESS

## 2024-01-28 ASSESSMENT — PATIENT HEALTH QUESTIONNAIRE - PHQ9
SUM OF ALL RESPONSES TO PHQ QUESTIONS 1-9: 0
SUM OF ALL RESPONSES TO PHQ9 QUESTIONS 1 & 2: 0
2. FEELING DOWN, DEPRESSED OR HOPELESS: 0
SUM OF ALL RESPONSES TO PHQ QUESTIONS 1-9: 0
1. LITTLE INTEREST OR PLEASURE IN DOING THINGS: 0

## 2024-01-29 ENCOUNTER — OFFICE VISIT (OUTPATIENT)
Age: 74
End: 2024-01-29
Payer: MEDICARE

## 2024-01-29 VITALS
RESPIRATION RATE: 18 BRPM | BODY MASS INDEX: 26.35 KG/M2 | HEART RATE: 79 BPM | TEMPERATURE: 97.5 F | HEIGHT: 73 IN | WEIGHT: 198.8 LBS | SYSTOLIC BLOOD PRESSURE: 138 MMHG | DIASTOLIC BLOOD PRESSURE: 82 MMHG | OXYGEN SATURATION: 99 %

## 2024-01-29 DIAGNOSIS — E78.2 MIXED HYPERLIPIDEMIA: ICD-10-CM

## 2024-01-29 DIAGNOSIS — Z12.5 SCREENING PSA (PROSTATE SPECIFIC ANTIGEN): ICD-10-CM

## 2024-01-29 DIAGNOSIS — R03.0 ELEVATED BLOOD PRESSURE READING IN OFFICE WITHOUT DIAGNOSIS OF HYPERTENSION: ICD-10-CM

## 2024-01-29 DIAGNOSIS — Z00.00 ENCOUNTER FOR SUBSEQUENT ANNUAL WELLNESS VISIT (AWV) IN MEDICARE PATIENT: Primary | ICD-10-CM

## 2024-01-29 DIAGNOSIS — R73.01 IFG (IMPAIRED FASTING GLUCOSE): ICD-10-CM

## 2024-01-29 DIAGNOSIS — R93.1 ELEVATED CORONARY ARTERY CALCIUM SCORE: ICD-10-CM

## 2024-01-29 DIAGNOSIS — E55.9 VITAMIN D DEFICIENCY: ICD-10-CM

## 2024-01-29 PROCEDURE — G0439 PPPS, SUBSEQ VISIT: HCPCS | Performed by: INTERNAL MEDICINE

## 2024-01-29 PROCEDURE — 99213 OFFICE O/P EST LOW 20 MIN: CPT | Performed by: INTERNAL MEDICINE

## 2024-01-29 PROCEDURE — G8427 DOCREV CUR MEDS BY ELIG CLIN: HCPCS | Performed by: INTERNAL MEDICINE

## 2024-01-29 PROCEDURE — G8484 FLU IMMUNIZE NO ADMIN: HCPCS | Performed by: INTERNAL MEDICINE

## 2024-01-29 PROCEDURE — 1123F ACP DISCUSS/DSCN MKR DOCD: CPT | Performed by: INTERNAL MEDICINE

## 2024-01-29 PROCEDURE — G8419 CALC BMI OUT NRM PARAM NOF/U: HCPCS | Performed by: INTERNAL MEDICINE

## 2024-01-29 PROCEDURE — 3017F COLORECTAL CA SCREEN DOC REV: CPT | Performed by: INTERNAL MEDICINE

## 2024-01-29 PROCEDURE — 1036F TOBACCO NON-USER: CPT | Performed by: INTERNAL MEDICINE

## 2024-01-29 RX ORDER — ATORVASTATIN CALCIUM 20 MG/1
20 TABLET, FILM COATED ORAL DAILY
Qty: 90 TABLET | Refills: 3 | Status: SHIPPED | OUTPATIENT
Start: 2024-01-29

## 2024-01-29 RX ORDER — ACETAMINOPHEN 160 MG
TABLET,DISINTEGRATING ORAL
COMMUNITY

## 2024-01-29 NOTE — PROGRESS NOTES
Medicare Annual Wellness Visit    Kei Segovia is here for Medicare AWV    Assessment & Plan   Encounter for subsequent annual wellness visit (AWV) in Medicare patient  IFG (impaired fasting glucose)  Assessment & Plan:  Mildly elevated in the past, continue annual monitoring and carb conscious diet  Orders:  -     Hemoglobin A1C; Future  Mixed hyperlipidemia  Assessment & Plan:  Off statins, ok to resume  Will get new baseline and then recheck in 2mo  Let me know if any side effects noted on resuming  Orders:  -     Lipid Panel; Future  -     Comprehensive Metabolic Panel; Future  -     CBC with Auto Differential; Future  -     atorvastatin (LIPITOR) 20 MG tablet; Take 1 tablet by mouth daily, Disp-90 tablet, R-3Normal  -     Lipid Panel; Future  -     Hepatic Function Panel; Future  Elevated coronary artery calcium score  Assessment & Plan:  60th percentile 2015, advised continued statin therapy  Vitamin D deficiency  -     Vitamin D 25 Hydroxy; Future  Elevated blood pressure reading in office without diagnosis of hypertension  Assessment & Plan:  Advised on home monitoring of BP and keep a log  Update me with readings via [employer] in 2-3 weeks   Screening PSA (prostate specific antigen)  -     PSA Screening; Future    Recommendations for Preventive Services Due: see orders and patient instructions/AVS.  Recommended screening schedule for the next 5-10 years is provided to the patient in written form: see Patient Instructions/AVS.     Return for annual medicare wellness exam, or sooner as needed.     Subjective   Patient also here for review of chronic conditions, with statuses as documented in Assessment and Plan      Patient's complete Health Risk Assessment and screening values have been reviewed and are found in Flowsheets. The following problems were reviewed today and where indicated follow up appointments were made and/or referrals ordered.    No Positive Risk Factors identified today.

## 2024-01-29 NOTE — ASSESSMENT & PLAN NOTE
Advised on home monitoring of BP and keep a log  Update me with readings via [employer] in 2-3 weeks

## 2024-01-29 NOTE — ASSESSMENT & PLAN NOTE
Off statins, ok to resume  Will get new baseline and then recheck in 2mo  Let me know if any side effects noted on resuming

## 2024-01-29 NOTE — PROGRESS NOTES
Chief Complaint   Patient presents with    Medicare AWV     Blood pressure (!) 156/90, pulse 79, temperature 97.5 °F (36.4 °C), temperature source Temporal, resp. rate 18, height 1.862 m (6' 1.3\"), weight 90.2 kg (198 lb 12.8 oz), SpO2 99 %.

## 2024-01-30 DIAGNOSIS — E55.9 VITAMIN D DEFICIENCY: ICD-10-CM

## 2024-01-30 DIAGNOSIS — R73.01 IFG (IMPAIRED FASTING GLUCOSE): ICD-10-CM

## 2024-01-30 DIAGNOSIS — Z12.5 SCREENING PSA (PROSTATE SPECIFIC ANTIGEN): ICD-10-CM

## 2024-01-30 DIAGNOSIS — E78.2 MIXED HYPERLIPIDEMIA: ICD-10-CM

## 2024-01-30 LAB
25(OH)D3 SERPL-MCNC: 18.9 NG/ML (ref 30–100)
ALBUMIN SERPL-MCNC: 4 G/DL (ref 3.5–5)
ALBUMIN/GLOB SERPL: 1.3 (ref 1.1–2.2)
ALP SERPL-CCNC: 68 U/L (ref 45–117)
ALT SERPL-CCNC: 35 U/L (ref 12–78)
ANION GAP SERPL CALC-SCNC: 3 MMOL/L (ref 5–15)
AST SERPL-CCNC: 26 U/L (ref 15–37)
BASOPHILS # BLD: 0 K/UL (ref 0–0.1)
BASOPHILS NFR BLD: 1 % (ref 0–1)
BILIRUB SERPL-MCNC: 0.6 MG/DL (ref 0.2–1)
BUN SERPL-MCNC: 25 MG/DL (ref 6–20)
BUN/CREAT SERPL: 21 (ref 12–20)
CALCIUM SERPL-MCNC: 9.5 MG/DL (ref 8.5–10.1)
CHLORIDE SERPL-SCNC: 105 MMOL/L (ref 97–108)
CHOLEST SERPL-MCNC: 222 MG/DL
CO2 SERPL-SCNC: 31 MMOL/L (ref 21–32)
CREAT SERPL-MCNC: 1.18 MG/DL (ref 0.7–1.3)
DIFFERENTIAL METHOD BLD: NORMAL
EOSINOPHIL # BLD: 0.2 K/UL (ref 0–0.4)
EOSINOPHIL NFR BLD: 3 % (ref 0–7)
ERYTHROCYTE [DISTWIDTH] IN BLOOD BY AUTOMATED COUNT: 13.8 % (ref 11.5–14.5)
EST. AVERAGE GLUCOSE BLD GHB EST-MCNC: 108 MG/DL
GLOBULIN SER CALC-MCNC: 3.2 G/DL (ref 2–4)
GLUCOSE SERPL-MCNC: 98 MG/DL (ref 65–100)
HBA1C MFR BLD: 5.4 % (ref 4–5.6)
HCT VFR BLD AUTO: 48.4 % (ref 36.6–50.3)
HDLC SERPL-MCNC: 69 MG/DL
HDLC SERPL: 3.2 (ref 0–5)
HGB BLD-MCNC: 15.4 G/DL (ref 12.1–17)
IMM GRANULOCYTES # BLD AUTO: 0 K/UL (ref 0–0.04)
IMM GRANULOCYTES NFR BLD AUTO: 0 % (ref 0–0.5)
LDLC SERPL CALC-MCNC: 138.8 MG/DL (ref 0–100)
LYMPHOCYTES # BLD: 2.6 K/UL (ref 0.8–3.5)
LYMPHOCYTES NFR BLD: 46 % (ref 12–49)
MCH RBC QN AUTO: 30.4 PG (ref 26–34)
MCHC RBC AUTO-ENTMCNC: 31.8 G/DL (ref 30–36.5)
MCV RBC AUTO: 95.5 FL (ref 80–99)
MONOCYTES # BLD: 0.5 K/UL (ref 0–1)
MONOCYTES NFR BLD: 8 % (ref 5–13)
NEUTS SEG # BLD: 2.3 K/UL (ref 1.8–8)
NEUTS SEG NFR BLD: 42 % (ref 32–75)
NRBC # BLD: 0 K/UL (ref 0–0.01)
NRBC BLD-RTO: 0 PER 100 WBC
PLATELET # BLD AUTO: 245 K/UL (ref 150–400)
PMV BLD AUTO: 10.7 FL (ref 8.9–12.9)
POTASSIUM SERPL-SCNC: 5.4 MMOL/L (ref 3.5–5.1)
PROT SERPL-MCNC: 7.2 G/DL (ref 6.4–8.2)
PSA SERPL-MCNC: 1.6 NG/ML (ref 0.01–4)
RBC # BLD AUTO: 5.07 M/UL (ref 4.1–5.7)
SODIUM SERPL-SCNC: 139 MMOL/L (ref 136–145)
TRIGL SERPL-MCNC: 71 MG/DL
VLDLC SERPL CALC-MCNC: 14.2 MG/DL
WBC # BLD AUTO: 5.6 K/UL (ref 4.1–11.1)

## 2024-01-31 DIAGNOSIS — E87.5 HYPERKALEMIA: Primary | ICD-10-CM

## 2024-06-25 DIAGNOSIS — E87.5 HYPERKALEMIA: ICD-10-CM

## 2024-06-25 DIAGNOSIS — E78.2 MIXED HYPERLIPIDEMIA: ICD-10-CM

## 2024-06-25 LAB
ALBUMIN SERPL-MCNC: 3.8 G/DL (ref 3.5–5)
ALBUMIN/GLOB SERPL: 1.2 (ref 1.1–2.2)
ALP SERPL-CCNC: 68 U/L (ref 45–117)
ALT SERPL-CCNC: 38 U/L (ref 12–78)
ANION GAP SERPL CALC-SCNC: 5 MMOL/L (ref 5–15)
AST SERPL-CCNC: 31 U/L (ref 15–37)
BILIRUB DIRECT SERPL-MCNC: 0.2 MG/DL (ref 0–0.2)
BILIRUB SERPL-MCNC: 0.7 MG/DL (ref 0.2–1)
BUN SERPL-MCNC: 26 MG/DL (ref 6–20)
BUN/CREAT SERPL: 24 (ref 12–20)
CALCIUM SERPL-MCNC: 9.1 MG/DL (ref 8.5–10.1)
CHLORIDE SERPL-SCNC: 106 MMOL/L (ref 97–108)
CHOLEST SERPL-MCNC: 151 MG/DL
CO2 SERPL-SCNC: 29 MMOL/L (ref 21–32)
COMMENT:: NORMAL
CREAT SERPL-MCNC: 1.1 MG/DL (ref 0.7–1.3)
GLOBULIN SER CALC-MCNC: 3.1 G/DL (ref 2–4)
GLUCOSE SERPL-MCNC: 99 MG/DL (ref 65–100)
HDLC SERPL-MCNC: 67 MG/DL
HDLC SERPL: 2.3 (ref 0–5)
LDLC SERPL CALC-MCNC: 73 MG/DL (ref 0–100)
POTASSIUM SERPL-SCNC: 4.4 MMOL/L (ref 3.5–5.1)
PROT SERPL-MCNC: 6.9 G/DL (ref 6.4–8.2)
SODIUM SERPL-SCNC: 140 MMOL/L (ref 136–145)
SPECIMEN HOLD: NORMAL
TRIGL SERPL-MCNC: 55 MG/DL
VLDLC SERPL CALC-MCNC: 11 MG/DL

## 2024-09-02 ENCOUNTER — HOSPITAL ENCOUNTER (EMERGENCY)
Facility: HOSPITAL | Age: 74
Discharge: HOME | End: 2024-09-02
Attending: EMERGENCY MEDICINE
Payer: MEDICARE

## 2024-09-02 ENCOUNTER — APPOINTMENT (OUTPATIENT)
Facility: HOSPITAL | Age: 74
End: 2024-09-02
Payer: MEDICARE

## 2024-09-02 VITALS
TEMPERATURE: 97.3 F | HEART RATE: 91 BPM | BODY MASS INDEX: 24.7 KG/M2 | RESPIRATION RATE: 18 BRPM | WEIGHT: 198.63 LBS | OXYGEN SATURATION: 98 % | HEIGHT: 75 IN | DIASTOLIC BLOOD PRESSURE: 84 MMHG | SYSTOLIC BLOOD PRESSURE: 133 MMHG

## 2024-09-02 DIAGNOSIS — S61.012A LACERATION OF LEFT THUMB WITHOUT FOREIGN BODY WITHOUT DAMAGE TO NAIL, INITIAL ENCOUNTER: Primary | ICD-10-CM

## 2024-09-02 PROCEDURE — 2500000003 HC RX 250 WO HCPCS: Performed by: EMERGENCY MEDICINE

## 2024-09-02 PROCEDURE — 12002 RPR S/N/AX/GEN/TRNK2.6-7.5CM: CPT

## 2024-09-02 PROCEDURE — 99283 EMERGENCY DEPT VISIT LOW MDM: CPT

## 2024-09-02 PROCEDURE — 73140 X-RAY EXAM OF FINGER(S): CPT

## 2024-09-02 PROCEDURE — 6370000000 HC RX 637 (ALT 250 FOR IP): Performed by: EMERGENCY MEDICINE

## 2024-09-02 RX ORDER — CEPHALEXIN 500 MG/1
500 CAPSULE ORAL 3 TIMES DAILY
Qty: 21 CAPSULE | Refills: 0 | Status: SHIPPED | OUTPATIENT
Start: 2024-09-02 | End: 2024-09-09

## 2024-09-02 RX ORDER — LIDOCAINE HYDROCHLORIDE 20 MG/ML
2 INJECTION, SOLUTION EPIDURAL; INFILTRATION; INTRACAUDAL; PERINEURAL
Status: COMPLETED | OUTPATIENT
Start: 2024-09-02 | End: 2024-09-02

## 2024-09-02 RX ORDER — CEPHALEXIN 500 MG/1
500 CAPSULE ORAL
Status: COMPLETED | OUTPATIENT
Start: 2024-09-02 | End: 2024-09-02

## 2024-09-02 RX ADMIN — LIDOCAINE HYDROCHLORIDE 2 ML: 20 INJECTION, SOLUTION EPIDURAL; INFILTRATION; INTRACAUDAL; PERINEURAL at 11:28

## 2024-09-02 RX ADMIN — CEPHALEXIN 500 MG: 500 CAPSULE ORAL at 11:06

## 2024-09-02 ASSESSMENT — ENCOUNTER SYMPTOMS
SHORTNESS OF BREATH: 0
ABDOMINAL PAIN: 0
COUGH: 0
BACK PAIN: 0

## 2024-09-02 ASSESSMENT — PAIN - FUNCTIONAL ASSESSMENT: PAIN_FUNCTIONAL_ASSESSMENT: NONE - DENIES PAIN

## 2024-09-02 NOTE — ED PROVIDER NOTES
[facility] EMERGENCY DEP  EMERGENCY DEPARTMENT ENCOUNTER      Pt Name: Kei Segovia  MRN: [id number]  Birthdate 1950  Date of evaluation: 9/2/2024  Provider: AYLEEN Blanco NP    CHIEF COMPLAINT       Chief Complaint   Patient presents with    Laceration         HISTORY OF PRESENT ILLNESS   (Location/Symptom, Timing/Onset, Context/Setting, Quality, Duration, Modifying Factors, Severity)  Note limiting factors.   HPI  Patient is a 74-year-old male with past medical history significant for hyperlipidemia, vitamin D deficiency, nocturia, mitral valve prolapse, inguinal hernia, detached retina, hypertension, high cholesterol, and arthritis who presents to the ED with an injury to his left thumb.  He states he was using a drill on what type and the drill slipped causing the injury.  Bleeding is controlled. There is no obvious bony deformity. Good neurovascular sensation. No apparent tendon or nerve injury.  Tetanus booster was within the last 10 years.  Patient states that he washed out the wound and applied pressure prior to arrival.  Old charts reviewed    Review of External Medical Records:     Nursing Notes were reviewed.    REVIEW OF SYSTEMS    (2-9 systems for level 4, 10 or more for level 5)     Review of Systems   Constitutional:  Negative for activity change, appetite change, fever and unexpected weight change.   Respiratory:  Negative for cough and shortness of breath.    Cardiovascular:  Negative for chest pain, palpitations and leg swelling.   Gastrointestinal:  Negative for abdominal pain.   Musculoskeletal:  Negative for back pain and neck pain.   Skin:  Positive for wound.   All other systems reviewed and are negative.      Except as noted above the remainder of the review of systems was reviewed and negative.       PAST MEDICAL HISTORY     Past Medical History:   Diagnosis Date    Allergic rhinitis, cause unspecified     Arrhythmia     heart murmur    Arthritis     Cancer (HCC)     PRE-CANCER

## 2024-09-02 NOTE — ED TRIAGE NOTES
Pt arrives ambulatory to triage pickett c/o L thumb laceration that happened roughly 30 minutes ago when pt was drilling into a pipe. Pt denies pain and is unsure of last tetanus

## 2025-01-30 ENCOUNTER — OFFICE VISIT (OUTPATIENT)
Age: 75
End: 2025-01-30
Payer: MEDICARE

## 2025-01-30 VITALS
HEIGHT: 73 IN | HEART RATE: 89 BPM | RESPIRATION RATE: 16 BRPM | SYSTOLIC BLOOD PRESSURE: 128 MMHG | BODY MASS INDEX: 26.29 KG/M2 | DIASTOLIC BLOOD PRESSURE: 77 MMHG | TEMPERATURE: 97.8 F | WEIGHT: 198.4 LBS | OXYGEN SATURATION: 97 %

## 2025-01-30 DIAGNOSIS — R93.1 ELEVATED CORONARY ARTERY CALCIUM SCORE: ICD-10-CM

## 2025-01-30 DIAGNOSIS — E78.2 MIXED HYPERLIPIDEMIA: ICD-10-CM

## 2025-01-30 DIAGNOSIS — Z12.5 SCREENING PSA (PROSTATE SPECIFIC ANTIGEN): ICD-10-CM

## 2025-01-30 DIAGNOSIS — E55.9 VITAMIN D DEFICIENCY: ICD-10-CM

## 2025-01-30 DIAGNOSIS — R73.01 IFG (IMPAIRED FASTING GLUCOSE): ICD-10-CM

## 2025-01-30 DIAGNOSIS — Z00.00 ENCOUNTER FOR SUBSEQUENT ANNUAL WELLNESS VISIT (AWV) IN MEDICARE PATIENT: Primary | ICD-10-CM

## 2025-01-30 PROCEDURE — 99213 OFFICE O/P EST LOW 20 MIN: CPT | Performed by: INTERNAL MEDICINE

## 2025-01-30 RX ORDER — ATORVASTATIN CALCIUM 20 MG/1
20 TABLET, FILM COATED ORAL DAILY
Qty: 90 TABLET | Refills: 3 | Status: SHIPPED | OUTPATIENT
Start: 2025-01-30

## 2025-01-30 SDOH — ECONOMIC STABILITY: INCOME INSECURITY: IN THE LAST 12 MONTHS, WAS THERE A TIME WHEN YOU WERE NOT ABLE TO PAY THE MORTGAGE OR RENT ON TIME?: NO

## 2025-01-30 SDOH — ECONOMIC STABILITY: FOOD INSECURITY: WITHIN THE PAST 12 MONTHS, YOU WORRIED THAT YOUR FOOD WOULD RUN OUT BEFORE YOU GOT MONEY TO BUY MORE.: NEVER TRUE

## 2025-01-30 SDOH — HEALTH STABILITY: PHYSICAL HEALTH
ON AVERAGE, HOW MANY DAYS PER WEEK DO YOU ENGAGE IN MODERATE TO STRENUOUS EXERCISE (LIKE A BRISK WALK)?: PATIENT DECLINED

## 2025-01-30 SDOH — ECONOMIC STABILITY: FOOD INSECURITY: WITHIN THE PAST 12 MONTHS, THE FOOD YOU BOUGHT JUST DIDN'T LAST AND YOU DIDN'T HAVE MONEY TO GET MORE.: NEVER TRUE

## 2025-01-30 SDOH — ECONOMIC STABILITY: TRANSPORTATION INSECURITY
IN THE PAST 12 MONTHS, HAS THE LACK OF TRANSPORTATION KEPT YOU FROM MEDICAL APPOINTMENTS OR FROM GETTING MEDICATIONS?: NO

## 2025-01-30 ASSESSMENT — LIFESTYLE VARIABLES
HOW OFTEN DO YOU HAVE A DRINK CONTAINING ALCOHOL: MONTHLY OR LESS
HOW MANY STANDARD DRINKS CONTAINING ALCOHOL DO YOU HAVE ON A TYPICAL DAY: 1 OR 2
HOW OFTEN DO YOU HAVE SIX OR MORE DRINKS ON ONE OCCASION: 1
HOW MANY STANDARD DRINKS CONTAINING ALCOHOL DO YOU HAVE ON A TYPICAL DAY: 1
HOW OFTEN DO YOU HAVE A DRINK CONTAINING ALCOHOL: 2

## 2025-01-30 ASSESSMENT — PATIENT HEALTH QUESTIONNAIRE - PHQ9
SUM OF ALL RESPONSES TO PHQ9 QUESTIONS 1 & 2: 0
SUM OF ALL RESPONSES TO PHQ QUESTIONS 1-9: 0
2. FEELING DOWN, DEPRESSED OR HOPELESS: NOT AT ALL
SUM OF ALL RESPONSES TO PHQ QUESTIONS 1-9: 0
1. LITTLE INTEREST OR PLEASURE IN DOING THINGS: NOT AT ALL

## 2025-01-30 NOTE — PROGRESS NOTES
Medicare Annual Wellness Visit    Kei Segovia is here for Medicare AWV (Patient here today for medicare wellness visit. [facility] )    Assessment & Plan   Encounter for subsequent annual wellness visit (AWV) in Medicare patient  Mixed hyperlipidemia  Assessment & Plan:  Lab Results   Component Value Date    LDL 73 06/25/2024     Tolerating statin therapy, plan to continue current regimen pending lab results  Recheck labs to assess for response to medication, whether LDL is at target, and monitor for side effects    Orders:  -     Lipid Panel; Future  -     TSH; Future  -     Comprehensive Metabolic Panel; Future  -     CBC with Auto Differential; Future  -     atorvastatin (LIPITOR) 20 MG tablet; Take 1 tablet by mouth daily, Disp-90 tablet, R-3Normal  Elevated coronary artery calcium score  -     Lipid Panel; Future  IFG (impaired fasting glucose)  Assessment & Plan:  Mildly elevated in the past, continue annual monitoring and carb conscious diet  Orders:  -     Hemoglobin A1C; Future  Vitamin D deficiency  -     Vitamin D 25 Hydroxy; Future  Screening PSA (prostate specific antigen)  -     PSA Screening; Future       Return for annual medicare wellness exam, or sooner as needed.     Subjective   Patient also here for review of chronic conditions, with statuses as documented in Assessment and Plan      Patient's complete Health Risk Assessment and screening values have been reviewed and are found in Flowsheets. The following problems were reviewed today and where indicated follow up appointments were made and/or referrals ordered.    No Positive Risk Factors identified today.                                    Objective   Vitals:    01/30/25 1517   BP: 128/77   Pulse: 89   Resp: 16   Temp: 97.8 °F (36.6 °C)   TempSrc: Temporal   SpO2: 97%   Weight: 90 kg (198 lb 6.4 oz)   Height: 1.863 m (6' 1.35\")      Body mass index is 25.93 kg/m².        Physical Exam  Constitutional:       Appearance: Normal

## 2025-01-30 NOTE — ASSESSMENT & PLAN NOTE
Lab Results   Component Value Date    LDL 73 06/25/2024     Tolerating statin therapy, plan to continue current regimen pending lab results  Recheck labs to assess for response to medication, whether LDL is at target, and monitor for side effects

## 2025-02-05 DIAGNOSIS — Z12.5 SCREENING PSA (PROSTATE SPECIFIC ANTIGEN): ICD-10-CM

## 2025-02-05 DIAGNOSIS — R73.01 IFG (IMPAIRED FASTING GLUCOSE): ICD-10-CM

## 2025-02-05 DIAGNOSIS — E55.9 VITAMIN D DEFICIENCY: ICD-10-CM

## 2025-02-05 DIAGNOSIS — E78.2 MIXED HYPERLIPIDEMIA: ICD-10-CM

## 2025-02-05 DIAGNOSIS — R93.1 ELEVATED CORONARY ARTERY CALCIUM SCORE: ICD-10-CM

## 2025-02-05 LAB
25(OH)D3 SERPL-MCNC: 27.4 NG/ML (ref 30–100)
ALBUMIN SERPL-MCNC: 4 G/DL (ref 3.5–5)
ALBUMIN/GLOB SERPL: 1.3 (ref 1.1–2.2)
ALP SERPL-CCNC: 68 U/L (ref 45–117)
ALT SERPL-CCNC: 38 U/L (ref 12–78)
ANION GAP SERPL CALC-SCNC: 5 MMOL/L (ref 2–12)
AST SERPL-CCNC: 30 U/L (ref 15–37)
BASOPHILS # BLD: 0.04 K/UL (ref 0–0.1)
BASOPHILS NFR BLD: 0.7 % (ref 0–1)
BILIRUB SERPL-MCNC: 0.6 MG/DL (ref 0.2–1)
BUN SERPL-MCNC: 19 MG/DL (ref 6–20)
BUN/CREAT SERPL: 18 (ref 12–20)
CALCIUM SERPL-MCNC: 9.1 MG/DL (ref 8.5–10.1)
CHLORIDE SERPL-SCNC: 104 MMOL/L (ref 97–108)
CHOLEST SERPL-MCNC: 165 MG/DL
CO2 SERPL-SCNC: 29 MMOL/L (ref 21–32)
CREAT SERPL-MCNC: 1.08 MG/DL (ref 0.7–1.3)
DIFFERENTIAL METHOD BLD: NORMAL
EOSINOPHIL # BLD: 0.14 K/UL (ref 0–0.4)
EOSINOPHIL NFR BLD: 2.4 % (ref 0–7)
ERYTHROCYTE [DISTWIDTH] IN BLOOD BY AUTOMATED COUNT: 13.5 % (ref 11.5–14.5)
EST. AVERAGE GLUCOSE BLD GHB EST-MCNC: 120 MG/DL
GLOBULIN SER CALC-MCNC: 3 G/DL (ref 2–4)
GLUCOSE SERPL-MCNC: 93 MG/DL (ref 65–100)
HBA1C MFR BLD: 5.8 % (ref 4–5.6)
HCT VFR BLD AUTO: 45.6 % (ref 36.6–50.3)
HDLC SERPL-MCNC: 73 MG/DL
HDLC SERPL: 2.3 (ref 0–5)
HGB BLD-MCNC: 14.6 G/DL (ref 12.1–17)
IMM GRANULOCYTES # BLD AUTO: 0.02 K/UL (ref 0–0.04)
IMM GRANULOCYTES NFR BLD AUTO: 0.3 % (ref 0–0.5)
LDLC SERPL CALC-MCNC: 78.4 MG/DL (ref 0–100)
LYMPHOCYTES # BLD: 2.19 K/UL (ref 0.8–3.5)
LYMPHOCYTES NFR BLD: 37.2 % (ref 12–49)
MCH RBC QN AUTO: 29.9 PG (ref 26–34)
MCHC RBC AUTO-ENTMCNC: 32 G/DL (ref 30–36.5)
MCV RBC AUTO: 93.4 FL (ref 80–99)
MONOCYTES # BLD: 0.45 K/UL (ref 0–1)
MONOCYTES NFR BLD: 7.6 % (ref 5–13)
NEUTS SEG # BLD: 3.05 K/UL (ref 1.8–8)
NEUTS SEG NFR BLD: 51.8 % (ref 32–75)
NRBC # BLD: 0 K/UL (ref 0–0.01)
NRBC BLD-RTO: 0 PER 100 WBC
PLATELET # BLD AUTO: 224 K/UL (ref 150–400)
PMV BLD AUTO: 10.8 FL (ref 8.9–12.9)
POTASSIUM SERPL-SCNC: 4.4 MMOL/L (ref 3.5–5.1)
PROT SERPL-MCNC: 7 G/DL (ref 6.4–8.2)
PSA SERPL-MCNC: 1.6 NG/ML (ref 0.01–4)
RBC # BLD AUTO: 4.88 M/UL (ref 4.1–5.7)
SODIUM SERPL-SCNC: 138 MMOL/L (ref 136–145)
TRIGL SERPL-MCNC: 68 MG/DL
TSH SERPL DL<=0.05 MIU/L-ACNC: 1.36 UIU/ML (ref 0.36–3.74)
VLDLC SERPL CALC-MCNC: 13.6 MG/DL
WBC # BLD AUTO: 5.9 K/UL (ref 4.1–11.1)

## 2025-02-24 DIAGNOSIS — E78.2 MIXED HYPERLIPIDEMIA: ICD-10-CM

## 2025-02-25 RX ORDER — ATORVASTATIN CALCIUM 20 MG/1
20 TABLET, FILM COATED ORAL DAILY
Qty: 90 TABLET | Refills: 3 | OUTPATIENT
Start: 2025-02-25

## (undated) DEVICE — DRAPE,U/ SHT,SPLIT,PLAS,STERIL: Brand: MEDLINE

## (undated) DEVICE — MARKER,SKIN,WI/RULER AND LABELS: Brand: MEDLINE

## (undated) DEVICE — BIPOLAR FORCEPS CORD: Brand: VALLEYLAB

## (undated) DEVICE — BIT DRL L5IN DIA2MM STD ST S STL TWST BUSA

## (undated) DEVICE — CUFF TRNQT DGT MED ELAS ORN --

## (undated) DEVICE — DECANTER BAG 9": Brand: MEDLINE INDUSTRIES, INC.

## (undated) DEVICE — GLOVE SURG SZ 65 L12IN FNGR THK94MIL STD WHT LTX FREE

## (undated) DEVICE — T4 HOOD

## (undated) DEVICE — SUTURE ETHBND EXCEL SZ 2 L30IN NONABSORBABLE GRN L40MM V-37 MX69G

## (undated) DEVICE — NEEDLE HYPO 25GA L1.5IN BVL ORIENTED ECLIPSE

## (undated) DEVICE — DRESSING HYDROCOLLOID BORDER 35X10 IN ALUM PRIMASEAL

## (undated) DEVICE — TOTAL TRAY, 16FR 10ML SIL FOLEY, URN: Brand: MEDLINE

## (undated) DEVICE — SOLUTION IRRIG 3000ML 0.9% SOD CHL USP UROMATIC PLAS CONT

## (undated) DEVICE — DERMABOND SKIN ADH 0.7ML -- DERMABOND ADVANCED 12/BX

## (undated) DEVICE — CUFF BLD PRSS AD SZ 11 FIT 25-34CM LNG SFT 1 TB W/ M BAYNT

## (undated) DEVICE — SPONGE GZ W4XL4IN COT 12 PLY TYP VII WVN C FLD DSGN

## (undated) DEVICE — SOLUTION SURG PREP 26 CC PURPREP

## (undated) DEVICE — MINOR BASIN -SMH: Brand: MEDLINE INDUSTRIES, INC.

## (undated) DEVICE — COVER,MAYO STAND,STERILE: Brand: MEDLINE

## (undated) DEVICE — SUTURE VCRL SZ 1 L36IN ABSRB UD L36MM CT-1 1/2 CIR J947H

## (undated) DEVICE — HEWSON SUTURE RETRIEVER: Brand: HEWSON SUTURE RETRIEVER

## (undated) DEVICE — GLOVE ORANGE PI 8 1/2   MSG9085

## (undated) DEVICE — HANDLE LT SNAP ON ULT DURABLE LENS FOR TRUMPF ALC DISPOSABLE

## (undated) DEVICE — SUTURE VCRL SZ 0 L36IN ABSRB VLT L40MM CT 1/2 CIR J358H

## (undated) DEVICE — CONTAINER,SPECIMEN,3OZ,OR STRL: Brand: MEDLINE

## (undated) DEVICE — TIP SUCT CRV REG REDI

## (undated) DEVICE — SCRUB DRY SURG EZ SCRUB BRUSH PREOPERATIVE GRN

## (undated) DEVICE — Z DISCONTINUED GLOVE SURG SZ 7.5 L12IN FNGR THK13MIL WHT ISOLEX

## (undated) DEVICE — SUTURE MCRYL SZ 3-0 L27IN ABSRB UD L24MM PS-1 3/8 CIR PRIM Y936H

## (undated) DEVICE — BANDAGE,ELASTIC,ESMARK,STERILE,4"X9',LF: Brand: MEDLINE

## (undated) DEVICE — GOWN,PREVENTION PLUS,XLN/2XL,ST,22/CS: Brand: MEDLINE

## (undated) DEVICE — ELECTRODE BLDE L4IN NONINSULATED EDGE

## (undated) DEVICE — PREP SKN CHLRAPRP APL 26ML STR --

## (undated) DEVICE — YANKAUER,OPEN TIP,W/O VENT,STERILE: Brand: MEDLINE INDUSTRIES, INC.

## (undated) DEVICE — SOLUTION IV 50ML 0.9% SOD CHL

## (undated) DEVICE — DRAPE,REIN 53X77,STERILE: Brand: MEDLINE

## (undated) DEVICE — HYPODERMIC SAFETY NEEDLE: Brand: MAGELLAN

## (undated) DEVICE — SUTURE STRATAFIX SPRL SZ 1 L14IN ABSRB VLT L48CM CTX 1/2 SXPD2B405

## (undated) DEVICE — 3M™ STERI-DRAPE™ 2 INCISE DRAPE 2050: Brand: STERI-DRAPE™

## (undated) DEVICE — SOLUTION IRRIG 1000ML STRL H2O USP PLAS POUR BTL

## (undated) DEVICE — SYRINGE,EAR/ULCER, 2 OZ, STERILE: Brand: MEDLINE

## (undated) DEVICE — SUTURE ETHLN SZ 4-0 L18IN NONABSORBABLE BLK L19MM PS-2 3/8 1667H

## (undated) DEVICE — PATIENT PROTECTIVE PAD FOR IMP UNIVERSAL LATERAL HIP POSITIONER (ULP) (6/CASE): Brand: PATIENT PROTECTIVE PAD

## (undated) DEVICE — PAD BD MATTRESS 73X32 IN STD CONVOLUTED FOAM LTX FREE

## (undated) DEVICE — DRESSING,GAUZE,XEROFORM,CURAD,1"X8",ST: Brand: CURAD

## (undated) DEVICE — SOLUTION IRRIG 1000ML 0.9% SOD CHL USP POUR PLAS BTL

## (undated) DEVICE — GLOVE SURG SZ 65 L12IN FNGR THK79MIL GRN LTX FREE

## (undated) DEVICE — GLOVE SURG SZ 85 L12IN FNGR ORTHO 126MIL CRM LTX FREE

## (undated) DEVICE — DRAPE,HAND,STERILE: Brand: MEDLINE

## (undated) DEVICE — SYR 20ML LL STRL LF --

## (undated) DEVICE — SYR 10ML LUER LOK 1/5ML GRAD --

## (undated) DEVICE — PADDING CST 4IN STERILE --

## (undated) DEVICE — ANTERIOR TOTAL HIP-SMH: Brand: MEDLINE INDUSTRIES, INC.

## (undated) DEVICE — BLADE SAW W098XL354IN THK0047IN CUT THK0047IN SAG

## (undated) DEVICE — SUTURE VCRL SZ 2-0 L36IN ABSRB UD L40MM CT 1/2 CIR J957H

## (undated) DEVICE — SENSOR PLSE OXMTR AD CBL L3FT ADH TRANSMISSIVE

## (undated) DEVICE — ALCOHOL RUBBING ISO 16OZ 70%